# Patient Record
Sex: FEMALE | Race: BLACK OR AFRICAN AMERICAN | NOT HISPANIC OR LATINO | URBAN - METROPOLITAN AREA
[De-identification: names, ages, dates, MRNs, and addresses within clinical notes are randomized per-mention and may not be internally consistent; named-entity substitution may affect disease eponyms.]

---

## 2017-10-09 ENCOUNTER — INPATIENT (INPATIENT)
Facility: HOSPITAL | Age: 82
LOS: 2 days | Discharge: HOME | End: 2017-10-12
Attending: INTERNAL MEDICINE | Admitting: INTERNAL MEDICINE
Payer: MEDICARE

## 2017-10-09 VITALS
DIASTOLIC BLOOD PRESSURE: 94 MMHG | HEART RATE: 99 BPM | RESPIRATION RATE: 18 BRPM | WEIGHT: 171.08 LBS | TEMPERATURE: 98 F | SYSTOLIC BLOOD PRESSURE: 151 MMHG | OXYGEN SATURATION: 100 % | HEIGHT: 66 IN

## 2017-10-09 DIAGNOSIS — I10 ESSENTIAL (PRIMARY) HYPERTENSION: ICD-10-CM

## 2017-10-09 DIAGNOSIS — I82.431 ACUTE EMBOLISM AND THROMBOSIS OF RIGHT POPLITEAL VEIN: ICD-10-CM

## 2017-10-09 LAB
ALBUMIN SERPL ELPH-MCNC: 3.4 G/DL — SIGNIFICANT CHANGE UP (ref 3.3–5)
ALP SERPL-CCNC: 69 U/L — SIGNIFICANT CHANGE UP (ref 40–120)
ALT FLD-CCNC: 16 U/L — SIGNIFICANT CHANGE UP (ref 12–78)
ANION GAP SERPL CALC-SCNC: 8 MMOL/L — SIGNIFICANT CHANGE UP (ref 5–17)
APTT BLD: 28.4 SEC — SIGNIFICANT CHANGE UP (ref 27.5–37.4)
AST SERPL-CCNC: 21 U/L — SIGNIFICANT CHANGE UP (ref 15–37)
BASOPHILS # BLD AUTO: 0.1 K/UL — SIGNIFICANT CHANGE UP (ref 0–0.2)
BASOPHILS NFR BLD AUTO: 2.2 % — HIGH (ref 0–2)
BILIRUB SERPL-MCNC: 0.3 MG/DL — SIGNIFICANT CHANGE UP (ref 0.2–1.2)
BUN SERPL-MCNC: 12 MG/DL — SIGNIFICANT CHANGE UP (ref 7–23)
CALCIUM SERPL-MCNC: 9.3 MG/DL — SIGNIFICANT CHANGE UP (ref 8.5–10.1)
CHLORIDE SERPL-SCNC: 108 MMOL/L — SIGNIFICANT CHANGE UP (ref 96–108)
CO2 SERPL-SCNC: 25 MMOL/L — SIGNIFICANT CHANGE UP (ref 22–31)
CREAT SERPL-MCNC: 0.91 MG/DL — SIGNIFICANT CHANGE UP (ref 0.5–1.3)
D DIMER BLD IA.RAPID-MCNC: 793 NG/ML DDU — HIGH
EOSINOPHIL # BLD AUTO: 0.1 K/UL — SIGNIFICANT CHANGE UP (ref 0–0.5)
EOSINOPHIL NFR BLD AUTO: 1.6 % — SIGNIFICANT CHANGE UP (ref 0–6)
GLUCOSE SERPL-MCNC: 70 MG/DL — SIGNIFICANT CHANGE UP (ref 70–99)
HCT VFR BLD CALC: 39.3 % — SIGNIFICANT CHANGE UP (ref 34.5–45)
HGB BLD-MCNC: 12.6 G/DL — SIGNIFICANT CHANGE UP (ref 11.5–15.5)
INR BLD: 1.07 RATIO — SIGNIFICANT CHANGE UP (ref 0.88–1.16)
LYMPHOCYTES # BLD AUTO: 2.5 K/UL — SIGNIFICANT CHANGE UP (ref 1–3.3)
LYMPHOCYTES # BLD AUTO: 39 % — SIGNIFICANT CHANGE UP (ref 13–44)
MCHC RBC-ENTMCNC: 27.9 PG — SIGNIFICANT CHANGE UP (ref 27–34)
MCHC RBC-ENTMCNC: 32.1 GM/DL — SIGNIFICANT CHANGE UP (ref 32–36)
MCV RBC AUTO: 87 FL — SIGNIFICANT CHANGE UP (ref 80–100)
MONOCYTES # BLD AUTO: 0.7 K/UL — SIGNIFICANT CHANGE UP (ref 0–0.9)
MONOCYTES NFR BLD AUTO: 10.5 % — SIGNIFICANT CHANGE UP (ref 2–14)
NEUTROPHILS # BLD AUTO: 2.9 K/UL — SIGNIFICANT CHANGE UP (ref 1.8–7.4)
NEUTROPHILS NFR BLD AUTO: 46.7 % — SIGNIFICANT CHANGE UP (ref 43–77)
PLATELET # BLD AUTO: 211 K/UL — SIGNIFICANT CHANGE UP (ref 150–400)
POTASSIUM SERPL-MCNC: 4.3 MMOL/L — SIGNIFICANT CHANGE UP (ref 3.5–5.3)
POTASSIUM SERPL-SCNC: 4.3 MMOL/L — SIGNIFICANT CHANGE UP (ref 3.5–5.3)
PROT SERPL-MCNC: 8.3 GM/DL — SIGNIFICANT CHANGE UP (ref 6–8.3)
PROTHROM AB SERPL-ACNC: 11.7 SEC — SIGNIFICANT CHANGE UP (ref 9.8–12.7)
RBC # BLD: 4.52 M/UL — SIGNIFICANT CHANGE UP (ref 3.8–5.2)
RBC # FLD: 14.2 % — SIGNIFICANT CHANGE UP (ref 11–15)
SODIUM SERPL-SCNC: 141 MMOL/L — SIGNIFICANT CHANGE UP (ref 135–145)
WBC # BLD: 6.3 K/UL — SIGNIFICANT CHANGE UP (ref 3.8–10.5)
WBC # FLD AUTO: 6.3 K/UL — SIGNIFICANT CHANGE UP (ref 3.8–10.5)

## 2017-10-09 PROCEDURE — 93971 EXTREMITY STUDY: CPT | Mod: 26,RT

## 2017-10-09 PROCEDURE — 99285 EMERGENCY DEPT VISIT HI MDM: CPT

## 2017-10-09 RX ORDER — HEPARIN SODIUM 5000 [USP'U]/ML
3000 INJECTION INTRAVENOUS; SUBCUTANEOUS EVERY 6 HOURS
Qty: 0 | Refills: 0 | Status: DISCONTINUED | OUTPATIENT
Start: 2017-10-09 | End: 2017-10-10

## 2017-10-09 RX ORDER — HEPARIN SODIUM 5000 [USP'U]/ML
INJECTION INTRAVENOUS; SUBCUTANEOUS
Qty: 25000 | Refills: 0 | Status: DISCONTINUED | OUTPATIENT
Start: 2017-10-09 | End: 2017-10-10

## 2017-10-09 RX ORDER — HEPARIN SODIUM 5000 [USP'U]/ML
6500 INJECTION INTRAVENOUS; SUBCUTANEOUS EVERY 6 HOURS
Qty: 0 | Refills: 0 | Status: DISCONTINUED | OUTPATIENT
Start: 2017-10-09 | End: 2017-10-10

## 2017-10-09 RX ORDER — DULOXETINE HYDROCHLORIDE 30 MG/1
30 CAPSULE, DELAYED RELEASE ORAL DAILY
Qty: 0 | Refills: 0 | Status: DISCONTINUED | OUTPATIENT
Start: 2017-10-09 | End: 2017-10-12

## 2017-10-09 RX ORDER — HEPARIN SODIUM 5000 [USP'U]/ML
6500 INJECTION INTRAVENOUS; SUBCUTANEOUS ONCE
Qty: 0 | Refills: 0 | Status: COMPLETED | OUTPATIENT
Start: 2017-10-09 | End: 2017-10-09

## 2017-10-09 RX ADMIN — HEPARIN SODIUM 1400 UNIT(S)/HR: 5000 INJECTION INTRAVENOUS; SUBCUTANEOUS at 20:03

## 2017-10-09 RX ADMIN — HEPARIN SODIUM 6500 UNIT(S): 5000 INJECTION INTRAVENOUS; SUBCUTANEOUS at 20:02

## 2017-10-09 NOTE — ED ADULT NURSE NOTE - CAS EDN INTEG ASSESS
Left message for patient to return our phone call  
Phone call received from client stating she will be flying for [place] on Friday 1-27-17. States she gets motion sickness and wondering if she can take dramamine or is there something else she could take?    Last MD visit 1-11-17 for OB check    Call back to advise  
Pt notified of results. Verbalized understanding    
She can take dramamine.   
WDL

## 2017-10-09 NOTE — H&P ADULT - HISTORY OF PRESENT ILLNESS
The patient is a 85y Female complaining of pain, lower leg noted to have                                                                                                                 The patient is a 85y Female complaining of pain, lower leg noted to have DVT The patient is a 85y Female complaining of pain, lower leg noted to have DVT.                                                                                                                The patient is a 85y Female complaining of pain, lower leg noted to have DVT

## 2017-10-09 NOTE — ED ADULT TRIAGE NOTE - CHIEF COMPLAINT QUOTE
Swelling, warmth and pain to right calf since Friday, unable to get authorization from insurance company for out patient sonogram and pain is becoming unbearable with difficulty walking. Patient denies chest pains or sob.

## 2017-10-09 NOTE — ED PROVIDER NOTE - OBJECTIVE STATEMENT
85 year old femal 85 year old female with h/o HTN, osteoporosis, neuropathy and fibromyalgia presents today c/o right leg swelling and tenderness since Wednesday, pt is s/p right hip replacement on June 6, 2017 (-) chest pain (-) sob (-) palpitations (-) dizzy or lightheaded +tenderness with weight bearing rated 8/10

## 2017-10-10 LAB
ANION GAP SERPL CALC-SCNC: 9 MMOL/L — SIGNIFICANT CHANGE UP (ref 5–17)
APTT BLD: 106.2 SEC — HIGH (ref 27.5–37.4)
APTT BLD: 120.9 SEC — CRITICAL HIGH (ref 27.5–37.4)
APTT BLD: 43.1 SEC — HIGH (ref 27.5–37.4)
BUN SERPL-MCNC: 11 MG/DL — SIGNIFICANT CHANGE UP (ref 7–23)
CALCIUM SERPL-MCNC: 9.3 MG/DL — SIGNIFICANT CHANGE UP (ref 8.5–10.1)
CHLORIDE SERPL-SCNC: 106 MMOL/L — SIGNIFICANT CHANGE UP (ref 96–108)
CO2 SERPL-SCNC: 27 MMOL/L — SIGNIFICANT CHANGE UP (ref 22–31)
CREAT SERPL-MCNC: 0.87 MG/DL — SIGNIFICANT CHANGE UP (ref 0.5–1.3)
GLUCOSE SERPL-MCNC: 79 MG/DL — SIGNIFICANT CHANGE UP (ref 70–99)
HCT VFR BLD CALC: 38.6 % — SIGNIFICANT CHANGE UP (ref 34.5–45)
HCT VFR BLD CALC: 38.9 % — SIGNIFICANT CHANGE UP (ref 34.5–45)
HCT VFR BLD CALC: 39.7 % — SIGNIFICANT CHANGE UP (ref 34.5–45)
HGB BLD-MCNC: 12.4 G/DL — SIGNIFICANT CHANGE UP (ref 11.5–15.5)
HGB BLD-MCNC: 12.6 G/DL — SIGNIFICANT CHANGE UP (ref 11.5–15.5)
HGB BLD-MCNC: 12.9 G/DL — SIGNIFICANT CHANGE UP (ref 11.5–15.5)
MCHC RBC-ENTMCNC: 27.9 PG — SIGNIFICANT CHANGE UP (ref 27–34)
MCHC RBC-ENTMCNC: 28 PG — SIGNIFICANT CHANGE UP (ref 27–34)
MCHC RBC-ENTMCNC: 28.2 PG — SIGNIFICANT CHANGE UP (ref 27–34)
MCHC RBC-ENTMCNC: 32.2 GM/DL — SIGNIFICANT CHANGE UP (ref 32–36)
MCHC RBC-ENTMCNC: 32.4 GM/DL — SIGNIFICANT CHANGE UP (ref 32–36)
MCHC RBC-ENTMCNC: 32.5 GM/DL — SIGNIFICANT CHANGE UP (ref 32–36)
MCV RBC AUTO: 86.1 FL — SIGNIFICANT CHANGE UP (ref 80–100)
MCV RBC AUTO: 86.8 FL — SIGNIFICANT CHANGE UP (ref 80–100)
MCV RBC AUTO: 87 FL — SIGNIFICANT CHANGE UP (ref 80–100)
PLATELET # BLD AUTO: 256 K/UL — SIGNIFICANT CHANGE UP (ref 150–400)
PLATELET # BLD AUTO: 262 K/UL — SIGNIFICANT CHANGE UP (ref 150–400)
PLATELET # BLD AUTO: 264 K/UL — SIGNIFICANT CHANGE UP (ref 150–400)
POTASSIUM SERPL-MCNC: 3.8 MMOL/L — SIGNIFICANT CHANGE UP (ref 3.5–5.3)
POTASSIUM SERPL-SCNC: 3.8 MMOL/L — SIGNIFICANT CHANGE UP (ref 3.5–5.3)
RBC # BLD: 4.45 M/UL — SIGNIFICANT CHANGE UP (ref 3.8–5.2)
RBC # BLD: 4.51 M/UL — SIGNIFICANT CHANGE UP (ref 3.8–5.2)
RBC # BLD: 4.57 M/UL — SIGNIFICANT CHANGE UP (ref 3.8–5.2)
RBC # FLD: 13.6 % — SIGNIFICANT CHANGE UP (ref 11–15)
RBC # FLD: 13.7 % — SIGNIFICANT CHANGE UP (ref 11–15)
RBC # FLD: 14 % — SIGNIFICANT CHANGE UP (ref 11–15)
SODIUM SERPL-SCNC: 142 MMOL/L — SIGNIFICANT CHANGE UP (ref 135–145)
WBC # BLD: 4.8 K/UL — SIGNIFICANT CHANGE UP (ref 3.8–10.5)
WBC # BLD: 4.9 K/UL — SIGNIFICANT CHANGE UP (ref 3.8–10.5)
WBC # BLD: 5.7 K/UL — SIGNIFICANT CHANGE UP (ref 3.8–10.5)
WBC # FLD AUTO: 4.8 K/UL — SIGNIFICANT CHANGE UP (ref 3.8–10.5)
WBC # FLD AUTO: 4.9 K/UL — SIGNIFICANT CHANGE UP (ref 3.8–10.5)
WBC # FLD AUTO: 5.7 K/UL — SIGNIFICANT CHANGE UP (ref 3.8–10.5)

## 2017-10-10 PROCEDURE — 93010 ELECTROCARDIOGRAM REPORT: CPT

## 2017-10-10 PROCEDURE — 78582 LUNG VENTILAT&PERFUS IMAGING: CPT | Mod: 26

## 2017-10-10 PROCEDURE — 71010: CPT | Mod: 26

## 2017-10-10 RX ORDER — WARFARIN SODIUM 2.5 MG/1
7.5 TABLET ORAL ONCE
Qty: 0 | Refills: 0 | Status: COMPLETED | OUTPATIENT
Start: 2017-10-10 | End: 2017-10-10

## 2017-10-10 RX ORDER — INFLUENZA VIRUS VACCINE 15; 15; 15; 15 UG/.5ML; UG/.5ML; UG/.5ML; UG/.5ML
0.5 SUSPENSION INTRAMUSCULAR ONCE
Qty: 0 | Refills: 0 | Status: COMPLETED | OUTPATIENT
Start: 2017-10-10 | End: 2017-10-12

## 2017-10-10 RX ORDER — ACETAMINOPHEN 500 MG
650 TABLET ORAL EVERY 6 HOURS
Qty: 0 | Refills: 0 | Status: DISCONTINUED | OUTPATIENT
Start: 2017-10-10 | End: 2017-10-12

## 2017-10-10 RX ORDER — HEPARIN SODIUM 5000 [USP'U]/ML
6500 INJECTION INTRAVENOUS; SUBCUTANEOUS EVERY 6 HOURS
Qty: 0 | Refills: 0 | Status: DISCONTINUED | OUTPATIENT
Start: 2017-10-10 | End: 2017-10-11

## 2017-10-10 RX ORDER — HEPARIN SODIUM 5000 [USP'U]/ML
3000 INJECTION INTRAVENOUS; SUBCUTANEOUS EVERY 6 HOURS
Qty: 0 | Refills: 0 | Status: DISCONTINUED | OUTPATIENT
Start: 2017-10-10 | End: 2017-10-11

## 2017-10-10 RX ORDER — HEPARIN SODIUM 5000 [USP'U]/ML
1000 INJECTION INTRAVENOUS; SUBCUTANEOUS
Qty: 25000 | Refills: 0 | Status: DISCONTINUED | OUTPATIENT
Start: 2017-10-10 | End: 2017-10-11

## 2017-10-10 RX ADMIN — Medication 10 MILLIGRAM(S): at 05:54

## 2017-10-10 RX ADMIN — Medication 1 TABLET(S): at 05:56

## 2017-10-10 RX ADMIN — Medication 650 MILLIGRAM(S): at 22:30

## 2017-10-10 RX ADMIN — WARFARIN SODIUM 7.5 MILLIGRAM(S): 2.5 TABLET ORAL at 22:02

## 2017-10-10 RX ADMIN — HEPARIN SODIUM 1200 UNIT(S)/HR: 5000 INJECTION INTRAVENOUS; SUBCUTANEOUS at 03:30

## 2017-10-10 RX ADMIN — DULOXETINE HYDROCHLORIDE 30 MILLIGRAM(S): 30 CAPSULE, DELAYED RELEASE ORAL at 13:02

## 2017-10-10 RX ADMIN — HEPARIN SODIUM 3000 UNIT(S): 5000 INJECTION INTRAVENOUS; SUBCUTANEOUS at 20:31

## 2017-10-10 RX ADMIN — HEPARIN SODIUM 1000 UNIT(S)/HR: 5000 INJECTION INTRAVENOUS; SUBCUTANEOUS at 10:55

## 2017-10-10 RX ADMIN — HEPARIN SODIUM 1200 UNIT(S)/HR: 5000 INJECTION INTRAVENOUS; SUBCUTANEOUS at 20:28

## 2017-10-10 RX ADMIN — Medication 1 TABLET(S): at 00:07

## 2017-10-10 RX ADMIN — Medication 1 TABLET(S): at 13:02

## 2017-10-10 RX ADMIN — Medication 650 MILLIGRAM(S): at 23:30

## 2017-10-10 NOTE — PHYSICAL THERAPY INITIAL EVALUATION ADULT - CRITERIA FOR SKILLED THERAPEUTIC INTERVENTIONS
rehab potential/predicted duration of therapy intervention/therapy frequency/anticipated discharge recommendation/impairments found/functional limitations in following categories/Subacute Rehab/risk reduction/prevention

## 2017-10-10 NOTE — PHYSICAL THERAPY INITIAL EVALUATION ADULT - ADDITIONAL COMMENTS
Patient lives in a private house with 10 steps to get to her bedroom with R handrail. Patient lives alone however has friends who come by the house every other day or if called.

## 2017-10-10 NOTE — PROGRESS NOTE ADULT - SUBJECTIVE AND OBJECTIVE BOX
Patient is a 85y old  Female who presents with a chief complaint of weakness left leg, slurred speech (10 Oct 2017 00:49)      INTERVAL HPI/OVERNIGHT EVENTS:  pt with no complaint  MEDICATIONS  (STANDING):  calcium carbonate 1250 mG + Vitamin D (OsCal 500 + D) 1 Tablet(s) Oral three times a day  DULoxetine 30 milliGRAM(s) Oral daily  enalapril 10 milliGRAM(s) Oral daily  heparin  Infusion. 1000 Unit(s)/Hr (10 mL/Hr) IV Continuous <Continuous>  influenza   Vaccine 0.5 milliLiter(s) IntraMuscular once  warfarin 7.5 milliGRAM(s) Oral once    MEDICATIONS  (PRN):  heparin  Injectable 6500 Unit(s) IV Push every 6 hours PRN For aPTT less than 40  heparin  Injectable 3000 Unit(s) IV Push every 6 hours PRN For aPTT between 40 - 57      Allergies    aspirin (Unknown)    Intolerances        REVIEW OF SYSTEMS:  CONSTITUTIONAL: No fever, weight loss, or fatigue  EYES: No eye pain, visual disturbances, or discharge  ENMT:  No difficulty hearing, tinnitus, vertigo; No sinus or throat pain  NECK: No pain or stiffness  BREASTS: No pain, masses, or nipple discharge  RESPIRATORY: No cough, wheezing, chills or hemoptysis; No shortness of breath  CARDIOVASCULAR: No chest pain, palpitations, dizziness, or leg swelling  GASTROINTESTINAL: No abdominal or epigastric pain. No nausea, vomiting, or hematemesis; No diarrhea or constipation. No melena or hematochezia.  GENITOURINARY: No dysuria, frequency, hematuria, or incontinence  NEUROLOGICAL: No headaches, memory loss, loss of strength, numbness, or tremors  SKIN: No itching, burning, rashes, or lesions   LYMPH NODES: No enlarged glands  ENDOCRINE: No heat or cold intolerance; No hair loss  MUSCULOSKELETAL: No joint pain or swelling; No muscle, back, or extremity pain  PSYCHIATRIC: No depression, anxiety, mood swings, or difficulty sleeping  HEME/LYMPH: No easy bruising, or bleeding gums  ALLERGY AND IMMUNOLOGIC: No hives or eczema    Vital Signs Last 24 Hrs  T(C): 37.1 (10 Oct 2017 18:50), Max: 37.1 (09 Oct 2017 23:43)  T(F): 98.7 (10 Oct 2017 18:50), Max: 98.8 (09 Oct 2017 23:43)  HR: 73 (10 Oct 2017 18:50) (70 - 94)  BP: 156/85 (10 Oct 2017 18:50) (124/91 - 164/90)  BP(mean): --  RR: 16 (10 Oct 2017 18:50) (15 - 18)  SpO2: 96% (10 Oct 2017 18:50) (96% - 98%)    PHYSICAL EXAM:  GENERAL: NAD, well-groomed, well-developed  HEAD:  Atraumatic, Normocephalic  EYES: EOMI, PERRLA, conjunctiva and sclera clear  ENMT: No tonsillar erythema, exudates, or enlargement; Moist mucous membranes, Good dentition, No lesions  NECK: Supple, No JVD, Normal thyroid  NERVOUS SYSTEM:  Alert & Oriented X3, Good concentration; Motor Strength 5/5 B/L upper and lower extremities; DTRs 2+ intact and symmetric  CHEST/LUNG: Clear to percussion bilaterally; No rales, rhonchi, wheezing, or rubs  HEART: Regular rate and rhythm; No murmurs, rubs, or gallops  ABDOMEN: Soft, Nontender, Nondistended; Bowel sounds present  EXTREMITIES:  2+ Peripheral Pulses, No clubbing, cyanosis, or edema  LYMPH: No lymphadenopathy noted  SKIN: No rashes or lesions    LABS:                        12.4   4.8   )-----------( 262      ( 10 Oct 2017 19:55 )             38.6     10-10    142  |  106  |  11  ----------------------------<  79  3.8   |  27  |  0.87    Ca    9.3      10 Oct 2017 07:53    TPro  8.3  /  Alb  3.4  /  TBili  0.3  /  DBili  x   /  AST  21  /  ALT  16  /  AlkPhos  69  10-09    PT/INR - ( 09 Oct 2017 18:55 )   PT: 11.7 sec;   INR: 1.07 ratio         PTT - ( 10 Oct 2017 19:55 )  PTT:43.1 sec    CAPILLARY BLOOD GLUCOSE        CULTURES:    HEMOGLOBIN A1C:    CHOLESTEROL:        RADIOLOGY & ADDITIONAL TESTS:

## 2017-10-10 NOTE — PHYSICAL THERAPY INITIAL EVALUATION ADULT - MODIFIED CLINICAL TEST OF SENSORY INTEGRATION IN BALANCE TEST
Barthel Index: Feeding Score _10__, Bathing Score _0__, Grooming Score _0__, Dressing Score _5__, Bowels Score _10__, Bladder Score _10__, Toilet Score __5_, Transfers Score _10__, Mobility Score __0_, Stairs Score _0__,     Total Score _50__

## 2017-10-11 DIAGNOSIS — R51 HEADACHE: ICD-10-CM

## 2017-10-11 DIAGNOSIS — R11.10 VOMITING, UNSPECIFIED: ICD-10-CM

## 2017-10-11 LAB
APTT BLD: 153.2 SEC — CRITICAL HIGH (ref 27.5–37.4)
APTT BLD: 58 SEC — HIGH (ref 27.5–37.4)
CK MB BLD-MCNC: <0.8 % — SIGNIFICANT CHANGE UP (ref 0–3.5)
CK MB CFR SERPL CALC: <0.5 NG/ML — SIGNIFICANT CHANGE UP (ref 0.5–3.6)
CK SERPL-CCNC: 60 U/L — SIGNIFICANT CHANGE UP (ref 26–192)
HCT VFR BLD CALC: 36.4 % — SIGNIFICANT CHANGE UP (ref 34.5–45)
HGB BLD-MCNC: 12.3 G/DL — SIGNIFICANT CHANGE UP (ref 11.5–15.5)
INR BLD: 1.12 RATIO — SIGNIFICANT CHANGE UP (ref 0.88–1.16)
MCHC RBC-ENTMCNC: 28.8 PG — SIGNIFICANT CHANGE UP (ref 27–34)
MCHC RBC-ENTMCNC: 33.8 GM/DL — SIGNIFICANT CHANGE UP (ref 32–36)
MCV RBC AUTO: 85.2 FL — SIGNIFICANT CHANGE UP (ref 80–100)
PLATELET # BLD AUTO: 271 K/UL — SIGNIFICANT CHANGE UP (ref 150–400)
PROTHROM AB SERPL-ACNC: 12.2 SEC — SIGNIFICANT CHANGE UP (ref 9.8–12.7)
RBC # BLD: 4.27 M/UL — SIGNIFICANT CHANGE UP (ref 3.8–5.2)
RBC # FLD: 14 % — SIGNIFICANT CHANGE UP (ref 11–15)
TROPONIN I SERPL-MCNC: <.015 NG/ML — SIGNIFICANT CHANGE UP (ref 0.01–0.04)
TROPONIN I SERPL-MCNC: <.015 NG/ML — SIGNIFICANT CHANGE UP (ref 0.01–0.04)
WBC # BLD: 4.1 K/UL — SIGNIFICANT CHANGE UP (ref 3.8–10.5)
WBC # FLD AUTO: 4.1 K/UL — SIGNIFICANT CHANGE UP (ref 3.8–10.5)

## 2017-10-11 PROCEDURE — 70450 CT HEAD/BRAIN W/O DYE: CPT | Mod: 26

## 2017-10-11 PROCEDURE — 70551 MRI BRAIN STEM W/O DYE: CPT | Mod: 26

## 2017-10-11 PROCEDURE — 93010 ELECTROCARDIOGRAM REPORT: CPT

## 2017-10-11 RX ORDER — PANTOPRAZOLE SODIUM 20 MG/1
40 TABLET, DELAYED RELEASE ORAL ONCE
Qty: 0 | Refills: 0 | Status: COMPLETED | OUTPATIENT
Start: 2017-10-11 | End: 2017-10-11

## 2017-10-11 RX ORDER — ONDANSETRON 8 MG/1
4 TABLET, FILM COATED ORAL ONCE
Qty: 0 | Refills: 0 | Status: COMPLETED | OUTPATIENT
Start: 2017-10-11 | End: 2017-10-11

## 2017-10-11 RX ORDER — SENNA PLUS 8.6 MG/1
2 TABLET ORAL AT BEDTIME
Qty: 0 | Refills: 0 | Status: DISCONTINUED | OUTPATIENT
Start: 2017-10-11 | End: 2017-10-12

## 2017-10-11 RX ORDER — PANTOPRAZOLE SODIUM 20 MG/1
40 TABLET, DELAYED RELEASE ORAL
Qty: 0 | Refills: 0 | Status: DISCONTINUED | OUTPATIENT
Start: 2017-10-12 | End: 2017-10-12

## 2017-10-11 RX ORDER — RIVAROXABAN 15 MG-20MG
15 KIT ORAL
Qty: 0 | Refills: 0 | Status: DISCONTINUED | OUTPATIENT
Start: 2017-10-11 | End: 2017-10-12

## 2017-10-11 RX ORDER — POLYETHYLENE GLYCOL 3350 17 G/17G
17 POWDER, FOR SOLUTION ORAL DAILY
Qty: 0 | Refills: 0 | Status: DISCONTINUED | OUTPATIENT
Start: 2017-10-11 | End: 2017-10-12

## 2017-10-11 RX ORDER — GABAPENTIN 400 MG/1
300 CAPSULE ORAL
Qty: 0 | Refills: 0 | Status: DISCONTINUED | OUTPATIENT
Start: 2017-10-11 | End: 2017-10-12

## 2017-10-11 RX ORDER — MORPHINE SULFATE 50 MG/1
2 CAPSULE, EXTENDED RELEASE ORAL ONCE
Qty: 0 | Refills: 0 | Status: DISCONTINUED | OUTPATIENT
Start: 2017-10-11 | End: 2017-10-11

## 2017-10-11 RX ORDER — OXYCODONE AND ACETAMINOPHEN 5; 325 MG/1; MG/1
1 TABLET ORAL EVERY 6 HOURS
Qty: 0 | Refills: 0 | Status: DISCONTINUED | OUTPATIENT
Start: 2017-10-11 | End: 2017-10-12

## 2017-10-11 RX ADMIN — DULOXETINE HYDROCHLORIDE 30 MILLIGRAM(S): 30 CAPSULE, DELAYED RELEASE ORAL at 14:08

## 2017-10-11 RX ADMIN — Medication 10 MILLIGRAM(S): at 05:55

## 2017-10-11 RX ADMIN — Medication 650 MILLIGRAM(S): at 07:43

## 2017-10-11 RX ADMIN — GABAPENTIN 300 MILLIGRAM(S): 400 CAPSULE ORAL at 21:08

## 2017-10-11 RX ADMIN — Medication 1 TABLET(S): at 05:55

## 2017-10-11 RX ADMIN — ONDANSETRON 4 MILLIGRAM(S): 8 TABLET, FILM COATED ORAL at 12:07

## 2017-10-11 RX ADMIN — Medication 650 MILLIGRAM(S): at 14:07

## 2017-10-11 RX ADMIN — Medication 1 TABLET(S): at 14:08

## 2017-10-11 RX ADMIN — Medication 650 MILLIGRAM(S): at 08:43

## 2017-10-11 RX ADMIN — HEPARIN SODIUM 900 UNIT(S)/HR: 5000 INJECTION INTRAVENOUS; SUBCUTANEOUS at 05:47

## 2017-10-11 RX ADMIN — Medication 650 MILLIGRAM(S): at 15:07

## 2017-10-11 RX ADMIN — SENNA PLUS 2 TABLET(S): 8.6 TABLET ORAL at 21:08

## 2017-10-11 RX ADMIN — RIVAROXABAN 15 MILLIGRAM(S): KIT at 21:08

## 2017-10-11 RX ADMIN — PANTOPRAZOLE SODIUM 40 MILLIGRAM(S): 20 TABLET, DELAYED RELEASE ORAL at 12:06

## 2017-10-11 RX ADMIN — OXYCODONE AND ACETAMINOPHEN 1 TABLET(S): 5; 325 TABLET ORAL at 19:48

## 2017-10-11 RX ADMIN — MORPHINE SULFATE 2 MILLIGRAM(S): 50 CAPSULE, EXTENDED RELEASE ORAL at 12:07

## 2017-10-11 RX ADMIN — Medication 1 TABLET(S): at 21:08

## 2017-10-11 RX ADMIN — MORPHINE SULFATE 2 MILLIGRAM(S): 50 CAPSULE, EXTENDED RELEASE ORAL at 12:35

## 2017-10-11 RX ADMIN — POLYETHYLENE GLYCOL 3350 17 GRAM(S): 17 POWDER, FOR SOLUTION ORAL at 14:12

## 2017-10-11 RX ADMIN — OXYCODONE AND ACETAMINOPHEN 1 TABLET(S): 5; 325 TABLET ORAL at 21:00

## 2017-10-11 RX ADMIN — Medication 30 MILLILITER(S): at 12:48

## 2017-10-11 RX ADMIN — HEPARIN SODIUM 0 UNIT(S)/HR: 5000 INJECTION INTRAVENOUS; SUBCUTANEOUS at 04:28

## 2017-10-11 NOTE — PROGRESS NOTE ADULT - SUBJECTIVE AND OBJECTIVE BOX
Patient is a 85y old  Female who presents with a chief complaint of weakness left leg, slurred speech (10 Oct 2017 00:49)      INTERVAL HPI/OVERNIGHT EVENTS:  pt is vomiting this morning  MEDICATIONS  (STANDING):  calcium carbonate 1250 mG + Vitamin D (OsCal 500 + D) 1 Tablet(s) Oral three times a day  DULoxetine 30 milliGRAM(s) Oral daily  enalapril 10 milliGRAM(s) Oral daily  influenza   Vaccine 0.5 milliLiter(s) IntraMuscular once  polyethylene glycol 3350 17 Gram(s) Oral daily  senna 2 Tablet(s) Oral at bedtime    MEDICATIONS  (PRN):  acetaminophen   Tablet. 650 milliGRAM(s) Oral every 6 hours PRN Mild Pain (1 - 3)  aluminum hydroxide/magnesium hydroxide/simethicone Suspension 30 milliLiter(s) Oral every 6 hours PRN Dyspepsia      Allergies    aspirin (Unknown)    Intolerances        REVIEW OF SYSTEMS:  CONSTITUTIONAL: No fever, weight loss, or fatigue  EYES: No eye pain, visual disturbances, or discharge  ENMT:  No difficulty hearing, tinnitus, vertigo; No sinus or throat pain  NECK: No pain or stiffness  BREASTS: No pain, masses, or nipple discharge  RESPIRATORY: No cough, wheezing, chills or hemoptysis; No shortness of breath  CARDIOVASCULAR: No chest pain, palpitations, dizziness, or leg swelling  GASTROINTESTINAL: No abdominal or epigastric pain. No nausea, vomiting, or hematemesis; No diarrhea or constipation. No melena or hematochezia.  GENITOURINARY: No dysuria, frequency, hematuria, or incontinence  NEUROLOGICAL: No headaches, memory loss, loss of strength, numbness, or tremors  SKIN: No itching, burning, rashes, or lesions   LYMPH NODES: No enlarged glands  ENDOCRINE: No heat or cold intolerance; No hair loss  MUSCULOSKELETAL: No joint pain or swelling; No muscle, back, or extremity pain  PSYCHIATRIC: No depression, anxiety, mood swings, or difficulty sleeping  HEME/LYMPH: No easy bruising, or bleeding gums  ALLERGY AND IMMUNOLOGIC: No hives or eczema    Vital Signs Last 24 Hrs  T(C): 36.7 (11 Oct 2017 11:25), Max: 37.1 (10 Oct 2017 18:50)  T(F): 98 (11 Oct 2017 11:25), Max: 98.7 (10 Oct 2017 18:50)  HR: 91 (11 Oct 2017 11:40) (63 - 91)  BP: 150/87 (11 Oct 2017 11:40) (139/79 - 156/85)  BP(mean): --  RR: 16 (11 Oct 2017 11:25) (15 - 16)  SpO2: 97% (11 Oct 2017 11:25) (96% - 98%)    PHYSICAL EXAM:  GENERAL: NAD, well-groomed, well-developed  HEAD:  Atraumatic, Normocephalic  EYES: EOMI, PERRLA, conjunctiva and sclera clear  ENMT: No tonsillar erythema, exudates, or enlargement; Moist mucous membranes, Good dentition, No lesions  NECK: Supple, No JVD, Normal thyroid  NERVOUS SYSTEM:  Alert & Oriented X3, Good concentration; Motor Strength 5/5 B/L upper and lower extremities; DTRs 2+ intact and symmetric  CHEST/LUNG: Clear to percussion bilaterally; No rales, rhonchi, wheezing, or rubs  HEART: Regular rate and rhythm; No murmurs, rubs, or gallops  ABDOMEN: Soft, Nontender, Nondistended; Bowel sounds present  EXTREMITIES:  2+ Peripheral Pulses, No clubbing, cyanosis, or edema  LYMPH: No lymphadenopathy noted  SKIN: No rashes or lesions    LABS:                        12.3   4.1   )-----------( 271      ( 11 Oct 2017 08:03 )             36.4     10-10    142  |  106  |  11  ----------------------------<  79  3.8   |  27  |  0.87    Ca    9.3      10 Oct 2017 07:53    TPro  8.3  /  Alb  3.4  /  TBili  0.3  /  DBili  x   /  AST  21  /  ALT  16  /  AlkPhos  69  10-09    PT/INR - ( 11 Oct 2017 08:03 )   PT: 12.2 sec;   INR: 1.12 ratio         PTT - ( 11 Oct 2017 03:36 )  PTT:153.2 sec    CAPILLARY BLOOD GLUCOSE        CULTURES:    HEMOGLOBIN A1C:    CHOLESTEROL:        RADIOLOGY & ADDITIONAL TESTS:

## 2017-10-11 NOTE — CHART NOTE - NSCHARTNOTEFT_GEN_A_CORE
Hospitalist Medicine NP        CC: Requested by RN to evaluate patient.  On heparin drip c/o severe right sided headache 8/10 not relieved by Tylenol.                                                                                      HPI: The patient is a 85y Female complaining of pain, lower leg noted to have DVT (09 Oct 2017 18:39)    Vital Signs Last 24 Hrs  T(C): 36.7 (11 Oct 2017 11:25), Max: 37.1 (10 Oct 2017 18:50)  T(F): 98 (11 Oct 2017 11:25), Max: 98.7 (10 Oct 2017 18:50)  HR: 91 (11 Oct 2017 11:40) (63 - 91)  BP: 150/87 (11 Oct 2017 11:40) (139/79 - 156/85)  BP(mean): --  RR: 16 (11 Oct 2017 11:25) (15 - 16)  SpO2: 97% (11 Oct 2017 11:25) (96% - 98%)    REVIEW OF SYSTEMS:    RESPIRATORY: No cough, wheezing, chills or hemoptysis; No shortness of breath  CARDIOVASCULAR: midsternal pain denies radiation.   GASTROINTESTINAL: c/o nausea and vomited x 1. No abdominal or epigastric pain.   GENITOURINARY: No dysuria, frequency, hematuria, or incontinence  NEUROLOGICAL: No headaches, memory loss, loss of strength, numbness, or tremors      PHYSICAL EXAM:    GENERAL: NAD, well-groomed, well-developed  NERVOUS SYSTEM:  Alert & Oriented X3, Good concentration; Motor Strength 5/5 B/L upper and lower extremities; DTRs 2+ intact and symmetric  CHEST/LUNG: Clear to percussion bilaterally; No rales, rhonchi, wheezing, or rubs  HEART: Regular rate and rhythm; No murmurs, rubs, or gallops  ABDOMEN: Soft, Nontender, Nondistended; Bowel sounds present  EXTREMITIES:  2+ Peripheral Pulses, No clubbing, cyanosis, or edema    Assessment: Patient 85y with PMHx of HTN and fibromyalgia admitted with DEEP VEIN THROMBOSIS RIGHT LEG SWOLLEN    Plan:  - Stop heparin drip.   -STAT ekg and cardiac enzymes.  - follow up labs  - D/w Dr. Lala aware and agree with the plan  - will continue to follow up

## 2017-10-12 ENCOUNTER — TRANSCRIPTION ENCOUNTER (OUTPATIENT)
Age: 82
End: 2017-10-12

## 2017-10-12 VITALS
RESPIRATION RATE: 17 BRPM | OXYGEN SATURATION: 98 % | DIASTOLIC BLOOD PRESSURE: 71 MMHG | HEART RATE: 88 BPM | SYSTOLIC BLOOD PRESSURE: 117 MMHG

## 2017-10-12 LAB
HCT VFR BLD CALC: 38.9 % — SIGNIFICANT CHANGE UP (ref 34.5–45)
HGB BLD-MCNC: 12.4 G/DL — SIGNIFICANT CHANGE UP (ref 11.5–15.5)
INR BLD: 1.88 RATIO — HIGH (ref 0.88–1.16)
MCHC RBC-ENTMCNC: 28 PG — SIGNIFICANT CHANGE UP (ref 27–34)
MCHC RBC-ENTMCNC: 32 GM/DL — SIGNIFICANT CHANGE UP (ref 32–36)
MCV RBC AUTO: 87.6 FL — SIGNIFICANT CHANGE UP (ref 80–100)
PLATELET # BLD AUTO: 253 K/UL — SIGNIFICANT CHANGE UP (ref 150–400)
PROTHROM AB SERPL-ACNC: 20.8 SEC — HIGH (ref 9.8–12.7)
RBC # BLD: 4.44 M/UL — SIGNIFICANT CHANGE UP (ref 3.8–5.2)
RBC # FLD: 14 % — SIGNIFICANT CHANGE UP (ref 11–15)
TROPONIN I SERPL-MCNC: <.015 NG/ML — SIGNIFICANT CHANGE UP (ref 0.01–0.04)
WBC # BLD: 4 K/UL — SIGNIFICANT CHANGE UP (ref 3.8–10.5)
WBC # FLD AUTO: 4 K/UL — SIGNIFICANT CHANGE UP (ref 3.8–10.5)

## 2017-10-12 RX ORDER — RIVAROXABAN 15 MG-20MG
1 KIT ORAL
Qty: 42 | Refills: 0
Start: 2017-10-12 | End: 2017-11-02

## 2017-10-12 RX ORDER — PANTOPRAZOLE SODIUM 20 MG/1
1 TABLET, DELAYED RELEASE ORAL
Qty: 30 | Refills: 0
Start: 2017-10-12 | End: 2017-11-11

## 2017-10-12 RX ADMIN — GABAPENTIN 300 MILLIGRAM(S): 400 CAPSULE ORAL at 17:04

## 2017-10-12 RX ADMIN — Medication 1 TABLET(S): at 05:37

## 2017-10-12 RX ADMIN — RIVAROXABAN 15 MILLIGRAM(S): KIT at 05:37

## 2017-10-12 RX ADMIN — Medication 1 TABLET(S): at 13:55

## 2017-10-12 RX ADMIN — DULOXETINE HYDROCHLORIDE 30 MILLIGRAM(S): 30 CAPSULE, DELAYED RELEASE ORAL at 11:33

## 2017-10-12 RX ADMIN — Medication 10 MILLIGRAM(S): at 05:37

## 2017-10-12 RX ADMIN — PANTOPRAZOLE SODIUM 40 MILLIGRAM(S): 20 TABLET, DELAYED RELEASE ORAL at 08:42

## 2017-10-12 RX ADMIN — INFLUENZA VIRUS VACCINE 0.5 MILLILITER(S): 15; 15; 15; 15 SUSPENSION INTRAMUSCULAR at 15:24

## 2017-10-12 RX ADMIN — RIVAROXABAN 15 MILLIGRAM(S): KIT at 17:04

## 2017-10-12 RX ADMIN — GABAPENTIN 300 MILLIGRAM(S): 400 CAPSULE ORAL at 05:37

## 2017-10-12 NOTE — DISCHARGE NOTE ADULT - HOSPITAL COURSE
The patient is a 85y Female complaining of pain, lower leg noted to have DVT, pt had headache and vomiting, CT and MRI of the brain are normal

## 2017-10-12 NOTE — DISCHARGE NOTE ADULT - MEDICATION SUMMARY - MEDICATIONS TO TAKE
I will START or STAY ON the medications listed below when I get home from the hospital:    Vitamin D 2000MG ORAL  -- orally once a day  -- Indication: For Vitamin D    enalapril 10 mg oral tablet  -- 1 tab(s) by mouth once a day  -- Indication: For Essential hypertension    rivaroxaban 15 mg oral tablet  -- 1 tab(s) by mouth 2 times a day  -- Indication: For DEEP VEIN THROMBOSIS    DULoxetine 30 mg oral delayed release capsule  -- 1 cap(s) by mouth 2 times a day  -- Indication: For DEpresion    Colcrys 0.6 mg oral tablet  -- 1 tab(s) by mouth once a day  -- Indication: For gout    pantoprazole 40 mg oral delayed release tablet  -- 1 tab(s) by mouth once a day (before a meal)  -- Indication: For gastritis    Calcium 600+D 600 mg-200 intl units oral tablet  -- 1 tab(s) by mouth 3 times a day  -- Indication: For osteoporosis

## 2017-10-12 NOTE — DISCHARGE NOTE ADULT - NSTOBACCOHOTLINE_GEN_A_CS
[facility] Smokers Quitline ([phone]) [facility] Smokers Quitline ([phone])

## 2017-10-12 NOTE — DISCHARGE NOTE ADULT - SECONDARY DIAGNOSIS.
Essential hypertension Vomiting, intractability of vomiting not specified, presence of nausea not specified, unspecified vomiting type

## 2017-10-12 NOTE — DISCHARGE NOTE ADULT - CARE PLAN
Principal Discharge DX:	Acute deep vein thrombosis (DVT) of popliteal vein of right lower extremity  Goal:	cont xarelto  Instructions for follow-up, activity and diet:	follow up with pmd in 1 week  Secondary Diagnosis:	Essential hypertension  Secondary Diagnosis:	Vomiting, intractability of vomiting not specified, presence of nausea not specified, unspecified vomiting type  Secondary Diagnosis:	Essential hypertension

## 2017-10-12 NOTE — DISCHARGE NOTE ADULT - PATIENT PORTAL LINK FT
“You can access the Follow[employer] Patient Portal, offered by [facility], by registering with the following website: [email or web address]”

## 2017-10-16 ENCOUNTER — EMERGENCY (EMERGENCY)
Facility: HOSPITAL | Age: 82
LOS: 0 days | Discharge: HOME | End: 2017-10-16
Attending: EMERGENCY MEDICINE
Payer: MEDICARE

## 2017-10-16 VITALS
TEMPERATURE: 98 F | WEIGHT: 169.98 LBS | HEART RATE: 77 BPM | RESPIRATION RATE: 16 BRPM | DIASTOLIC BLOOD PRESSURE: 94 MMHG | OXYGEN SATURATION: 98 % | HEIGHT: 66 IN | SYSTOLIC BLOOD PRESSURE: 153 MMHG

## 2017-10-16 VITALS
SYSTOLIC BLOOD PRESSURE: 136 MMHG | RESPIRATION RATE: 19 BRPM | OXYGEN SATURATION: 98 % | DIASTOLIC BLOOD PRESSURE: 69 MMHG | HEART RATE: 97 BPM

## 2017-10-16 DIAGNOSIS — R42 DIZZINESS AND GIDDINESS: ICD-10-CM

## 2017-10-16 DIAGNOSIS — R51 HEADACHE: ICD-10-CM

## 2017-10-16 DIAGNOSIS — I10 ESSENTIAL (PRIMARY) HYPERTENSION: ICD-10-CM

## 2017-10-16 DIAGNOSIS — M19.90 UNSPECIFIED OSTEOARTHRITIS, UNSPECIFIED SITE: ICD-10-CM

## 2017-10-16 DIAGNOSIS — G62.9 POLYNEUROPATHY, UNSPECIFIED: ICD-10-CM

## 2017-10-16 DIAGNOSIS — I82.431 ACUTE EMBOLISM AND THROMBOSIS OF RIGHT POPLITEAL VEIN: ICD-10-CM

## 2017-10-16 DIAGNOSIS — Z88.6 ALLERGY STATUS TO ANALGESIC AGENT: ICD-10-CM

## 2017-10-16 DIAGNOSIS — M81.0 AGE-RELATED OSTEOPOROSIS WITHOUT CURRENT PATHOLOGICAL FRACTURE: ICD-10-CM

## 2017-10-16 DIAGNOSIS — R11.10 VOMITING, UNSPECIFIED: ICD-10-CM

## 2017-10-16 DIAGNOSIS — M79.7 FIBROMYALGIA: ICD-10-CM

## 2017-10-16 DIAGNOSIS — Z96.641 PRESENCE OF RIGHT ARTIFICIAL HIP JOINT: ICD-10-CM

## 2017-10-16 LAB
ALBUMIN SERPL ELPH-MCNC: 3.4 G/DL — SIGNIFICANT CHANGE UP (ref 3.3–5)
ALP SERPL-CCNC: 67 U/L — SIGNIFICANT CHANGE UP (ref 40–120)
ALT FLD-CCNC: 15 U/L — SIGNIFICANT CHANGE UP (ref 12–78)
ANION GAP SERPL CALC-SCNC: 8 MMOL/L — SIGNIFICANT CHANGE UP (ref 5–17)
APTT BLD: 37.4 SEC — SIGNIFICANT CHANGE UP (ref 27.5–37.4)
AST SERPL-CCNC: 13 U/L — LOW (ref 15–37)
BASOPHILS # BLD AUTO: 0.1 K/UL — SIGNIFICANT CHANGE UP (ref 0–0.2)
BASOPHILS NFR BLD AUTO: 1.9 % — SIGNIFICANT CHANGE UP (ref 0–2)
BILIRUB SERPL-MCNC: 0.3 MG/DL — SIGNIFICANT CHANGE UP (ref 0.2–1.2)
BUN SERPL-MCNC: 18 MG/DL — SIGNIFICANT CHANGE UP (ref 7–23)
CALCIUM SERPL-MCNC: 9.1 MG/DL — SIGNIFICANT CHANGE UP (ref 8.5–10.1)
CHLORIDE SERPL-SCNC: 107 MMOL/L — SIGNIFICANT CHANGE UP (ref 96–108)
CK MB BLD-MCNC: 1.3 % — SIGNIFICANT CHANGE UP (ref 0–3.5)
CK MB CFR SERPL CALC: 1.1 NG/ML — SIGNIFICANT CHANGE UP (ref 0.5–3.6)
CK SERPL-CCNC: 83 U/L — SIGNIFICANT CHANGE UP (ref 26–192)
CO2 SERPL-SCNC: 26 MMOL/L — SIGNIFICANT CHANGE UP (ref 22–31)
CREAT SERPL-MCNC: 0.93 MG/DL — SIGNIFICANT CHANGE UP (ref 0.5–1.3)
EOSINOPHIL # BLD AUTO: 0.1 K/UL — SIGNIFICANT CHANGE UP (ref 0–0.5)
EOSINOPHIL NFR BLD AUTO: 1.6 % — SIGNIFICANT CHANGE UP (ref 0–6)
ERYTHROCYTE [SEDIMENTATION RATE] IN BLOOD: 21 MM/HR — HIGH (ref 0–20)
GLUCOSE SERPL-MCNC: 67 MG/DL — LOW (ref 70–99)
HCT VFR BLD CALC: 36.4 % — SIGNIFICANT CHANGE UP (ref 34.5–45)
HGB BLD-MCNC: 12.1 G/DL — SIGNIFICANT CHANGE UP (ref 11.5–15.5)
INR BLD: 2.03 RATIO — HIGH (ref 0.88–1.16)
LYMPHOCYTES # BLD AUTO: 2.3 K/UL — SIGNIFICANT CHANGE UP (ref 1–3.3)
LYMPHOCYTES # BLD AUTO: 40.2 % — SIGNIFICANT CHANGE UP (ref 13–44)
MCHC RBC-ENTMCNC: 28.1 PG — SIGNIFICANT CHANGE UP (ref 27–34)
MCHC RBC-ENTMCNC: 33.2 GM/DL — SIGNIFICANT CHANGE UP (ref 32–36)
MCV RBC AUTO: 84.6 FL — SIGNIFICANT CHANGE UP (ref 80–100)
MONOCYTES # BLD AUTO: 0.5 K/UL — SIGNIFICANT CHANGE UP (ref 0–0.9)
MONOCYTES NFR BLD AUTO: 8.3 % — SIGNIFICANT CHANGE UP (ref 2–14)
NEUTROPHILS # BLD AUTO: 2.8 K/UL — SIGNIFICANT CHANGE UP (ref 1.8–7.4)
NEUTROPHILS NFR BLD AUTO: 48 % — SIGNIFICANT CHANGE UP (ref 43–77)
PLATELET # BLD AUTO: 285 K/UL — SIGNIFICANT CHANGE UP (ref 150–400)
POTASSIUM SERPL-MCNC: 4 MMOL/L — SIGNIFICANT CHANGE UP (ref 3.5–5.3)
POTASSIUM SERPL-SCNC: 4 MMOL/L — SIGNIFICANT CHANGE UP (ref 3.5–5.3)
PROT SERPL-MCNC: 8.2 GM/DL — SIGNIFICANT CHANGE UP (ref 6–8.3)
PROTHROM AB SERPL-ACNC: 22.4 SEC — HIGH (ref 9.8–12.7)
RBC # BLD: 4.31 M/UL — SIGNIFICANT CHANGE UP (ref 3.8–5.2)
RBC # FLD: 13.9 % — SIGNIFICANT CHANGE UP (ref 11–15)
SODIUM SERPL-SCNC: 141 MMOL/L — SIGNIFICANT CHANGE UP (ref 135–145)
TROPONIN I SERPL-MCNC: <.015 NG/ML — SIGNIFICANT CHANGE UP (ref 0.01–0.04)
WBC # BLD: 5.8 K/UL — SIGNIFICANT CHANGE UP (ref 3.8–10.5)
WBC # FLD AUTO: 5.8 K/UL — SIGNIFICANT CHANGE UP (ref 3.8–10.5)

## 2017-10-16 PROCEDURE — 99285 EMERGENCY DEPT VISIT HI MDM: CPT

## 2017-10-16 PROCEDURE — 70450 CT HEAD/BRAIN W/O DYE: CPT | Mod: 26

## 2017-10-16 PROCEDURE — 71010: CPT | Mod: 26

## 2017-10-16 RX ORDER — MORPHINE SULFATE 50 MG/1
2 CAPSULE, EXTENDED RELEASE ORAL ONCE
Qty: 0 | Refills: 0 | Status: DISCONTINUED | OUTPATIENT
Start: 2017-10-16 | End: 2017-10-16

## 2017-10-16 RX ORDER — METOCLOPRAMIDE HCL 10 MG
5 TABLET ORAL ONCE
Qty: 0 | Refills: 0 | Status: COMPLETED | OUTPATIENT
Start: 2017-10-16 | End: 2017-10-16

## 2017-10-16 RX ADMIN — MORPHINE SULFATE 2 MILLIGRAM(S): 50 CAPSULE, EXTENDED RELEASE ORAL at 18:50

## 2017-10-16 RX ADMIN — Medication 5 MILLIGRAM(S): at 16:49

## 2017-10-16 RX ADMIN — MORPHINE SULFATE 2 MILLIGRAM(S): 50 CAPSULE, EXTENDED RELEASE ORAL at 16:49

## 2017-10-16 NOTE — ED PROVIDER NOTE - OBJECTIVE STATEMENT
84 yo female presents with bilateral temproal headache since this morning. +vertigo, non at present. Patient denies chest pain, shortness of breath, fever, chills, abdominal pain, change in vision, syncope. Patient has not taken anything for pain. headache 7/10 at present. Patient discharged from this [facility] 4 days ago without acute findings on ct brain or MRI brain.

## 2017-10-16 NOTE — ED PROVIDER NOTE - MEDICAL DECISION MAKING DETAILS
Patient diagnosed with dvt few days ago, headache and vomiting presents with headache and vomiting Patient diagnosed with dvt few days ago, headache and vomiting presents with headache and vomiting; patient improved, patient to f/u with dr. Mao

## 2017-10-16 NOTE — ED ADULT NURSE REASSESSMENT NOTE - NS ED NURSE REASSESS COMMENT FT1
pt Received from [facility] with c/o dizziness , was seen in ER last week for same reason, ct scan done , awaiting to be seen by Neurologist,otherwise stable.

## 2017-10-16 NOTE — ED PROVIDER NOTE - CROS ED NEURO POS
Pt. has felt feverish today- they haven't been able to get a good read. They don't think thermometer is working. Her lips were blue for a couple minutes about 10 minutes ago. She seemed congested, but no wheezing or trouble breathing. The congestion seems to be in chest, and seemed better after she coughed. The congestion started today.  Her nose is a little congested too. She is acting normal. She napped a little longer today. She is drinking and wetting well. She didn't seem congested when her lips were blue. She had a fast heartbeat when she was a few weeks old, but no other cardiac or respiratory hx. Discussed with Dr. Rangel. Told Mom she could be seen at 5. Mom states they are in [place], which is where they live. Told Mom that Dr. Rangel had asked that they not have tylenol or ibuprofen as he wanted to see if she has a fever. Mom will either take her to an urgent care or ER in [place]. Encouraged her to have her seen soon.    dizzy/HEADACHE

## 2018-01-01 NOTE — ED ADULT NURSE NOTE - NS ED PATIENT SAFETY CONCERN
-routine  care  -obtain HC, weight, and length to assess for AGA, SGA, and LGA  -Breastfeed ad paul  -daily weights  -CCHD, audiology and  screen to be performed  -Circ per parental request No

## 2018-05-14 NOTE — H&P ADULT - NSHPREVIEWOFSYSTEMS_GEN_ALL_CORE
CONSTITUTIONAL: No fever, weight loss, or fatigue  EYES: No eye pain, visual disturbances, or discharge  ENMT:  No difficulty hearing, tinnitus, vertigo; No sinus or throat pain  NECK: No pain or stiffness  BREASTS: No pain, masses, or nipple discharge  RESPIRATORY: No cough, wheezing, chills or hemoptysis; No shortness of breath  CARDIOVASCULAR: No chest pain, palpitations, dizziness, or leg swelling  GASTROINTESTINAL: No abdominal or epigastric pain. No nausea, vomiting, or hematemesis; No diarrhea or constipation. No melena or hematochezia.  GENITOURINARY: No dysuria, frequency, hematuria, or incontinence  NEUROLOGICAL: No headaches, memory loss, loss of strength, numbness, or tremors  SKIN: No itching, burning, rashes, or lesions   LYMPH NODES: No enlarged glands  ENDOCRINE: No heat or cold intolerance; No hair loss  MUSCULOSKELETAL: No joint pain or swelling; No muscle, back, or extremity pain  PSYCHIATRIC: No depression, anxiety, mood swings, or difficulty sleeping  HEME/LYMPH: No easy bruising, or bleeding gums  ALLERGY AND IMMUNOLOGIC: No hives or eczema
160
Take your medications exactly as prescribed. Having your pain under control will help increase activity, improve deep breathing and coughing and prevent complications like pneumonia and blood clots in your legs. Some of the common side effects of pain medications are nausea, vomiting, itching, rash and upset stomach. Contact your doctor if you develop these or any other unusual systoms. Eat a diet rich in fiber and drink plenty of oral fluids. Use other pain management methods like, cold and warm applications, elevation of affected body part, listening to music, watching TV, yoga, etc.Do not take any other medications unless approved by your doctor. Do not drive, operate machinery, drink alcohol, or make any important decisions while taking narcotic pain medications. Store medication in its original bottle, in a locked cabinet away from the reach of children. Dispose of any unused and  medication safely./No

## 2018-08-23 ENCOUNTER — EMERGENCY (EMERGENCY)
Facility: HOSPITAL | Age: 83
LOS: 0 days | Discharge: ANOTHER HOSPITAL | End: 2018-08-24
Attending: EMERGENCY MEDICINE
Payer: COMMERCIAL

## 2018-08-23 VITALS
HEIGHT: 66 IN | TEMPERATURE: 98 F | HEART RATE: 96 BPM | OXYGEN SATURATION: 97 % | WEIGHT: 171.96 LBS | RESPIRATION RATE: 16 BRPM | DIASTOLIC BLOOD PRESSURE: 92 MMHG | SYSTOLIC BLOOD PRESSURE: 162 MMHG

## 2018-08-23 VITALS
OXYGEN SATURATION: 95 % | HEART RATE: 79 BPM | TEMPERATURE: 98 F | DIASTOLIC BLOOD PRESSURE: 76 MMHG | SYSTOLIC BLOOD PRESSURE: 148 MMHG | RESPIRATION RATE: 16 BRPM

## 2018-08-23 DIAGNOSIS — M79.604 PAIN IN RIGHT LEG: ICD-10-CM

## 2018-08-23 DIAGNOSIS — M79.89 OTHER SPECIFIED SOFT TISSUE DISORDERS: ICD-10-CM

## 2018-08-23 DIAGNOSIS — M79.661 PAIN IN RIGHT LOWER LEG: ICD-10-CM

## 2018-08-23 DIAGNOSIS — N39.0 URINARY TRACT INFECTION, SITE NOT SPECIFIED: ICD-10-CM

## 2018-08-23 DIAGNOSIS — I10 ESSENTIAL (PRIMARY) HYPERTENSION: ICD-10-CM

## 2018-08-23 DIAGNOSIS — Z88.8 ALLERGY STATUS TO OTHER DRUGS, MEDICAMENTS AND BIOLOGICAL SUBSTANCES: ICD-10-CM

## 2018-08-23 DIAGNOSIS — M79.1 MYALGIA: ICD-10-CM

## 2018-08-23 DIAGNOSIS — M10.9 GOUT, UNSPECIFIED: ICD-10-CM

## 2018-08-23 DIAGNOSIS — R10.9 UNSPECIFIED ABDOMINAL PAIN: ICD-10-CM

## 2018-08-23 LAB
ALBUMIN SERPL ELPH-MCNC: 3.4 G/DL — SIGNIFICANT CHANGE UP (ref 3.3–5)
ALP SERPL-CCNC: 60 U/L — SIGNIFICANT CHANGE UP (ref 40–120)
ALT FLD-CCNC: 15 U/L — SIGNIFICANT CHANGE UP (ref 12–78)
ANION GAP SERPL CALC-SCNC: 8 MMOL/L — SIGNIFICANT CHANGE UP (ref 5–17)
APPEARANCE UR: ABNORMAL
APTT BLD: 29 SEC — SIGNIFICANT CHANGE UP (ref 27.5–37.4)
AST SERPL-CCNC: 15 U/L — SIGNIFICANT CHANGE UP (ref 15–37)
BACTERIA # UR AUTO: ABNORMAL
BASOPHILS # BLD AUTO: 0.04 K/UL — SIGNIFICANT CHANGE UP (ref 0–0.2)
BASOPHILS NFR BLD AUTO: 0.6 % — SIGNIFICANT CHANGE UP (ref 0–2)
BILIRUB SERPL-MCNC: 0.3 MG/DL — SIGNIFICANT CHANGE UP (ref 0.2–1.2)
BILIRUB UR-MCNC: NEGATIVE — SIGNIFICANT CHANGE UP
BUN SERPL-MCNC: 12 MG/DL — SIGNIFICANT CHANGE UP (ref 7–23)
CALCIUM SERPL-MCNC: 8.5 MG/DL — SIGNIFICANT CHANGE UP (ref 8.5–10.1)
CHLORIDE SERPL-SCNC: 112 MMOL/L — HIGH (ref 96–108)
CO2 SERPL-SCNC: 27 MMOL/L — SIGNIFICANT CHANGE UP (ref 22–31)
COLOR SPEC: YELLOW — SIGNIFICANT CHANGE UP
CREAT SERPL-MCNC: 0.82 MG/DL — SIGNIFICANT CHANGE UP (ref 0.5–1.3)
DIFF PNL FLD: ABNORMAL
EOSINOPHIL # BLD AUTO: 0.18 K/UL — SIGNIFICANT CHANGE UP (ref 0–0.5)
EOSINOPHIL NFR BLD AUTO: 2.5 % — SIGNIFICANT CHANGE UP (ref 0–6)
EPI CELLS # UR: SIGNIFICANT CHANGE UP
GLUCOSE SERPL-MCNC: 83 MG/DL — SIGNIFICANT CHANGE UP (ref 70–99)
GLUCOSE UR QL: NEGATIVE MG/DL — SIGNIFICANT CHANGE UP
HCT VFR BLD CALC: 41.2 % — SIGNIFICANT CHANGE UP (ref 34.5–45)
HGB BLD-MCNC: 13.2 G/DL — SIGNIFICANT CHANGE UP (ref 11.5–15.5)
IMM GRANULOCYTES NFR BLD AUTO: 0.1 % — SIGNIFICANT CHANGE UP (ref 0–1.5)
INR BLD: 1.1 RATIO — SIGNIFICANT CHANGE UP (ref 0.88–1.16)
KETONES UR-MCNC: NEGATIVE — SIGNIFICANT CHANGE UP
LEUKOCYTE ESTERASE UR-ACNC: ABNORMAL
LYMPHOCYTES # BLD AUTO: 2.62 K/UL — SIGNIFICANT CHANGE UP (ref 1–3.3)
LYMPHOCYTES # BLD AUTO: 36.7 % — SIGNIFICANT CHANGE UP (ref 13–44)
MCHC RBC-ENTMCNC: 27.6 PG — SIGNIFICANT CHANGE UP (ref 27–34)
MCHC RBC-ENTMCNC: 32 GM/DL — SIGNIFICANT CHANGE UP (ref 32–36)
MCV RBC AUTO: 86.2 FL — SIGNIFICANT CHANGE UP (ref 80–100)
MONOCYTES # BLD AUTO: 0.71 K/UL — SIGNIFICANT CHANGE UP (ref 0–0.9)
MONOCYTES NFR BLD AUTO: 9.9 % — SIGNIFICANT CHANGE UP (ref 2–14)
NEUTROPHILS # BLD AUTO: 3.58 K/UL — SIGNIFICANT CHANGE UP (ref 1.8–7.4)
NEUTROPHILS NFR BLD AUTO: 50.2 % — SIGNIFICANT CHANGE UP (ref 43–77)
NITRITE UR-MCNC: NEGATIVE — SIGNIFICANT CHANGE UP
PH UR: 5 — SIGNIFICANT CHANGE UP (ref 5–8)
PLATELET # BLD AUTO: 292 K/UL — SIGNIFICANT CHANGE UP (ref 150–400)
POTASSIUM SERPL-MCNC: 3.9 MMOL/L — SIGNIFICANT CHANGE UP (ref 3.5–5.3)
POTASSIUM SERPL-SCNC: 3.9 MMOL/L — SIGNIFICANT CHANGE UP (ref 3.5–5.3)
PROT SERPL-MCNC: 7.4 GM/DL — SIGNIFICANT CHANGE UP (ref 6–8.3)
PROT UR-MCNC: 15 MG/DL
PROTHROM AB SERPL-ACNC: 12 SEC — SIGNIFICANT CHANGE UP (ref 9.8–12.7)
RBC # BLD: 4.78 M/UL — SIGNIFICANT CHANGE UP (ref 3.8–5.2)
RBC # FLD: 14.6 % — HIGH (ref 10.3–14.5)
SODIUM SERPL-SCNC: 147 MMOL/L — HIGH (ref 135–145)
SP GR SPEC: 1.02 — SIGNIFICANT CHANGE UP (ref 1.01–1.02)
UROBILINOGEN FLD QL: NEGATIVE MG/DL — SIGNIFICANT CHANGE UP
WBC # BLD: 7.14 K/UL — SIGNIFICANT CHANGE UP (ref 3.8–10.5)
WBC # FLD AUTO: 7.14 K/UL — SIGNIFICANT CHANGE UP (ref 3.8–10.5)
WBC UR QL: ABNORMAL

## 2018-08-23 PROCEDURE — 99284 EMERGENCY DEPT VISIT MOD MDM: CPT

## 2018-08-23 PROCEDURE — 93971 EXTREMITY STUDY: CPT | Mod: 26,RT

## 2018-08-23 RX ORDER — SODIUM CHLORIDE 9 MG/ML
1000 INJECTION INTRAMUSCULAR; INTRAVENOUS; SUBCUTANEOUS ONCE
Qty: 0 | Refills: 0 | Status: COMPLETED | OUTPATIENT
Start: 2018-08-23 | End: 2018-08-23

## 2018-08-23 RX ADMIN — SODIUM CHLORIDE 1000 MILLILITER(S): 9 INJECTION INTRAMUSCULAR; INTRAVENOUS; SUBCUTANEOUS at 20:19

## 2018-08-23 NOTE — ED PROVIDER NOTE - PHYSICAL EXAMINATION
Gen: Alert, NAD, not toxic looking  Head: NC, AT, PERRL, EOMI, normal lids/conjunctiva  ENT: B TM WNL, normal hearing, patent oropharynx without erythema/exudate, uvula midline  Neck: +supple, no tenderness/meningismus/JVD, +Trachea midline  Pulm: Bilateral BS, normal resp effort, no wheeze/stridor/retractions  CV: RRR, no M/R/G, +dist pulses  Abd: soft, NT/ND, +BS, no hepatosplenomegaly  Mskel: no erythema/cyanosis, + R calf tenderness, no erythema or pitting edema  Skin: no rash  Neuro: AAOx3, no sensory/motor deficits, CN 2-12 intact

## 2018-08-23 NOTE — ED PROVIDER NOTE - OBJECTIVE STATEMENT
87 y/o female with PMH b/l hip replacement, SBO, DVT (not currently on AC), fibromyalgia, gout, HTN here c/o R calf pain and swelling x 3 days. pt states she went to her pcp Dr James today who sent her to ed to r/o DVT. pt uses walker at baseline. pt also states she noticed blood on tissue every time she wipes and feels pain to her rectal area when she has a BM. no blood in stool. no fevers. no recent travel or surgeries. denies any trauma or injuries.    ROS: No fever/chills. No eye pain/changes in vision, No ear pain/sore throat/dysphagia, No chest pain/palpitations. No SOB/cough/. No abdominal pain, N/V/D, no black/bloody bm. No dysuria/frequency/discharge, No headache. No Dizziness.    No rashes or breaks in skin. No numbness/tingling/weakness. 85 y/o female with PMH b/l hip replacement, SBO, DVT (not currently on AC), fibromyalgia, gout, HTN here c/o R calf pain and swelling x 3 days. pt states she went to her pcp Dr James today who sent her to ed to r/o DVT. pt uses walker at baseline. pt also states she noticed blood on tissue every time she wipes and feels pain near her rectal area when she has a BM. no blood in stool. no fevers. no recent travel or surgeries. denies any trauma or injuries.    ROS: No fever/chills. No eye pain/changes in vision, No ear pain/sore throat/dysphagia, No chest pain/palpitations. No SOB/cough/.  +RLQ abdominal pain, N/V/D, no black/bloody bm. No dysuria/frequency/discharge, No headache. No Dizziness.    No rashes or breaks in skin. No numbness/tingling/weakness.

## 2018-08-23 NOTE — ED PROVIDER NOTE - ATTENDING CONTRIBUTION TO CARE
Patient seen with KELLY Newton.  Well appearing, sent from PMD Dr. Calvo to r/o right leg DVT, has been having right calf pain with mild swelling for the last couple of days, h/o prior DVT.  Also c/o RLQ pain x2 day without n/v/d, pain nonradiating.  LBM today and nonbloody.  Also c/o pain near rectum with small spots of blood when wiping.  Denies use of blood thinners.    Gen: Alert, NAD, well appearing  Head: NC, AT, PERRL, EOMI, normal lids/conjunctiva  ENT: normal hearing, patent oropharynx without erythema/exudate, uvula midline  Neck: +supple, no tenderness/meningismus/JVD, +Trachea midline  Pulm: Bilateral BS, normal resp effort, no wheeze/stridor/retractions  CV: RRR, no M/R/G, +dist pulses  Abd: soft, ND, +RLQ tenderness +BS, no palpable masses  Rectum: no hemorrhoids, no visible bleeding, no rectal pain, focus of tenderness is small pimple above the rectum in the midline gluteal cleft which looks open, no abscess  Mskel: no edema/erythema/cyanosis, no significant right calf swelling  Skin: no rash, warm/dry  Neuro: AAOx3, no apparent sensory/motor deficits, coordination intact    Plan: Right leg DVT study, labs, CT imaging to assess RLQ pain, supportive care for anal pimple, anticipate discharge with PMD f/u if no acute findings. Patient seen with KELLY Newton.  Well appearing, sent from PMD Dr. Calvo to r/o right leg DVT, has been having right calf pain with mild swelling for the last couple of days, h/o prior DVT.  Also c/o RLQ pain x2 day without n/v/d, pain nonradiating.  LBM today and nonbloody.  Also c/o pain near rectum with small spots of blood when wiping.  Denies use of blood thinners.    Gen: Alert, NAD, well appearing  Head: NC, AT, PERRL, EOMI, normal lids/conjunctiva  ENT: normal hearing, patent oropharynx without erythema/exudate, uvula midline  Neck: +supple, no tenderness/meningismus/JVD, +Trachea midline  Pulm: Bilateral BS, normal resp effort, no wheeze/stridor/retractions  CV: RRR, no M/R/G, +dist pulses  Abd: soft, ND, +RLQ tenderness +BS, no palpable masses  Rectum: no hemorrhoids, no visible bleeding, no rectal pain, focus of tenderness is small pimple above the rectum in the midline gluteal cleft which looks open, no abscess  Mskel: no edema/erythema/cyanosis, no significant right calf swelling  Skin: no rash, warm/dry  Neuro: AAOx3, no apparent sensory/motor deficits, coordination intact    Plan: Right leg DVT study, labs, CT imaging to assess RLQ pain, supportive care for anal pimple, anticipate discharge with PMD f/u if no acute findings.    Follow up: Patient's test results reviewed.  No right leg DVT.  Has +UTI.  Remaining lab values do not warrant intervention.  CT results reviewed.  Low suspicion for appendicitis on clinical presentation and exam, patient advised to self-monitor.  Also no anal or rectal findings on exam as CT suggests.  Cause for her right leg pain is not clear, no concerning findings on exam.  Discussed results and outcome of today's visit with the patient.  Patient advised to please follow up with another healthcare provider within the next 24 hours and return to the Emergency Department for worsening symptoms or any other concerns.  Patient advised that their doctor may call  to follow up on the specific results of the tests performed today in the emergency department.   Patient appears well on discharge. Patient seen with KELLY Newton.  Well appearing, sent from PMD Dr. Calvo to r/o right leg DVT, has been having right calf pain with mild swelling for the last couple of days, h/o prior DVT.  Also c/o RLQ pain x2 day without n/v/d, pain nonradiating.  LBM today and nonbloody.  Also c/o pain near rectum (not in rectum) with small spots of blood when wiping.  Denies use of blood thinners.    Gen: Alert, NAD, well appearing  Head: NC, AT, PERRL, EOMI, normal lids/conjunctiva  ENT: normal hearing, patent oropharynx without erythema/exudate, uvula midline  Neck: +supple, no tenderness/meningismus/JVD, +Trachea midline  Pulm: Bilateral BS, normal resp effort, no wheeze/stridor/retractions  CV: RRR, no M/R/G, +dist pulses  Abd: soft, ND, +RLQ tenderness +BS, no palpable masses  Rectum: no hemorrhoids, no visible bleeding, no rectal pain, focus of tenderness is small pimple above the rectum in the midline gluteal cleft which looks open, no abscess  Mskel: no edema/erythema/cyanosis, no significant right calf swelling  Skin: no rash, warm/dry  Neuro: AAOx3, no apparent sensory/motor deficits, coordination intact    Plan: Right leg DVT study, labs, CT imaging to assess RLQ pain, supportive care for anal pimple, anticipate discharge with PMD f/u if no acute findings.    Follow up: Patient's test results reviewed.  No right leg DVT.  Has +UTI.  Remaining lab values do not warrant intervention.  CT results reviewed.  Low suspicion for appendicitis on clinical presentation and exam, patient advised to self-monitor, she would not have come to ER for this if she had not been sent for right leg DVT study.  No leukocytosis or vomiting.  Also no anal or rectal findings on exam as CT suggests.  Cause for her right leg pain is not clear, no concerning findings on exam.  Discussed results and outcome of today's visit with the patient.  Patient advised to please follow up with another healthcare provider within the next 24 hours and return to the Emergency Department for worsening symptoms or any other concerns.  Patient advised that their doctor may call  to follow up on the specific results of the tests performed today in the emergency department.   Patient appears well on discharge.

## 2018-08-23 NOTE — ED ADULT TRIAGE NOTE - CHIEF COMPLAINT QUOTE
Right thigh and calf pains for a few days, saw pmd today who advised ed visit to R/O DVT, history of previous DVT to right leg. Also c/o bright red blood from rectal area, states when she wipes its tender in that area.

## 2018-08-23 NOTE — ED PROVIDER NOTE - CARE PLAN
Principal Discharge DX:	UTI (urinary tract infection)  Secondary Diagnosis:	Abdominal pain  Secondary Diagnosis:	Leg pain, right

## 2018-08-24 PROCEDURE — 74177 CT ABD & PELVIS W/CONTRAST: CPT | Mod: 26

## 2018-08-24 RX ORDER — CEFPODOXIME PROXETIL 100 MG
1 TABLET ORAL
Qty: 14 | Refills: 0
Start: 2018-08-24 | End: 2018-08-30

## 2018-08-24 RX ORDER — CEFTRIAXONE 500 MG/1
1 INJECTION, POWDER, FOR SOLUTION INTRAMUSCULAR; INTRAVENOUS ONCE
Qty: 0 | Refills: 0 | Status: COMPLETED | OUTPATIENT
Start: 2018-08-24 | End: 2018-08-24

## 2018-08-24 RX ADMIN — CEFTRIAXONE 100 GRAM(S): 500 INJECTION, POWDER, FOR SOLUTION INTRAMUSCULAR; INTRAVENOUS at 01:18

## 2018-08-24 NOTE — ED ADULT NURSE NOTE - OBJECTIVE STATEMENT
Patient came into the ER with chief complaint of having abdominal pain, noted no complaints of nausea, vomiting, or any weakness.

## 2018-08-25 LAB
CULTURE RESULTS: SIGNIFICANT CHANGE UP
SPECIMEN SOURCE: SIGNIFICANT CHANGE UP

## 2019-12-10 ENCOUNTER — EMERGENCY (EMERGENCY)
Facility: HOSPITAL | Age: 84
LOS: 0 days | Discharge: HOME | End: 2019-12-10
Attending: EMERGENCY MEDICINE
Payer: MEDICARE

## 2019-12-10 VITALS
WEIGHT: 182.98 LBS | RESPIRATION RATE: 18 BRPM | DIASTOLIC BLOOD PRESSURE: 63 MMHG | TEMPERATURE: 99 F | SYSTOLIC BLOOD PRESSURE: 143 MMHG | HEIGHT: 66 IN | OXYGEN SATURATION: 96 % | HEART RATE: 78 BPM

## 2019-12-10 DIAGNOSIS — R42 DIZZINESS AND GIDDINESS: ICD-10-CM

## 2019-12-10 DIAGNOSIS — G62.9 POLYNEUROPATHY, UNSPECIFIED: ICD-10-CM

## 2019-12-10 DIAGNOSIS — Z88.8 ALLERGY STATUS TO OTHER DRUGS, MEDICAMENTS AND BIOLOGICAL SUBSTANCES: ICD-10-CM

## 2019-12-10 DIAGNOSIS — I10 ESSENTIAL (PRIMARY) HYPERTENSION: ICD-10-CM

## 2019-12-10 DIAGNOSIS — M47.9 SPONDYLOSIS, UNSPECIFIED: ICD-10-CM

## 2019-12-10 DIAGNOSIS — M79.7 FIBROMYALGIA: ICD-10-CM

## 2019-12-10 DIAGNOSIS — R11.0 NAUSEA: ICD-10-CM

## 2019-12-10 LAB — GLUCOSE BLDC GLUCOMTR-MCNC: 90 MG/DL — SIGNIFICANT CHANGE UP (ref 70–99)

## 2019-12-10 PROCEDURE — 93010 ELECTROCARDIOGRAM REPORT: CPT

## 2019-12-10 PROCEDURE — 99284 EMERGENCY DEPT VISIT MOD MDM: CPT

## 2019-12-10 PROCEDURE — 70450 CT HEAD/BRAIN W/O DYE: CPT | Mod: 26

## 2019-12-10 RX ORDER — COLCHICINE 0.6 MG
1 TABLET ORAL
Qty: 0 | Refills: 0 | DISCHARGE

## 2019-12-10 RX ORDER — MECLIZINE HCL 12.5 MG
25 TABLET ORAL ONCE
Refills: 0 | Status: COMPLETED | OUTPATIENT
Start: 2019-12-10 | End: 2019-12-10

## 2019-12-10 RX ORDER — ONDANSETRON 8 MG/1
4 TABLET, FILM COATED ORAL ONCE
Refills: 0 | Status: COMPLETED | OUTPATIENT
Start: 2019-12-10 | End: 2019-12-10

## 2019-12-10 RX ORDER — MECLIZINE HCL 12.5 MG
1 TABLET ORAL
Qty: 15 | Refills: 0
Start: 2019-12-10 | End: 2019-12-14

## 2019-12-10 RX ADMIN — Medication 25 MILLIGRAM(S): at 18:51

## 2019-12-10 NOTE — ED ADULT NURSE NOTE - OBJECTIVE STATEMENT
sudden dizziness at 1 pm while sitting in chair with nausea no other neuro deficits Dr Elena made aware to r/o stroke

## 2019-12-10 NOTE — ED ADULT NURSE NOTE - CHPI ED NUR SYMPTOMS NEG
no change in level of consciousness/no vomiting/no loss of consciousness/no numbness/no fever/no blurred vision/no weakness/no confusion

## 2019-12-10 NOTE — ED PROVIDER NOTE - CRANIAL NERVE AND PUPILLARY EXAM
cranial nerves 2-12 intact/Has L. beating horizontal nystagmus. Positive on Nick hallpike. no dysmetria, normal finger to nose

## 2019-12-10 NOTE — ED PROVIDER NOTE - PROGRESS NOTE DETAILS
Pt CT shows no acute abnormalities. Pt is feeling better. Has a neurologist she can follow up with. Able to sit up and ambulate, is feeling ready for d/c.

## 2019-12-10 NOTE — ED PROVIDER NOTE - PATIENT PORTAL LINK FT
You can access the FollowMyHealth Patient Portal offered by [facility] by registering at the following website: [email or web address]. By joining [employer]’s FollowMyHealth portal, you will also be able to view your health information using other applications (apps) compatible with our system.

## 2019-12-25 ENCOUNTER — INPATIENT (INPATIENT)
Facility: HOSPITAL | Age: 84
LOS: 8 days | Discharge: NURSING, REHAB OR LONG-TERM CARE | End: 2020-01-03
Attending: INTERNAL MEDICINE | Admitting: INTERNAL MEDICINE
Payer: MEDICARE

## 2019-12-25 LAB
ALBUMIN SERPL ELPH-MCNC: 3.5 G/DL — SIGNIFICANT CHANGE UP (ref 3.3–5)
ALP SERPL-CCNC: 62 U/L — SIGNIFICANT CHANGE UP (ref 40–120)
ALT FLD-CCNC: 15 U/L — SIGNIFICANT CHANGE UP (ref 12–78)
ANION GAP SERPL CALC-SCNC: 8 MMOL/L — SIGNIFICANT CHANGE UP (ref 5–17)
APPEARANCE UR: CLEAR — SIGNIFICANT CHANGE UP
APTT BLD: 29.9 SEC — SIGNIFICANT CHANGE UP (ref 27.5–36.3)
AST SERPL-CCNC: 16 U/L — SIGNIFICANT CHANGE UP (ref 15–37)
BASOPHILS # BLD AUTO: 0.03 K/UL — SIGNIFICANT CHANGE UP (ref 0–0.2)
BASOPHILS NFR BLD AUTO: 0.5 % — SIGNIFICANT CHANGE UP (ref 0–2)
BILIRUB SERPL-MCNC: 0.4 MG/DL — SIGNIFICANT CHANGE UP (ref 0.2–1.2)
BILIRUB UR-MCNC: NEGATIVE — SIGNIFICANT CHANGE UP
BUN SERPL-MCNC: 13 MG/DL — SIGNIFICANT CHANGE UP (ref 7–23)
CALCIUM SERPL-MCNC: 9 MG/DL — SIGNIFICANT CHANGE UP (ref 8.5–10.1)
CHLORIDE SERPL-SCNC: 112 MMOL/L — HIGH (ref 96–108)
CK MB BLD-MCNC: 1.7 % — SIGNIFICANT CHANGE UP (ref 0–3.5)
CK MB CFR SERPL CALC: 3.9 NG/ML — HIGH (ref 0.5–3.6)
CK SERPL-CCNC: 227 U/L — HIGH (ref 26–192)
CO2 SERPL-SCNC: 24 MMOL/L — SIGNIFICANT CHANGE UP (ref 22–31)
COLOR SPEC: YELLOW — SIGNIFICANT CHANGE UP
CREAT SERPL-MCNC: 0.95 MG/DL — SIGNIFICANT CHANGE UP (ref 0.5–1.3)
DIFF PNL FLD: NEGATIVE — SIGNIFICANT CHANGE UP
EOSINOPHIL # BLD AUTO: 0.05 K/UL — SIGNIFICANT CHANGE UP (ref 0–0.5)
EOSINOPHIL NFR BLD AUTO: 0.8 % — SIGNIFICANT CHANGE UP (ref 0–6)
GLUCOSE SERPL-MCNC: 75 MG/DL — SIGNIFICANT CHANGE UP (ref 70–99)
GLUCOSE UR QL: NEGATIVE MG/DL — SIGNIFICANT CHANGE UP
HCT VFR BLD CALC: 38.5 % — SIGNIFICANT CHANGE UP (ref 34.5–45)
HGB BLD-MCNC: 12.6 G/DL — SIGNIFICANT CHANGE UP (ref 11.5–15.5)
IMM GRANULOCYTES NFR BLD AUTO: 0.2 % — SIGNIFICANT CHANGE UP (ref 0–1.5)
INR BLD: 1.12 RATIO — SIGNIFICANT CHANGE UP (ref 0.88–1.16)
KETONES UR-MCNC: NEGATIVE — SIGNIFICANT CHANGE UP
LEUKOCYTE ESTERASE UR-ACNC: NEGATIVE — SIGNIFICANT CHANGE UP
LYMPHOCYTES # BLD AUTO: 2.03 K/UL — SIGNIFICANT CHANGE UP (ref 1–3.3)
LYMPHOCYTES # BLD AUTO: 32.6 % — SIGNIFICANT CHANGE UP (ref 13–44)
MCHC RBC-ENTMCNC: 27.6 PG — SIGNIFICANT CHANGE UP (ref 27–34)
MCHC RBC-ENTMCNC: 32.7 GM/DL — SIGNIFICANT CHANGE UP (ref 32–36)
MCV RBC AUTO: 84.4 FL — SIGNIFICANT CHANGE UP (ref 80–100)
MONOCYTES # BLD AUTO: 0.63 K/UL — SIGNIFICANT CHANGE UP (ref 0–0.9)
MONOCYTES NFR BLD AUTO: 10.1 % — SIGNIFICANT CHANGE UP (ref 2–14)
NEUTROPHILS # BLD AUTO: 3.47 K/UL — SIGNIFICANT CHANGE UP (ref 1.8–7.4)
NEUTROPHILS NFR BLD AUTO: 55.8 % — SIGNIFICANT CHANGE UP (ref 43–77)
NITRITE UR-MCNC: NEGATIVE — SIGNIFICANT CHANGE UP
NRBC # BLD: 0 /100 WBCS — SIGNIFICANT CHANGE UP (ref 0–0)
PH UR: 6 — SIGNIFICANT CHANGE UP (ref 5–8)
PLATELET # BLD AUTO: 261 K/UL — SIGNIFICANT CHANGE UP (ref 150–400)
POTASSIUM SERPL-MCNC: 3.6 MMOL/L — SIGNIFICANT CHANGE UP (ref 3.5–5.3)
POTASSIUM SERPL-SCNC: 3.6 MMOL/L — SIGNIFICANT CHANGE UP (ref 3.5–5.3)
PROT SERPL-MCNC: 7.8 GM/DL — SIGNIFICANT CHANGE UP (ref 6–8.3)
PROT UR-MCNC: NEGATIVE MG/DL — SIGNIFICANT CHANGE UP
PROTHROM AB SERPL-ACNC: 12.6 SEC — SIGNIFICANT CHANGE UP (ref 10–12.9)
RBC # BLD: 4.56 M/UL — SIGNIFICANT CHANGE UP (ref 3.8–5.2)
RBC # FLD: 14.6 % — HIGH (ref 10.3–14.5)
SODIUM SERPL-SCNC: 144 MMOL/L — SIGNIFICANT CHANGE UP (ref 135–145)
SP GR SPEC: 1.01 — SIGNIFICANT CHANGE UP (ref 1.01–1.02)
TROPONIN I SERPL-MCNC: <.015 NG/ML — SIGNIFICANT CHANGE UP (ref 0.01–0.04)
UROBILINOGEN FLD QL: NEGATIVE MG/DL — SIGNIFICANT CHANGE UP
WBC # BLD: 6.22 K/UL — SIGNIFICANT CHANGE UP (ref 3.8–10.5)
WBC # FLD AUTO: 6.22 K/UL — SIGNIFICANT CHANGE UP (ref 3.8–10.5)

## 2019-12-25 PROCEDURE — 70496 CT ANGIOGRAPHY HEAD: CPT | Mod: 26

## 2019-12-25 PROCEDURE — 70450 CT HEAD/BRAIN W/O DYE: CPT | Mod: 26,59,77

## 2019-12-25 PROCEDURE — 93010 ELECTROCARDIOGRAM REPORT: CPT

## 2019-12-25 PROCEDURE — 71045 X-RAY EXAM CHEST 1 VIEW: CPT | Mod: 26

## 2019-12-25 PROCEDURE — 99285 EMERGENCY DEPT VISIT HI MDM: CPT

## 2019-12-25 PROCEDURE — 99223 1ST HOSP IP/OBS HIGH 75: CPT | Mod: AI

## 2019-12-25 PROCEDURE — 70450 CT HEAD/BRAIN W/O DYE: CPT | Mod: 26,59

## 2019-12-25 PROCEDURE — 70498 CT ANGIOGRAPHY NECK: CPT | Mod: 26

## 2019-12-25 PROCEDURE — 72170 X-RAY EXAM OF PELVIS: CPT | Mod: 26

## 2019-12-25 RX ORDER — TETANUS TOXOID, REDUCED DIPHTHERIA TOXOID AND ACELLULAR PERTUSSIS VACCINE, ADSORBED 5; 2.5; 8; 8; 2.5 [IU]/.5ML; [IU]/.5ML; UG/.5ML; UG/.5ML; UG/.5ML
0.5 SUSPENSION INTRAMUSCULAR ONCE
Refills: 0 | Status: COMPLETED | OUTPATIENT
Start: 2019-12-25 | End: 2019-12-25

## 2019-12-25 RX ORDER — ACETAMINOPHEN 500 MG
1000 TABLET ORAL ONCE
Refills: 0 | Status: COMPLETED | OUTPATIENT
Start: 2019-12-25 | End: 2019-12-25

## 2019-12-25 RX ORDER — METOCLOPRAMIDE HCL 10 MG
10 TABLET ORAL ONCE
Refills: 0 | Status: COMPLETED | OUTPATIENT
Start: 2019-12-25 | End: 2019-12-25

## 2019-12-25 RX ORDER — ONDANSETRON 8 MG/1
8 TABLET, FILM COATED ORAL ONCE
Refills: 0 | Status: COMPLETED | OUTPATIENT
Start: 2019-12-25 | End: 2019-12-25

## 2019-12-25 RX ADMIN — Medication 104 MILLIGRAM(S): at 21:47

## 2019-12-25 RX ADMIN — ONDANSETRON 8 MILLIGRAM(S): 8 TABLET, FILM COATED ORAL at 20:36

## 2019-12-25 RX ADMIN — Medication 400 MILLIGRAM(S): at 21:47

## 2019-12-25 RX ADMIN — TETANUS TOXOID, REDUCED DIPHTHERIA TOXOID AND ACELLULAR PERTUSSIS VACCINE, ADSORBED 0.5 MILLILITER(S): 5; 2.5; 8; 8; 2.5 SUSPENSION INTRAMUSCULAR at 20:38

## 2019-12-26 DIAGNOSIS — G45.9 TRANSIENT CEREBRAL ISCHEMIC ATTACK, UNSPECIFIED: ICD-10-CM

## 2019-12-26 DIAGNOSIS — I10 ESSENTIAL (PRIMARY) HYPERTENSION: ICD-10-CM
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
## 2019-12-26 DIAGNOSIS — I63.9 CEREBRAL INFARCTION, UNSPECIFIED: ICD-10-CM

## 2019-12-26 LAB
ALBUMIN SERPL ELPH-MCNC: 3.3 G/DL — SIGNIFICANT CHANGE UP (ref 3.3–5)
ALP SERPL-CCNC: 63 U/L — SIGNIFICANT CHANGE UP (ref 40–120)
ALT FLD-CCNC: 16 U/L — SIGNIFICANT CHANGE UP (ref 12–78)
ANION GAP SERPL CALC-SCNC: 5 MMOL/L — SIGNIFICANT CHANGE UP (ref 5–17)
AST SERPL-CCNC: 15 U/L — SIGNIFICANT CHANGE UP (ref 15–37)
BILIRUB SERPL-MCNC: 0.4 MG/DL — SIGNIFICANT CHANGE UP (ref 0.2–1.2)
BUN SERPL-MCNC: 14 MG/DL — SIGNIFICANT CHANGE UP (ref 7–23)
CALCIUM SERPL-MCNC: 8.6 MG/DL — SIGNIFICANT CHANGE UP (ref 8.5–10.1)
CHLORIDE SERPL-SCNC: 112 MMOL/L — HIGH (ref 96–108)
CHOLEST SERPL-MCNC: 211 MG/DL — HIGH (ref 10–199)
CO2 SERPL-SCNC: 28 MMOL/L — SIGNIFICANT CHANGE UP (ref 22–31)
CREAT SERPL-MCNC: 1.06 MG/DL — SIGNIFICANT CHANGE UP (ref 0.5–1.3)
GLUCOSE SERPL-MCNC: 92 MG/DL — SIGNIFICANT CHANGE UP (ref 70–99)
HCT VFR BLD CALC: 37.8 % — SIGNIFICANT CHANGE UP (ref 34.5–45)
HDLC SERPL-MCNC: 60 MG/DL — SIGNIFICANT CHANGE UP
HGB BLD-MCNC: 12.1 G/DL — SIGNIFICANT CHANGE UP (ref 11.5–15.5)
LIPID PNL WITH DIRECT LDL SERPL: 135 MG/DL — HIGH
MAGNESIUM SERPL-MCNC: 2.2 MG/DL — SIGNIFICANT CHANGE UP (ref 1.6–2.6)
MCHC RBC-ENTMCNC: 27.3 PG — SIGNIFICANT CHANGE UP (ref 27–34)
MCHC RBC-ENTMCNC: 32 GM/DL — SIGNIFICANT CHANGE UP (ref 32–36)
MCV RBC AUTO: 85.3 FL — SIGNIFICANT CHANGE UP (ref 80–100)
NRBC # BLD: 0 /100 WBCS — SIGNIFICANT CHANGE UP (ref 0–0)
PHOSPHATE SERPL-MCNC: 2.7 MG/DL — SIGNIFICANT CHANGE UP (ref 2.5–4.5)
PLATELET # BLD AUTO: 277 K/UL — SIGNIFICANT CHANGE UP (ref 150–400)
POTASSIUM SERPL-MCNC: 3.5 MMOL/L — SIGNIFICANT CHANGE UP (ref 3.5–5.3)
POTASSIUM SERPL-SCNC: 3.5 MMOL/L — SIGNIFICANT CHANGE UP (ref 3.5–5.3)
PROT SERPL-MCNC: 7.3 GM/DL — SIGNIFICANT CHANGE UP (ref 6–8.3)
RBC # BLD: 4.43 M/UL — SIGNIFICANT CHANGE UP (ref 3.8–5.2)
RBC # FLD: 14.8 % — HIGH (ref 10.3–14.5)
SODIUM SERPL-SCNC: 145 MMOL/L — SIGNIFICANT CHANGE UP (ref 135–145)
TOTAL CHOLESTEROL/HDL RATIO MEASUREMENT: 3.5 RATIO — SIGNIFICANT CHANGE UP (ref 3.3–7.1)
TRIGL SERPL-MCNC: 81 MG/DL — SIGNIFICANT CHANGE UP (ref 10–149)
WBC # BLD: 5.48 K/UL — SIGNIFICANT CHANGE UP (ref 3.8–10.5)
WBC # FLD AUTO: 5.48 K/UL — SIGNIFICANT CHANGE UP (ref 3.8–10.5)

## 2019-12-26 PROCEDURE — 70551 MRI BRAIN STEM W/O DYE: CPT | Mod: 26

## 2019-12-26 PROCEDURE — 93880 EXTRACRANIAL BILAT STUDY: CPT | Mod: 26

## 2019-12-26 PROCEDURE — 93306 TTE W/DOPPLER COMPLETE: CPT | Mod: 26

## 2019-12-26 RX ORDER — ACETAMINOPHEN 500 MG
650 TABLET ORAL EVERY 6 HOURS
Refills: 0 | Status: DISCONTINUED | OUTPATIENT
Start: 2019-12-26 | End: 2020-01-03

## 2019-12-26 RX ORDER — ENOXAPARIN SODIUM 100 MG/ML
40 INJECTION SUBCUTANEOUS DAILY
Refills: 0 | Status: DISCONTINUED | OUTPATIENT
Start: 2019-12-26 | End: 2020-01-03

## 2019-12-26 RX ORDER — CLOPIDOGREL BISULFATE 75 MG/1
75 TABLET, FILM COATED ORAL DAILY
Refills: 0 | Status: DISCONTINUED | OUTPATIENT
Start: 2019-12-26 | End: 2020-01-03

## 2019-12-26 RX ORDER — SIMVASTATIN 20 MG/1
20 TABLET, FILM COATED ORAL AT BEDTIME
Refills: 0 | Status: DISCONTINUED | OUTPATIENT
Start: 2019-12-26 | End: 2020-01-03

## 2019-12-26 RX ORDER — ASPIRIN/CALCIUM CARB/MAGNESIUM 324 MG
81 TABLET ORAL DAILY
Refills: 0 | Status: DISCONTINUED | OUTPATIENT
Start: 2019-12-26 | End: 2020-01-03

## 2019-12-26 RX ADMIN — ENOXAPARIN SODIUM 40 MILLIGRAM(S): 100 INJECTION SUBCUTANEOUS at 14:44

## 2019-12-26 RX ADMIN — Medication 650 MILLIGRAM(S): at 21:16

## 2019-12-26 RX ADMIN — SIMVASTATIN 20 MILLIGRAM(S): 20 TABLET, FILM COATED ORAL at 22:07

## 2019-12-26 RX ADMIN — Medication 81 MILLIGRAM(S): at 12:39

## 2019-12-26 RX ADMIN — Medication 650 MILLIGRAM(S): at 15:15

## 2019-12-26 RX ADMIN — Medication 650 MILLIGRAM(S): at 20:04

## 2019-12-26 RX ADMIN — CLOPIDOGREL BISULFATE 75 MILLIGRAM(S): 75 TABLET, FILM COATED ORAL at 12:39

## 2019-12-26 RX ADMIN — Medication 650 MILLIGRAM(S): at 14:44

## 2019-12-27 ENCOUNTER — TRANSCRIPTION ENCOUNTER (OUTPATIENT)
Age: 84
End: 2019-12-27

## 2019-12-27 LAB
CULTURE RESULTS: NO GROWTH — SIGNIFICANT CHANGE UP
ERYTHROCYTE [SEDIMENTATION RATE] IN BLOOD: 14 MM/HR — SIGNIFICANT CHANGE UP (ref 0–20)
SPECIMEN SOURCE: SIGNIFICANT CHANGE UP

## 2019-12-27 RX ORDER — CLOPIDOGREL BISULFATE 75 MG/1
1 TABLET, FILM COATED ORAL
Qty: 0 | Refills: 0 | DISCHARGE
Start: 2019-12-27

## 2019-12-27 RX ORDER — ASPIRIN/CALCIUM CARB/MAGNESIUM 324 MG
1 TABLET ORAL
Qty: 0 | Refills: 0 | DISCHARGE
Start: 2019-12-27

## 2019-12-27 RX ORDER — ACETAMINOPHEN 500 MG
2 TABLET ORAL
Qty: 0 | Refills: 0 | DISCHARGE
Start: 2019-12-27

## 2019-12-27 RX ORDER — SIMVASTATIN 20 MG/1
1 TABLET, FILM COATED ORAL
Qty: 0 | Refills: 0 | DISCHARGE
Start: 2019-12-27

## 2019-12-27 RX ADMIN — SIMVASTATIN 20 MILLIGRAM(S): 20 TABLET, FILM COATED ORAL at 21:11

## 2019-12-27 RX ADMIN — Medication 650 MILLIGRAM(S): at 11:35

## 2019-12-27 RX ADMIN — Medication 81 MILLIGRAM(S): at 11:07

## 2019-12-27 RX ADMIN — CLOPIDOGREL BISULFATE 75 MILLIGRAM(S): 75 TABLET, FILM COATED ORAL at 11:07

## 2019-12-27 RX ADMIN — Medication 650 MILLIGRAM(S): at 11:06

## 2019-12-27 RX ADMIN — ENOXAPARIN SODIUM 40 MILLIGRAM(S): 100 INJECTION SUBCUTANEOUS at 11:33

## 2019-12-28 RX ORDER — ACETAMINOPHEN 500 MG
650 TABLET ORAL EVERY 6 HOURS
Refills: 0 | Status: DISCONTINUED | OUTPATIENT
Start: 2019-12-28 | End: 2019-12-28

## 2019-12-28 RX ORDER — ONDANSETRON 8 MG/1
4 TABLET, FILM COATED ORAL ONCE
Refills: 0 | Status: COMPLETED | OUTPATIENT
Start: 2019-12-28 | End: 2019-12-28

## 2019-12-28 RX ADMIN — Medication 650 MILLIGRAM(S): at 05:49

## 2019-12-28 RX ADMIN — SIMVASTATIN 20 MILLIGRAM(S): 20 TABLET, FILM COATED ORAL at 21:23

## 2019-12-28 RX ADMIN — Medication 81 MILLIGRAM(S): at 12:22

## 2019-12-28 RX ADMIN — ONDANSETRON 4 MILLIGRAM(S): 8 TABLET, FILM COATED ORAL at 06:09

## 2019-12-28 RX ADMIN — ENOXAPARIN SODIUM 40 MILLIGRAM(S): 100 INJECTION SUBCUTANEOUS at 12:22

## 2019-12-28 RX ADMIN — CLOPIDOGREL BISULFATE 75 MILLIGRAM(S): 75 TABLET, FILM COATED ORAL at 12:22

## 2019-12-28 RX ADMIN — Medication 650 MILLIGRAM(S): at 07:10

## 2019-12-29 RX ADMIN — Medication 81 MILLIGRAM(S): at 11:40

## 2019-12-29 RX ADMIN — SIMVASTATIN 20 MILLIGRAM(S): 20 TABLET, FILM COATED ORAL at 21:16

## 2019-12-29 RX ADMIN — CLOPIDOGREL BISULFATE 75 MILLIGRAM(S): 75 TABLET, FILM COATED ORAL at 11:41

## 2019-12-29 RX ADMIN — ENOXAPARIN SODIUM 40 MILLIGRAM(S): 100 INJECTION SUBCUTANEOUS at 11:41

## 2019-12-30 LAB
ANION GAP SERPL CALC-SCNC: 10 MMOL/L — SIGNIFICANT CHANGE UP (ref 5–17)
BUN SERPL-MCNC: 18 MG/DL — SIGNIFICANT CHANGE UP (ref 7–23)
CALCIUM SERPL-MCNC: 8.8 MG/DL — SIGNIFICANT CHANGE UP (ref 8.5–10.1)
CHLORIDE SERPL-SCNC: 111 MMOL/L — HIGH (ref 96–108)
CO2 SERPL-SCNC: 22 MMOL/L — SIGNIFICANT CHANGE UP (ref 22–31)
CREAT SERPL-MCNC: 0.95 MG/DL — SIGNIFICANT CHANGE UP (ref 0.5–1.3)
GLUCOSE SERPL-MCNC: 81 MG/DL — SIGNIFICANT CHANGE UP (ref 70–99)
HCT VFR BLD CALC: 39.6 % — SIGNIFICANT CHANGE UP (ref 34.5–45)
HGB BLD-MCNC: 12.5 G/DL — SIGNIFICANT CHANGE UP (ref 11.5–15.5)
MCHC RBC-ENTMCNC: 27.4 PG — SIGNIFICANT CHANGE UP (ref 27–34)
MCHC RBC-ENTMCNC: 31.6 GM/DL — LOW (ref 32–36)
MCV RBC AUTO: 86.8 FL — SIGNIFICANT CHANGE UP (ref 80–100)
NRBC # BLD: 0 /100 WBCS — SIGNIFICANT CHANGE UP (ref 0–0)
PLATELET # BLD AUTO: 277 K/UL — SIGNIFICANT CHANGE UP (ref 150–400)
POTASSIUM SERPL-MCNC: 4 MMOL/L — SIGNIFICANT CHANGE UP (ref 3.5–5.3)
POTASSIUM SERPL-SCNC: 4 MMOL/L — SIGNIFICANT CHANGE UP (ref 3.5–5.3)
RBC # BLD: 4.56 M/UL — SIGNIFICANT CHANGE UP (ref 3.8–5.2)
RBC # FLD: 14.7 % — HIGH (ref 10.3–14.5)
SODIUM SERPL-SCNC: 143 MMOL/L — SIGNIFICANT CHANGE UP (ref 135–145)
WBC # BLD: 4.76 K/UL — SIGNIFICANT CHANGE UP (ref 3.8–10.5)
WBC # FLD AUTO: 4.76 K/UL — SIGNIFICANT CHANGE UP (ref 3.8–10.5)

## 2019-12-30 RX ORDER — OXYCODONE HYDROCHLORIDE 5 MG/1
10 TABLET ORAL ONCE
Refills: 0 | Status: DISCONTINUED | OUTPATIENT
Start: 2019-12-30 | End: 2019-12-30

## 2019-12-30 RX ORDER — OXYCODONE HYDROCHLORIDE 5 MG/1
5 TABLET ORAL ONCE
Refills: 0 | Status: DISCONTINUED | OUTPATIENT
Start: 2019-12-30 | End: 2019-12-31

## 2019-12-30 RX ADMIN — ENOXAPARIN SODIUM 40 MILLIGRAM(S): 100 INJECTION SUBCUTANEOUS at 11:26

## 2019-12-30 RX ADMIN — SIMVASTATIN 20 MILLIGRAM(S): 20 TABLET, FILM COATED ORAL at 21:19

## 2019-12-30 RX ADMIN — OXYCODONE HYDROCHLORIDE 10 MILLIGRAM(S): 5 TABLET ORAL at 08:24

## 2019-12-30 RX ADMIN — OXYCODONE HYDROCHLORIDE 10 MILLIGRAM(S): 5 TABLET ORAL at 09:25

## 2019-12-30 RX ADMIN — Medication 650 MILLIGRAM(S): at 22:19

## 2019-12-30 RX ADMIN — Medication 81 MILLIGRAM(S): at 11:26

## 2019-12-30 RX ADMIN — Medication 650 MILLIGRAM(S): at 21:19

## 2019-12-30 RX ADMIN — CLOPIDOGREL BISULFATE 75 MILLIGRAM(S): 75 TABLET, FILM COATED ORAL at 11:26

## 2019-12-31 RX ADMIN — SIMVASTATIN 20 MILLIGRAM(S): 20 TABLET, FILM COATED ORAL at 21:08

## 2019-12-31 RX ADMIN — OXYCODONE HYDROCHLORIDE 5 MILLIGRAM(S): 5 TABLET ORAL at 07:32

## 2019-12-31 RX ADMIN — Medication 81 MILLIGRAM(S): at 11:42

## 2019-12-31 RX ADMIN — OXYCODONE HYDROCHLORIDE 5 MILLIGRAM(S): 5 TABLET ORAL at 06:32

## 2019-12-31 RX ADMIN — CLOPIDOGREL BISULFATE 75 MILLIGRAM(S): 75 TABLET, FILM COATED ORAL at 11:42

## 2019-12-31 RX ADMIN — ENOXAPARIN SODIUM 40 MILLIGRAM(S): 100 INJECTION SUBCUTANEOUS at 11:42

## 2020-01-01 DIAGNOSIS — I63.9 CEREBRAL INFARCTION, UNSPECIFIED: ICD-10-CM

## 2020-01-01 RX ORDER — TRAMADOL HYDROCHLORIDE 50 MG/1
25 TABLET ORAL THREE TIMES A DAY
Refills: 0 | Status: DISCONTINUED | OUTPATIENT
Start: 2020-01-01 | End: 2020-01-03

## 2020-01-01 RX ORDER — GABAPENTIN 400 MG/1
100 CAPSULE ORAL DAILY
Refills: 0 | Status: DISCONTINUED | OUTPATIENT
Start: 2020-01-01 | End: 2020-01-03

## 2020-01-01 RX ADMIN — TRAMADOL HYDROCHLORIDE 25 MILLIGRAM(S): 50 TABLET ORAL at 20:49

## 2020-01-01 RX ADMIN — TRAMADOL HYDROCHLORIDE 25 MILLIGRAM(S): 50 TABLET ORAL at 21:49

## 2020-01-01 RX ADMIN — SIMVASTATIN 20 MILLIGRAM(S): 20 TABLET, FILM COATED ORAL at 21:19

## 2020-01-01 RX ADMIN — TRAMADOL HYDROCHLORIDE 25 MILLIGRAM(S): 50 TABLET ORAL at 09:10

## 2020-01-01 RX ADMIN — TRAMADOL HYDROCHLORIDE 25 MILLIGRAM(S): 50 TABLET ORAL at 10:10

## 2020-01-01 RX ADMIN — Medication 81 MILLIGRAM(S): at 11:07

## 2020-01-01 RX ADMIN — Medication 650 MILLIGRAM(S): at 07:11

## 2020-01-01 RX ADMIN — CLOPIDOGREL BISULFATE 75 MILLIGRAM(S): 75 TABLET, FILM COATED ORAL at 11:07

## 2020-01-01 RX ADMIN — Medication 650 MILLIGRAM(S): at 08:11

## 2020-01-01 RX ADMIN — ENOXAPARIN SODIUM 40 MILLIGRAM(S): 100 INJECTION SUBCUTANEOUS at 11:07

## 2020-01-02 RX ORDER — POLYETHYLENE GLYCOL 3350 17 G/17G
17 POWDER, FOR SOLUTION ORAL EVERY 24 HOURS
Refills: 0 | Status: DISCONTINUED | OUTPATIENT
Start: 2020-01-02 | End: 2020-01-03

## 2020-01-02 RX ADMIN — TRAMADOL HYDROCHLORIDE 25 MILLIGRAM(S): 50 TABLET ORAL at 19:48

## 2020-01-02 RX ADMIN — GABAPENTIN 100 MILLIGRAM(S): 400 CAPSULE ORAL at 11:29

## 2020-01-02 RX ADMIN — ENOXAPARIN SODIUM 40 MILLIGRAM(S): 100 INJECTION SUBCUTANEOUS at 11:29

## 2020-01-02 RX ADMIN — SIMVASTATIN 20 MILLIGRAM(S): 20 TABLET, FILM COATED ORAL at 21:40

## 2020-01-02 RX ADMIN — POLYETHYLENE GLYCOL 3350 17 GRAM(S): 17 POWDER, FOR SOLUTION ORAL at 21:40

## 2020-01-02 RX ADMIN — Medication 81 MILLIGRAM(S): at 11:29

## 2020-01-02 RX ADMIN — TRAMADOL HYDROCHLORIDE 25 MILLIGRAM(S): 50 TABLET ORAL at 20:48

## 2020-01-02 RX ADMIN — CLOPIDOGREL BISULFATE 75 MILLIGRAM(S): 75 TABLET, FILM COATED ORAL at 11:29

## 2020-01-03 ENCOUNTER — TRANSCRIPTION ENCOUNTER (OUTPATIENT)
Age: 85
End: 2020-01-03

## 2020-01-03 RX ADMIN — GABAPENTIN 100 MILLIGRAM(S): 400 CAPSULE ORAL at 12:36

## 2020-01-03 RX ADMIN — Medication 81 MILLIGRAM(S): at 12:36

## 2020-01-03 RX ADMIN — CLOPIDOGREL BISULFATE 75 MILLIGRAM(S): 75 TABLET, FILM COATED ORAL at 12:36

## 2020-01-03 RX ADMIN — ENOXAPARIN SODIUM 40 MILLIGRAM(S): 100 INJECTION SUBCUTANEOUS at 12:36

## 2020-01-07 ENCOUNTER — OUTPATIENT (OUTPATIENT)
Dept: OUTPATIENT SERVICES | Facility: HOSPITAL | Age: 85
LOS: 1 days | Discharge: HOME | End: 2020-01-07
Payer: MEDICARE

## 2020-01-07 DIAGNOSIS — M79.643 PAIN IN UNSPECIFIED HAND: ICD-10-CM

## 2020-01-07 DIAGNOSIS — N41.9 INFLAMMATORY DISEASE OF PROSTATE, UNSPECIFIED: ICD-10-CM

## 2020-01-07 PROCEDURE — 73130 X-RAY EXAM OF HAND: CPT | Mod: 26,50

## 2020-01-07 PROCEDURE — 99223 1ST HOSP IP/OBS HIGH 75: CPT

## 2020-01-08 DIAGNOSIS — M79.643 PAIN IN UNSPECIFIED HAND: ICD-10-CM

## 2020-01-09 DIAGNOSIS — Z79.82 LONG TERM (CURRENT) USE OF ASPIRIN: ICD-10-CM

## 2020-01-09 DIAGNOSIS — I08.2 RHEUMATIC DISORDERS OF BOTH AORTIC AND TRICUSPID VALVES: ICD-10-CM

## 2020-01-09 DIAGNOSIS — Z91.81 HISTORY OF FALLING: ICD-10-CM

## 2020-01-09 DIAGNOSIS — Z23 ENCOUNTER FOR IMMUNIZATION: ICD-10-CM

## 2020-01-09 DIAGNOSIS — G81.94 HEMIPLEGIA, UNSPECIFIED AFFECTING LEFT NONDOMINANT SIDE: ICD-10-CM

## 2020-01-09 DIAGNOSIS — G45.9 TRANSIENT CEREBRAL ISCHEMIC ATTACK, UNSPECIFIED: ICD-10-CM

## 2020-01-09 DIAGNOSIS — R47.01 APHASIA: ICD-10-CM

## 2020-01-09 DIAGNOSIS — M79.7 FIBROMYALGIA: ICD-10-CM

## 2020-01-09 DIAGNOSIS — R51 HEADACHE: ICD-10-CM

## 2020-01-09 DIAGNOSIS — I10 ESSENTIAL (PRIMARY) HYPERTENSION: ICD-10-CM

## 2020-06-02 NOTE — PHYSICAL THERAPY INITIAL EVALUATION ADULT - ACTIVE RANGE OF MOTION EXAMINATION, REHAB EVAL
See US Report in     
deficits as listed below/R Hip Flexion: 0 to 90, R Hip Abduction: 0 to 35, R Hip Extension: 0

## 2021-04-20 ENCOUNTER — EMERGENCY (EMERGENCY)
Facility: HOSPITAL | Age: 86
LOS: 0 days | Discharge: HOME | End: 2021-04-20
Attending: STUDENT IN AN ORGANIZED HEALTH CARE EDUCATION/TRAINING PROGRAM
Payer: MEDICARE

## 2021-04-20 VITALS
RESPIRATION RATE: 18 BRPM | WEIGHT: 182.1 LBS | HEART RATE: 56 BPM | HEIGHT: 66.5 IN | TEMPERATURE: 98 F | OXYGEN SATURATION: 96 % | DIASTOLIC BLOOD PRESSURE: 82 MMHG | SYSTOLIC BLOOD PRESSURE: 156 MMHG

## 2021-04-20 VITALS
RESPIRATION RATE: 18 BRPM | SYSTOLIC BLOOD PRESSURE: 155 MMHG | HEART RATE: 70 BPM | TEMPERATURE: 99 F | DIASTOLIC BLOOD PRESSURE: 81 MMHG | OXYGEN SATURATION: 97 %

## 2021-04-20 DIAGNOSIS — G62.9 POLYNEUROPATHY, UNSPECIFIED: ICD-10-CM

## 2021-04-20 DIAGNOSIS — R21 RASH AND OTHER NONSPECIFIC SKIN ERUPTION: ICD-10-CM

## 2021-04-20 DIAGNOSIS — Z86.73 PERSONAL HISTORY OF TRANSIENT ISCHEMIC ATTACK (TIA), AND CEREBRAL INFARCTION WITHOUT RESIDUAL DEFICITS: ICD-10-CM

## 2021-04-20 DIAGNOSIS — Z88.8 ALLERGY STATUS TO OTHER DRUGS, MEDICAMENTS AND BIOLOGICAL SUBSTANCES STATUS: ICD-10-CM

## 2021-04-20 DIAGNOSIS — M19.90 UNSPECIFIED OSTEOARTHRITIS, UNSPECIFIED SITE: ICD-10-CM

## 2021-04-20 DIAGNOSIS — L29.9 PRURITUS, UNSPECIFIED: ICD-10-CM

## 2021-04-20 DIAGNOSIS — I10 ESSENTIAL (PRIMARY) HYPERTENSION: ICD-10-CM

## 2021-04-20 DIAGNOSIS — M79.7 FIBROMYALGIA: ICD-10-CM

## 2021-04-20 LAB
ALBUMIN SERPL ELPH-MCNC: 3.5 G/DL — SIGNIFICANT CHANGE UP (ref 3.3–5)
ALP SERPL-CCNC: 61 U/L — SIGNIFICANT CHANGE UP (ref 40–120)
ALT FLD-CCNC: 19 U/L — SIGNIFICANT CHANGE UP (ref 12–78)
ANION GAP SERPL CALC-SCNC: 6 MMOL/L — SIGNIFICANT CHANGE UP (ref 5–17)
APTT BLD: 25.1 SEC — LOW (ref 27.5–35.5)
AST SERPL-CCNC: 31 U/L — SIGNIFICANT CHANGE UP (ref 15–37)
BASOPHILS # BLD AUTO: 0.04 K/UL — SIGNIFICANT CHANGE UP (ref 0–0.2)
BASOPHILS NFR BLD AUTO: 0.7 % — SIGNIFICANT CHANGE UP (ref 0–2)
BILIRUB SERPL-MCNC: 0.4 MG/DL — SIGNIFICANT CHANGE UP (ref 0.2–1.2)
BUN SERPL-MCNC: 16 MG/DL — SIGNIFICANT CHANGE UP (ref 7–23)
CALCIUM SERPL-MCNC: 9.1 MG/DL — SIGNIFICANT CHANGE UP (ref 8.5–10.1)
CHLORIDE SERPL-SCNC: 110 MMOL/L — HIGH (ref 96–108)
CO2 SERPL-SCNC: 24 MMOL/L — SIGNIFICANT CHANGE UP (ref 22–31)
CREAT SERPL-MCNC: 1.1 MG/DL — SIGNIFICANT CHANGE UP (ref 0.5–1.3)
EOSINOPHIL # BLD AUTO: 0.21 K/UL — SIGNIFICANT CHANGE UP (ref 0–0.5)
EOSINOPHIL NFR BLD AUTO: 3.8 % — SIGNIFICANT CHANGE UP (ref 0–6)
GLUCOSE SERPL-MCNC: 68 MG/DL — LOW (ref 70–99)
HCT VFR BLD CALC: 39.8 % — SIGNIFICANT CHANGE UP (ref 34.5–45)
HGB BLD-MCNC: 12.9 G/DL — SIGNIFICANT CHANGE UP (ref 11.5–15.5)
IMM GRANULOCYTES NFR BLD AUTO: 0.2 % — SIGNIFICANT CHANGE UP (ref 0–1.5)
INR BLD: 1.07 RATIO — SIGNIFICANT CHANGE UP (ref 0.88–1.16)
LACTATE SERPL-SCNC: 0.9 MMOL/L — SIGNIFICANT CHANGE UP (ref 0.7–2)
LYMPHOCYTES # BLD AUTO: 2.54 K/UL — SIGNIFICANT CHANGE UP (ref 1–3.3)
LYMPHOCYTES # BLD AUTO: 45.4 % — HIGH (ref 13–44)
MAGNESIUM SERPL-MCNC: 2.4 MG/DL — SIGNIFICANT CHANGE UP (ref 1.6–2.6)
MCHC RBC-ENTMCNC: 27.6 PG — SIGNIFICANT CHANGE UP (ref 27–34)
MCHC RBC-ENTMCNC: 32.4 GM/DL — SIGNIFICANT CHANGE UP (ref 32–36)
MCV RBC AUTO: 85 FL — SIGNIFICANT CHANGE UP (ref 80–100)
MONOCYTES # BLD AUTO: 0.63 K/UL — SIGNIFICANT CHANGE UP (ref 0–0.9)
MONOCYTES NFR BLD AUTO: 11.3 % — SIGNIFICANT CHANGE UP (ref 2–14)
NEUTROPHILS # BLD AUTO: 2.16 K/UL — SIGNIFICANT CHANGE UP (ref 1.8–7.4)
NEUTROPHILS NFR BLD AUTO: 38.6 % — LOW (ref 43–77)
NRBC # BLD: 0 /100 WBCS — SIGNIFICANT CHANGE UP (ref 0–0)
PLATELET # BLD AUTO: 195 K/UL — SIGNIFICANT CHANGE UP (ref 150–400)
POTASSIUM SERPL-MCNC: 4.6 MMOL/L — SIGNIFICANT CHANGE UP (ref 3.5–5.3)
POTASSIUM SERPL-SCNC: 4.6 MMOL/L — SIGNIFICANT CHANGE UP (ref 3.5–5.3)
PROT SERPL-MCNC: 8 GM/DL — SIGNIFICANT CHANGE UP (ref 6–8.3)
PROTHROM AB SERPL-ACNC: 12.4 SEC — SIGNIFICANT CHANGE UP (ref 10.6–13.6)
RBC # BLD: 4.68 M/UL — SIGNIFICANT CHANGE UP (ref 3.8–5.2)
RBC # FLD: 15.4 % — HIGH (ref 10.3–14.5)
SODIUM SERPL-SCNC: 140 MMOL/L — SIGNIFICANT CHANGE UP (ref 135–145)
TROPONIN I SERPL-MCNC: <.015 NG/ML — SIGNIFICANT CHANGE UP (ref 0.01–0.04)
TSH SERPL-MCNC: 2.61 UIU/ML — SIGNIFICANT CHANGE UP (ref 0.36–3.74)
WBC # BLD: 5.59 K/UL — SIGNIFICANT CHANGE UP (ref 3.8–10.5)
WBC # FLD AUTO: 5.59 K/UL — SIGNIFICANT CHANGE UP (ref 3.8–10.5)

## 2021-04-20 PROCEDURE — 70496 CT ANGIOGRAPHY HEAD: CPT | Mod: 26,MA

## 2021-04-20 PROCEDURE — 99285 EMERGENCY DEPT VISIT HI MDM: CPT

## 2021-04-20 PROCEDURE — 70450 CT HEAD/BRAIN W/O DYE: CPT | Mod: 26,59,MA

## 2021-04-20 PROCEDURE — 93010 ELECTROCARDIOGRAM REPORT: CPT

## 2021-04-20 PROCEDURE — 70498 CT ANGIOGRAPHY NECK: CPT | Mod: 26,MA

## 2021-04-20 RX ORDER — DIPHENHYDRAMINE HCL 50 MG
25 CAPSULE ORAL ONCE
Refills: 0 | Status: COMPLETED | OUTPATIENT
Start: 2021-04-20 | End: 2021-04-20

## 2021-04-20 RX ORDER — OMEPRAZOLE 10 MG/1
0 CAPSULE, DELAYED RELEASE ORAL
Qty: 0 | Refills: 0 | DISCHARGE

## 2021-04-20 RX ORDER — DULOXETINE HYDROCHLORIDE 30 MG/1
1 CAPSULE, DELAYED RELEASE ORAL
Qty: 0 | Refills: 0 | DISCHARGE

## 2021-04-20 RX ADMIN — Medication 1 TABLET(S): at 21:36

## 2021-04-20 RX ADMIN — Medication 25 MILLIGRAM(S): at 17:57

## 2021-04-20 NOTE — ED PROVIDER NOTE - NS ED MD DISPO DISCHARGE
Take medications as prescribed. Take Aspirin 325mg Orally twice daily for SIX WEEKS to prevent clots. It is highly recommended you follow up with your Primary Care Physician within 1 month of discharge to discuss recent surgery and medications. Home Take medications as prescribed. Take Aspirin 325mg Orally twice daily for SIX WEEKS to prevent clots.  Follow up Dr Funk 10-12 days FROM Surgery;  wound check and staple removal   It is highly recommended you follow up with your Primary Care Physician within 1 month of discharge to discuss recent surgery and medications.  Please call for an appointment Take medications as prescribed. Take Aspirin 325mg Orally twice daily for SIX WEEKS to prevent clots.  Follow up Dr Funk 10-12 days FROM Surgery;  wound check and staple/suture removal   It is highly recommended you follow up with your Primary Care Physician within 1 month of discharge to discuss recent surgery and medications.  Please call for an appointment

## 2021-04-20 NOTE — ED ADULT NURSE NOTE - PMH
CVA (cerebral vascular accident)  left side weakness  Fibromyalgia    HTN (hypertension)    Hypertension    Neuropathy    Osteoarthritis

## 2021-04-20 NOTE — ED ADULT NURSE NOTE - ED STAT RN HANDOFF DETAILS
Handoff given to ESCOBAR Smith. Patient is awake and alert. IV access right AC 20g. Safety and environmental checks maintained. Patient has no further complaints at this time. Plan of care endorsed to incoming RN.

## 2021-04-20 NOTE — ED PROVIDER NOTE - SKIN, MLM
Skin normal color for race, warm, dry and intact. Raised black lesion diffusely on face neck, torso and extremities.

## 2021-04-20 NOTE — ED PROVIDER NOTE - PROGRESS NOTE DETAILS
spoke to dr anderson keyes (radiologist who read pt's ct ), asked to review pt's orbits on ct, no infection noted, pt started on augmentin for her rash and told to follow up with her dermatologist

## 2021-04-20 NOTE — ED PROVIDER NOTE - OBJECTIVE STATEMENT
87 y/o F with a PMHx of HTN, Osteoarthritis, Neuropathy, Fibromyalgia and CVA x2 (w/ residual b/l weakness) presents to the ED c/o spots noted to face, groin and b/l arms x1 week. Today, the spots started to itch as well as itching in her eyes. Patient states she is starting to have vision changes to both eyes. Patient was told that if the spots began itching around her eyes to come to the ER for evaluation. Patient admits to seeing double vision in her LT eye. Denies dizziness, chest pain, SOB, RLE weakness, residual weakness from previous stroke. Patient has an appointment with a dermatologist on Friday. Patient went to her PMD Dr. Quigley and was been by the NP who told the Patient that spots were hereditary and was given an ointment with no improvement.

## 2021-04-20 NOTE — ED ADULT TRIAGE NOTE - CHIEF COMPLAINT QUOTE
as per emt, pt c/o itching x 1 week, pt told by dematologist and to be seen in ED if eye start itching. hx: cva x 2 with right and left side deficits.

## 2021-04-20 NOTE — ED PROVIDER NOTE - PATIENT PORTAL LINK FT
You can access the FollowMyHealth Patient Portal offered by [facility] by registering at the following website: [email or web address]. By joining [employer]’s FollowMyHealth portal, you will also be able to view your health information using other applications (apps) compatible with our system.

## 2021-04-20 NOTE — ED ADULT NURSE NOTE - OBJECTIVE STATEMENT
patient with itching over b/l eye lids but left more than right, slight swelling and discoloration noted. Patient has multiple generalized areas of dark wart like eruptions which she has been seeing a dermatologist for. She was advised to go to the ed if itching in and  around eye started.

## 2021-04-20 NOTE — ED PROVIDER NOTE - MUSCULOSKELETAL, MLM
Spine appears normal, range of motion is not limited, no muscle or joint tenderness. Increased tone in the LT upper and RT lower extremities.

## 2021-04-20 NOTE — ED PROVIDER NOTE - CLINICAL SUMMARY MEDICAL DECISION MAKING FREE TEXT BOX
87 y/o F with a PMHx of HTN, Osteoarthritis, Neuropathy, Fibromyalgia and CVA x2 (w/ residual b/l weakness) presents to the ED c/o spots noted to face, groin and b/l arms x1 week. Report itching of her b/l eye lids and double vision. Double vision may be due to new stroke will get labs cbc, cmp, trop, pt ptt, CTH CTA head and neck, ekg. Spot on face looks chronic pt has appt with dermatologist in 3 days will give benadryl and reassess

## 2021-04-20 NOTE — ED PROVIDER NOTE - NEUROLOGICAL, MLM
Alert and oriented, Extra-ocular movements intact. PERRL. Strength: 1/5 strength in LT upper and lower extremities. 3/5 strength in RLE. 5/5 strength in RUE. Cranial nerves: decreased sensation int RT V1, V2 and V3 compared to the LT. All other sensation grossly intact.

## 2021-04-20 NOTE — ED PROVIDER NOTE - EYES, MLM
Clear bilaterally, pupils equal, round and reactive to light. Double vision in b/l eyes. Clear bilaterally, pupils equal, round and reactive to light. +Double vision in b/l eyes.

## 2021-04-21 PROBLEM — I10 ESSENTIAL (PRIMARY) HYPERTENSION: Chronic | Status: ACTIVE | Noted: 2019-12-25

## 2021-04-21 PROBLEM — I63.9 CEREBRAL INFARCTION, UNSPECIFIED: Chronic | Status: ACTIVE | Noted: 2019-12-25

## 2021-06-24 NOTE — ED ADULT TRIAGE NOTE - BSA (M2)
1.93 Complex Repair And Graft Additional Text (Will Appearing After The Standard Complex Repair Text): The complex repair was not sufficient to completely close the primary defect. The remaining additional defect was repaired with the graft mentioned below.

## 2021-11-23 ENCOUNTER — INPATIENT (INPATIENT)
Facility: HOSPITAL | Age: 86
LOS: 6 days | Discharge: NURSING, REHAB OR LONG-TERM CARE | End: 2021-11-30
Attending: INTERNAL MEDICINE | Admitting: INTERNAL MEDICINE
Payer: MEDICARE

## 2021-11-23 VITALS
HEIGHT: 66.5 IN | WEIGHT: 179.9 LBS | SYSTOLIC BLOOD PRESSURE: 180 MMHG | DIASTOLIC BLOOD PRESSURE: 108 MMHG | OXYGEN SATURATION: 98 % | RESPIRATION RATE: 17 BRPM | TEMPERATURE: 98 F | HEART RATE: 67 BPM

## 2021-11-23 DIAGNOSIS — I10 ESSENTIAL (PRIMARY) HYPERTENSION: ICD-10-CM

## 2021-11-23 DIAGNOSIS — E78.5 HYPERLIPIDEMIA, UNSPECIFIED: ICD-10-CM

## 2021-11-23 DIAGNOSIS — M79.7 FIBROMYALGIA: ICD-10-CM

## 2021-11-23 DIAGNOSIS — I63.9 CEREBRAL INFARCTION, UNSPECIFIED: ICD-10-CM

## 2021-11-23 LAB
ALBUMIN SERPL ELPH-MCNC: 3.3 G/DL — SIGNIFICANT CHANGE UP (ref 3.3–5)
ALP SERPL-CCNC: 66 U/L — SIGNIFICANT CHANGE UP (ref 40–120)
ALT FLD-CCNC: 19 U/L — SIGNIFICANT CHANGE UP (ref 12–78)
ANION GAP SERPL CALC-SCNC: 9 MMOL/L — SIGNIFICANT CHANGE UP (ref 5–17)
APTT BLD: 28.7 SEC — SIGNIFICANT CHANGE UP (ref 27.5–35.5)
AST SERPL-CCNC: 18 U/L — SIGNIFICANT CHANGE UP (ref 15–37)
BASOPHILS # BLD AUTO: 0.02 K/UL — SIGNIFICANT CHANGE UP (ref 0–0.2)
BASOPHILS NFR BLD AUTO: 0.5 % — SIGNIFICANT CHANGE UP (ref 0–2)
BILIRUB SERPL-MCNC: 0.3 MG/DL — SIGNIFICANT CHANGE UP (ref 0.2–1.2)
BUN SERPL-MCNC: 10 MG/DL — SIGNIFICANT CHANGE UP (ref 7–23)
CALCIUM SERPL-MCNC: 8.9 MG/DL — SIGNIFICANT CHANGE UP (ref 8.5–10.1)
CHLORIDE SERPL-SCNC: 112 MMOL/L — HIGH (ref 96–108)
CO2 SERPL-SCNC: 23 MMOL/L — SIGNIFICANT CHANGE UP (ref 22–31)
CREAT SERPL-MCNC: 1.01 MG/DL — SIGNIFICANT CHANGE UP (ref 0.5–1.3)
EOSINOPHIL # BLD AUTO: 0.12 K/UL — SIGNIFICANT CHANGE UP (ref 0–0.5)
EOSINOPHIL NFR BLD AUTO: 2.9 % — SIGNIFICANT CHANGE UP (ref 0–6)
FLUAV AG NPH QL: SIGNIFICANT CHANGE UP
FLUBV AG NPH QL: SIGNIFICANT CHANGE UP
GLUCOSE BLDC GLUCOMTR-MCNC: 68 MG/DL — LOW (ref 70–99)
GLUCOSE SERPL-MCNC: 72 MG/DL — SIGNIFICANT CHANGE UP (ref 70–99)
HCT VFR BLD CALC: 38.3 % — SIGNIFICANT CHANGE UP (ref 34.5–45)
HGB BLD-MCNC: 12.4 G/DL — SIGNIFICANT CHANGE UP (ref 11.5–15.5)
IMM GRANULOCYTES NFR BLD AUTO: 0.7 % — SIGNIFICANT CHANGE UP (ref 0–1.5)
INR BLD: 1.07 RATIO — SIGNIFICANT CHANGE UP (ref 0.88–1.16)
LYMPHOCYTES # BLD AUTO: 1.65 K/UL — SIGNIFICANT CHANGE UP (ref 1–3.3)
LYMPHOCYTES # BLD AUTO: 39.2 % — SIGNIFICANT CHANGE UP (ref 13–44)
MCHC RBC-ENTMCNC: 27.7 PG — SIGNIFICANT CHANGE UP (ref 27–34)
MCHC RBC-ENTMCNC: 32.4 GM/DL — SIGNIFICANT CHANGE UP (ref 32–36)
MCV RBC AUTO: 85.5 FL — SIGNIFICANT CHANGE UP (ref 80–100)
MONOCYTES # BLD AUTO: 0.52 K/UL — SIGNIFICANT CHANGE UP (ref 0–0.9)
MONOCYTES NFR BLD AUTO: 12.4 % — SIGNIFICANT CHANGE UP (ref 2–14)
NEUTROPHILS # BLD AUTO: 1.87 K/UL — SIGNIFICANT CHANGE UP (ref 1.8–7.4)
NEUTROPHILS NFR BLD AUTO: 44.3 % — SIGNIFICANT CHANGE UP (ref 43–77)
NRBC # BLD: 0 /100 WBCS — SIGNIFICANT CHANGE UP (ref 0–0)
PLATELET # BLD AUTO: 266 K/UL — SIGNIFICANT CHANGE UP (ref 150–400)
POTASSIUM SERPL-MCNC: 3.7 MMOL/L — SIGNIFICANT CHANGE UP (ref 3.5–5.3)
POTASSIUM SERPL-SCNC: 3.7 MMOL/L — SIGNIFICANT CHANGE UP (ref 3.5–5.3)
PROT SERPL-MCNC: 7.6 GM/DL — SIGNIFICANT CHANGE UP (ref 6–8.3)
PROTHROM AB SERPL-ACNC: 12.4 SEC — SIGNIFICANT CHANGE UP (ref 10.6–13.6)
RBC # BLD: 4.48 M/UL — SIGNIFICANT CHANGE UP (ref 3.8–5.2)
RBC # FLD: 14.9 % — HIGH (ref 10.3–14.5)
SARS-COV-2 RNA SPEC QL NAA+PROBE: SIGNIFICANT CHANGE UP
SODIUM SERPL-SCNC: 144 MMOL/L — SIGNIFICANT CHANGE UP (ref 135–145)
TROPONIN I, HIGH SENSITIVITY RESULT: 25.3 NG/L — SIGNIFICANT CHANGE UP
WBC # BLD: 4.21 K/UL — SIGNIFICANT CHANGE UP (ref 3.8–10.5)
WBC # FLD AUTO: 4.21 K/UL — SIGNIFICANT CHANGE UP (ref 3.8–10.5)

## 2021-11-23 PROCEDURE — 99223 1ST HOSP IP/OBS HIGH 75: CPT

## 2021-11-23 PROCEDURE — 0042T: CPT

## 2021-11-23 PROCEDURE — 70498 CT ANGIOGRAPHY NECK: CPT | Mod: 26,MA

## 2021-11-23 PROCEDURE — 99291 CRITICAL CARE FIRST HOUR: CPT

## 2021-11-23 PROCEDURE — 70496 CT ANGIOGRAPHY HEAD: CPT | Mod: 26,MA

## 2021-11-23 RX ORDER — GABAPENTIN 400 MG/1
300 CAPSULE ORAL THREE TIMES A DAY
Refills: 0 | Status: DISCONTINUED | OUTPATIENT
Start: 2021-11-23 | End: 2021-11-30

## 2021-11-23 RX ORDER — CILOSTAZOL 100 MG/1
50 TABLET ORAL
Refills: 0 | Status: DISCONTINUED | OUTPATIENT
Start: 2021-11-23 | End: 2021-11-30

## 2021-11-23 RX ORDER — ACETAMINOPHEN 500 MG
650 TABLET ORAL EVERY 6 HOURS
Refills: 0 | Status: DISCONTINUED | OUTPATIENT
Start: 2021-11-23 | End: 2021-11-30

## 2021-11-23 RX ORDER — ENOXAPARIN SODIUM 100 MG/ML
40 INJECTION SUBCUTANEOUS DAILY
Refills: 0 | Status: DISCONTINUED | OUTPATIENT
Start: 2021-11-23 | End: 2021-11-30

## 2021-11-23 RX ORDER — LANOLIN ALCOHOL/MO/W.PET/CERES
3 CREAM (GRAM) TOPICAL AT BEDTIME
Refills: 0 | Status: DISCONTINUED | OUTPATIENT
Start: 2021-11-23 | End: 2021-11-30

## 2021-11-23 RX ORDER — GABAPENTIN 400 MG/1
1 CAPSULE ORAL
Qty: 0 | Refills: 0 | DISCHARGE

## 2021-11-23 RX ORDER — FUROSEMIDE 40 MG
1 TABLET ORAL
Qty: 0 | Refills: 0 | DISCHARGE

## 2021-11-23 RX ORDER — ONDANSETRON 8 MG/1
4 TABLET, FILM COATED ORAL EVERY 8 HOURS
Refills: 0 | Status: DISCONTINUED | OUTPATIENT
Start: 2021-11-23 | End: 2021-11-30

## 2021-11-23 RX ORDER — ATORVASTATIN CALCIUM 80 MG/1
80 TABLET, FILM COATED ORAL AT BEDTIME
Refills: 0 | Status: DISCONTINUED | OUTPATIENT
Start: 2021-11-23 | End: 2021-11-30

## 2021-11-23 RX ORDER — LIDOCAINE 4 G/100G
1 CREAM TOPICAL ONCE
Refills: 0 | Status: COMPLETED | OUTPATIENT
Start: 2021-11-23 | End: 2021-11-23

## 2021-11-23 RX ORDER — MECLIZINE HCL 12.5 MG
0 TABLET ORAL
Qty: 0 | Refills: 0 | DISCHARGE

## 2021-11-23 RX ORDER — ACETAMINOPHEN 500 MG
975 TABLET ORAL ONCE
Refills: 0 | Status: COMPLETED | OUTPATIENT
Start: 2021-11-23 | End: 2021-11-23

## 2021-11-23 RX ORDER — ASPIRIN/CALCIUM CARB/MAGNESIUM 324 MG
324 TABLET ORAL ONCE
Refills: 0 | Status: COMPLETED | OUTPATIENT
Start: 2021-11-23 | End: 2021-11-23

## 2021-11-23 RX ORDER — MORPHINE SULFATE 50 MG/1
2 CAPSULE, EXTENDED RELEASE ORAL ONCE
Refills: 0 | Status: DISCONTINUED | OUTPATIENT
Start: 2021-11-23 | End: 2021-11-23

## 2021-11-23 RX ORDER — OLMESARTAN MEDOXOMIL 5 MG/1
1 TABLET, FILM COATED ORAL
Qty: 0 | Refills: 0 | DISCHARGE

## 2021-11-23 RX ORDER — ATORVASTATIN CALCIUM 80 MG/1
1 TABLET, FILM COATED ORAL
Qty: 0 | Refills: 0 | DISCHARGE

## 2021-11-23 RX ORDER — POLYETHYLENE GLYCOL 3350 17 G/17G
0 POWDER, FOR SOLUTION ORAL
Qty: 0 | Refills: 0 | DISCHARGE

## 2021-11-23 RX ORDER — ASPIRIN/CALCIUM CARB/MAGNESIUM 324 MG
81 TABLET ORAL DAILY
Refills: 0 | Status: DISCONTINUED | OUTPATIENT
Start: 2021-11-23 | End: 2021-11-30

## 2021-11-23 RX ADMIN — Medication 975 MILLIGRAM(S): at 13:56

## 2021-11-23 RX ADMIN — MORPHINE SULFATE 2 MILLIGRAM(S): 50 CAPSULE, EXTENDED RELEASE ORAL at 22:47

## 2021-11-23 RX ADMIN — LIDOCAINE 1 PATCH: 4 CREAM TOPICAL at 13:56

## 2021-11-23 RX ADMIN — Medication 975 MILLIGRAM(S): at 14:55

## 2021-11-23 RX ADMIN — ATORVASTATIN CALCIUM 80 MILLIGRAM(S): 80 TABLET, FILM COATED ORAL at 21:32

## 2021-11-23 RX ADMIN — CILOSTAZOL 50 MILLIGRAM(S): 100 TABLET ORAL at 17:18

## 2021-11-23 RX ADMIN — Medication 324 MILLIGRAM(S): at 14:52

## 2021-11-23 RX ADMIN — ENOXAPARIN SODIUM 40 MILLIGRAM(S): 100 INJECTION SUBCUTANEOUS at 17:02

## 2021-11-23 RX ADMIN — GABAPENTIN 300 MILLIGRAM(S): 400 CAPSULE ORAL at 21:32

## 2021-11-23 RX ADMIN — MORPHINE SULFATE 2 MILLIGRAM(S): 50 CAPSULE, EXTENDED RELEASE ORAL at 23:57

## 2021-11-23 RX ADMIN — LIDOCAINE 1 PATCH: 4 CREAM TOPICAL at 19:27

## 2021-11-23 NOTE — H&P ADULT - NSHPPHYSICALEXAM_GEN_ALL_CORE
CONSTITUTIONAL: Well developed, well nourished, alert and cooperative, no acute distress. BP-161/78 , HR- 62, RR- 18  EYES: PERRL, EOMI, no scleral icterus  ENT: Mucosa moist, tongue normal.   NECK: Neck supple, trachea midline, non-tender, no masses or thyromegaly.  CARDIAC: Normal S1 and S2. Regular rate and rhythms. No murmurs. No Pedal edema. Peripheral pulses intact  LUNGS: Clear to auscultation, equal air entry both lungs. No rales, rhonchi, wheezing. Normal respiratory effort.   ABDOMEN: Soft, nondistended, nontender. No guarding or rebound tenderness. No hepatomegaly or splenomegaly.   MUSCULOSKELETAL: Normocephalic, atraumatic. Spine normal without deformity or tenderness.   NEUROLOGICAL: No sensory deficits. CN II-XII grossly intact. Motor RLE 4+/5, LLE 5/5, RUE 5/5  SKIN: no lesions or eruptions. Normal turgor  PSYCHIATRIC: A&O x 3, appropriate mood and affect

## 2021-11-23 NOTE — H&P ADULT - HISTORY OF PRESENT ILLNESS
89 years old female with h/o HTN, HLD, OA, fibromyalgia, h/o CVA ( 2016 and 2020), s/p bilateral hip replacement present to ED with complain of slurry speech since waking today AM. Last known normalwas 12:30AM going to sleep. Patient has mild right left weakness, which she said it's at baseline. No vision/hearing change. Normally ambulate with rolling walker.   Hypertensive in ED, sat well at RA. EKG with NSR, 1st degree AV block, QTc 429. No leukocytosis, Hb 12.4, Plt 266, K 3.7, Cr 1.01, hstnt 25.3. CT head with remote lacunar infarct, new ischemia cannot be excluded. CT perfusion abnormal. ED consulted neurology. Patient was given aspirin 324mg.     SH: no toxic habits

## 2021-11-23 NOTE — ED PROVIDER NOTE - PHYSICAL EXAMINATION
Physical Exam:  Gen: NAD, AOx3, non-toxic appearing  Head: NCAT  HEENT: EOMI, PEERLA, normal conjunctiva, tongue midline, oral mucosa moist  Lung: CTAB, no respiratory distress, no wheezes/rhonchi/rales B/L, speaking in full sentences  CV: RRR, no murmurs, rubs or gallops, distal pulses 2+ b/l  Abd: soft, NT, ND, no guarding, no rigidity, no rebound tenderness, no CVA tenderness   MSK: no visible deformities, ROM normal in UE/LE except hip flexion in RLE slightly limited 2/2 pain  Neuro: 5/5 strength b/l except limited exam in RLE due to hip pain, unwilling to participate in  strength due to "carpal tunnel pain", mild dysarthria, CN II-XII intact, No focal sensory or motor deficits  Skin: Warm, well perfused, no rash  Psych: normal affect, calm

## 2021-11-23 NOTE — ED PROVIDER NOTE - INTERPRETATION
normal sinus rhythm S Plasty Text: Given the location and shape of the defect, and the orientation of relaxed skin tension lines, an S-plasty was deemed most appropriate for repair.  Using a sterile surgical marker, the appropriate outline of the S-plasty was drawn, incorporating the defect and placing the expected incisions within the relaxed skin tension lines where possible.  The area thus outlined was incised deep to adipose tissue with a #15 scalpel blade.  The skin margins were undermined to an appropriate distance in all directions utilizing iris scissors. The skin flaps were advanced over the defect.  The opposing margins were then approximated with interrupted buried subcutaneous sutures.

## 2021-11-23 NOTE — ED ADULT NURSE NOTE - NSICDXPASTMEDICALHX_GEN_ALL_CORE_FT
PAST MEDICAL HISTORY:  CVA (cerebral vascular accident) left side weakness    Fibromyalgia     HTN (hypertension)     Hypertension     Neuropathy     Osteoarthritis

## 2021-11-23 NOTE — ED ADULT TRIAGE NOTE - CHIEF COMPLAINT QUOTE
pt a&O x4 from home biba onset slurred speech last night. hx stroke last year with residual left leg weakness. mild dysarthria present . no facial droop, no arm drift no other neuro deficets.  code stroke called lio spears in triage. pt a&O x4 from home biba onset slurred speech last night. hx stroke last year with residual left leg weakness. mild dysarthria present . no facial droop, no arm drift no other neuro deficits.  code stroke called by md in triage.

## 2021-11-23 NOTE — H&P ADULT - NSHPLABSRESULTS_GEN_ALL_CORE
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    CT PERFUSION demonstrated: INFARCT CORE: CBF < 30%: 0 ml  TISSUE AT RISK: Tmax > 6s: 12 ml left frontotemporal lobes  MISMATCH VOLUME: 12 ml  MISMATCH RATIO: infinite. If symptoms persist consider follow up head CT or MRI, MRA  if no contraindication.    CTA COW: Atherosclerotic vascular calcification multifocal stenosis cavernous, supraclinoid ICAs, patent bifurcation segments, patent proximal M1, with filling defects, stenosis and irregularity along the left M2 MCA frontotemporal branches, with stenosis along the distal bilateral MCA CARINA MPC branches.    CTA NECK: Tortuous, patent, CCAs, ECAs, ICAs, no  hemodynamically significant stenosis at  ICA origins by NASCET criteria.  Bilateral vertebral arteries are patent without flow limiting stenosis.

## 2021-11-23 NOTE — ED ADULT NURSE NOTE - OBJECTIVE STATEMENT
Pt present w/ slurred speech, Last seen normal 00:30, pt unable to call for help. Stroke 1 yr prior. Pt present w/ slurred speech, Last seen normal 00:30, pt unable to call for help. Bi lat hand grasp weakness, L leg residual weakness from prior stroke. Stroke 1 yr prior.

## 2021-11-23 NOTE — ED PROVIDER NOTE - PROGRESS NOTE DETAILS
[facility] PGY3: Dr. Goldberg neuro consulted, admitted to medicine. Patient states she is not allergic to aspirin and takes aspirin daily for "heart problems".

## 2021-11-23 NOTE — H&P ADULT - NSHPREVIEWOFSYSTEMS_GEN_ALL_CORE
CONSTITUTIONAL: No fever, chills or weight loss.   EYES: No eye pain. No vision changes  ENT:  No hearing changes or pain, No nasal congestion.  Cardiovascular:  No Chest Pain, palpitation, SOB   Respiratory:  No cough, SOB or wheezing.  Gastrointestinal:  No nausea, vomiting or abdominal pain. No blood in stool.  Genitourinary: No dysuria, frequency or hematuria  Musculoskeletal:  fibromyalgia, OA pain   Skin:  No skin lesion, rash or pruritis  Neuro:  Slurry speech, h/o CVA x 2  Psych:  No hallucination. No recent changes in mood  Heme/Lymph:  No easy bruising or bleeding.   Endocrine:  No heat or cold intolerance

## 2021-11-23 NOTE — ED ADULT NURSE NOTE - CHIEF COMPLAINT QUOTE
pt a&O x4 from home biba onset slurred speech last night. hx stroke last year with residual left leg weakness. mild dysarthria present . no facial droop, no arm drift no other neuro deficets.  code stroke called lio spears in triage.

## 2021-11-23 NOTE — H&P ADULT - PROBLEM SELECTOR PLAN 1
present with slurry speech, out of window for TPA  h/o CVA x 2, 2016 and 2020 with mild residual weakness, ambulate with rolling walker normally  CT head with remote lacunar infarct. New ischemia cannot be excluded. CT perfusion abnormal.  Continue aspirin and high intensity statin  ECHO, MRI brain, telemetry monitoring  Permissive HTN  Neuro check  Lipid panel, A1c  PT/OT/Speech evaluation  ED consulted neurology

## 2021-11-23 NOTE — ED PROVIDER NOTE - ATTENDING CONTRIBUTION TO CARE
I saw and evaluated Ms Isatu Barbour on 11/23/21. The case was signed out to Dr. Matthews at 1300. I personally reviewed th HPI, PH, FH, SH, ROS and medications. I repeated pertinent portions of the examination and reviewed the relevant imaging and laboratory data. I agree with the findings, assessment and plan as documented.    Due to a high probability of clinically significant, life threatening deterioration, the patient required my highest level of preparedness to intervene emergently and I personally spent this critical care time directly and personally managing the patient. This critical care time included obtaining a history; examining the patient; pulse oximetry; ordering and review of studies; arranging urgent treatment with development of a management plan; evaluation of patient's response to treatment; frequent reassessment; and discussions with other providers, including the tele-stroke service.     This critical care time was performed to assess and manage the high probability of imminent, life-threatening deterioration that could result in multi-organ failure. It was exclusive separately billable procedures and treating other patients and teaching time.     Please see MDM section and rest of the note for further information on patient assessment and treatment.

## 2021-11-23 NOTE — H&P ADULT - ASSESSMENT
89 years old female with h/o HTN, HLD, OA, fibromyalgia, h/o CVA ( 2016 and 2020), s/p bilateral hip replacement present to ED with complain of slurry speech since waking today AM. Last known normalwas 12:30AM going to sleep.  Hypertensive in ED, sat well at RA. EKG with NSR, 1st degree AV block, QTc 429. No leukocytosis, Hb 12.4, Plt 266, K 3.7, Cr 1.01, hstnt 25.3. CT head with remote lacunar infarct, new ischemia cannot be excluded. CT perfusion abnormal. ED consulted neurology. Patient was given aspirin 324mg.    Admitted with CVA

## 2021-11-23 NOTE — ED ADULT NURSE NOTE - NSSEPSISNEWALTERMENTAL_ED_A_ED
Reason For Visit  Reason For Visit:   Patient presents for follow-up .     Chaperone: PRATIMA UREÑA is unaccompanied.   :  services not used.        Progress Note  Progress Note:   Session Type: Individual   Date: 5/17/17   Start Time: 3:30, pm   End Time: 4:20, pm   Mood: Anxious and Depressed.   Recent Behavior: Oppositional.   Symptom Change: Increased.   Diagnoses: Adjustment Disorder - mixed features, currently anxious   Mental Status: No Problem Indicated   Risk of Harm: None   Suicide/Homicide: None   Self-Injury: None   Abuse (Phys,Sex,Emot): None   Type of Treatment: Insight-oriented, Interpersonal and Psycho Education Therapy.   Session Summary: Patient reports a difficult week and a return of feelings of anger and hurt. Her dad and step mom cancelled their promised trip to come see her this weekend. This is the last chance they will have to see each other. Gaye is now at least 2 months out on following her to [place] and this is a big disappointment. There was an issue with her ex who made some inappropriate comments to her that angered her. All around her she feels let down by people. She is too concerned about doing things for others. She is anxious about moving and now doing it alone. Sleep has been poor this past week. Supported and encouraged patient.   Plan: Continue with weekly counseling at this time.      Signatures   Electronically signed by : NAYA SOUZACAVIOLA,[facility],; May 17 2017  6:18PM CST (Author)     No

## 2021-11-23 NOTE — ED PROVIDER NOTE - OBJECTIVE STATEMENT
88yo female HTN, OA, b/l hip replacements, fibromyalgia, CVA with mild LUE weakness, ambulates with rolling walker p/w dysarthria since waking up this morning. LKN 1230am. Notes b/l "carpal tunnel pain", R shoulder pain, RUE pain, R hip pain that was worse this AM, feels likely arthritis and fibromyalgia pain that intermittently flares. Takes tylenol for pain management. Lives alone, independent. Denies change in vision, falls, head trauma, new weakness/numbness. Patient feels dysarthria is improving. Code stroke called upon arrival.

## 2021-11-23 NOTE — ED PROVIDER NOTE - CLINICAL SUMMARY MEDICAL DECISION MAKING FREE TEXT BOX
88yo female p/w improving dysarthria, out of window for tpa, no other neuro deficits. Also complaining of chronic OA/fibromyaglia pain, will treat. No history of trauma, doubt acute fx. Eval for CVA. F/u CT's, labs and likely admit for neuro eval/MRI. 88yo female p/w improving dysarthria, out of window for tpa, no other neuro deficits. NIH score of 1. Also complaining of chronic OA/fibromyaglia pain, will treat. No history of trauma, doubt acute fx. Eval for CVA. F/u CT's, labs and likely admit for neuro eval/MRI.

## 2021-11-24 LAB
A1C WITH ESTIMATED AVERAGE GLUCOSE RESULT: 6.2 % — HIGH (ref 4–5.6)
ALBUMIN SERPL ELPH-MCNC: 2.8 G/DL — LOW (ref 3.3–5)
ALP SERPL-CCNC: 57 U/L — SIGNIFICANT CHANGE UP (ref 40–120)
ALT FLD-CCNC: 14 U/L — SIGNIFICANT CHANGE UP (ref 12–78)
ANION GAP SERPL CALC-SCNC: 5 MMOL/L — SIGNIFICANT CHANGE UP (ref 5–17)
AST SERPL-CCNC: 13 U/L — LOW (ref 15–37)
BILIRUB SERPL-MCNC: 0.3 MG/DL — SIGNIFICANT CHANGE UP (ref 0.2–1.2)
BUN SERPL-MCNC: 14 MG/DL — SIGNIFICANT CHANGE UP (ref 7–23)
CALCIUM SERPL-MCNC: 8.5 MG/DL — SIGNIFICANT CHANGE UP (ref 8.5–10.1)
CHLORIDE SERPL-SCNC: 113 MMOL/L — HIGH (ref 96–108)
CHOLEST SERPL-MCNC: 173 MG/DL — SIGNIFICANT CHANGE UP
CO2 SERPL-SCNC: 27 MMOL/L — SIGNIFICANT CHANGE UP (ref 22–31)
COVID-19 NUCLEOCAPSID GAM AB INTERP: NEGATIVE — SIGNIFICANT CHANGE UP
COVID-19 NUCLEOCAPSID TOTAL GAM ANTIBODY RESULT: 0.08 INDEX — SIGNIFICANT CHANGE UP
COVID-19 SPIKE DOMAIN AB INTERP: POSITIVE
COVID-19 SPIKE DOMAIN ANTIBODY RESULT: >250 U/ML — HIGH
CREAT SERPL-MCNC: 1.03 MG/DL — SIGNIFICANT CHANGE UP (ref 0.5–1.3)
ESTIMATED AVERAGE GLUCOSE: 131 MG/DL — HIGH (ref 68–114)
GLUCOSE SERPL-MCNC: 83 MG/DL — SIGNIFICANT CHANGE UP (ref 70–99)
HCT VFR BLD CALC: 36.9 % — SIGNIFICANT CHANGE UP (ref 34.5–45)
HDLC SERPL-MCNC: 60 MG/DL — SIGNIFICANT CHANGE UP
HGB BLD-MCNC: 11.7 G/DL — SIGNIFICANT CHANGE UP (ref 11.5–15.5)
LIPID PNL WITH DIRECT LDL SERPL: 98 MG/DL — SIGNIFICANT CHANGE UP
MAGNESIUM SERPL-MCNC: 2.6 MG/DL — SIGNIFICANT CHANGE UP (ref 1.6–2.6)
MCHC RBC-ENTMCNC: 27.3 PG — SIGNIFICANT CHANGE UP (ref 27–34)
MCHC RBC-ENTMCNC: 31.7 GM/DL — LOW (ref 32–36)
MCV RBC AUTO: 86 FL — SIGNIFICANT CHANGE UP (ref 80–100)
NON HDL CHOLESTEROL: 113 MG/DL — SIGNIFICANT CHANGE UP
NRBC # BLD: 0 /100 WBCS — SIGNIFICANT CHANGE UP (ref 0–0)
PHOSPHATE SERPL-MCNC: 3.6 MG/DL — SIGNIFICANT CHANGE UP (ref 2.5–4.5)
PLATELET # BLD AUTO: 193 K/UL — SIGNIFICANT CHANGE UP (ref 150–400)
POTASSIUM SERPL-MCNC: 3.9 MMOL/L — SIGNIFICANT CHANGE UP (ref 3.5–5.3)
POTASSIUM SERPL-SCNC: 3.9 MMOL/L — SIGNIFICANT CHANGE UP (ref 3.5–5.3)
PROT SERPL-MCNC: 6.9 GM/DL — SIGNIFICANT CHANGE UP (ref 6–8.3)
RBC # BLD: 4.29 M/UL — SIGNIFICANT CHANGE UP (ref 3.8–5.2)
RBC # FLD: 15.1 % — HIGH (ref 10.3–14.5)
SARS-COV-2 IGG+IGM SERPL QL IA: 0.08 INDEX — SIGNIFICANT CHANGE UP
SARS-COV-2 IGG+IGM SERPL QL IA: >250 U/ML — HIGH
SARS-COV-2 IGG+IGM SERPL QL IA: NEGATIVE — SIGNIFICANT CHANGE UP
SARS-COV-2 IGG+IGM SERPL QL IA: POSITIVE
SODIUM SERPL-SCNC: 145 MMOL/L — SIGNIFICANT CHANGE UP (ref 135–145)
TRIGL SERPL-MCNC: 74 MG/DL — SIGNIFICANT CHANGE UP
TROPONIN I, HIGH SENSITIVITY RESULT: 24.2 NG/L — SIGNIFICANT CHANGE UP
WBC # BLD: 4.17 K/UL — SIGNIFICANT CHANGE UP (ref 3.8–10.5)
WBC # FLD AUTO: 4.17 K/UL — SIGNIFICANT CHANGE UP (ref 3.8–10.5)

## 2021-11-24 PROCEDURE — 99222 1ST HOSP IP/OBS MODERATE 55: CPT

## 2021-11-24 PROCEDURE — 99233 SBSQ HOSP IP/OBS HIGH 50: CPT

## 2021-11-24 PROCEDURE — 93010 ELECTROCARDIOGRAM REPORT: CPT

## 2021-11-24 PROCEDURE — 73030 X-RAY EXAM OF SHOULDER: CPT | Mod: 26,RT

## 2021-11-24 PROCEDURE — 93306 TTE W/DOPPLER COMPLETE: CPT | Mod: 26

## 2021-11-24 PROCEDURE — 99223 1ST HOSP IP/OBS HIGH 75: CPT

## 2021-11-24 PROCEDURE — 70551 MRI BRAIN STEM W/O DYE: CPT | Mod: 26

## 2021-11-24 RX ORDER — OXYCODONE AND ACETAMINOPHEN 5; 325 MG/1; MG/1
1 TABLET ORAL ONCE
Refills: 0 | Status: DISCONTINUED | OUTPATIENT
Start: 2021-11-24 | End: 2021-11-24

## 2021-11-24 RX ORDER — LIDOCAINE 4 G/100G
1 CREAM TOPICAL DAILY
Refills: 0 | Status: DISCONTINUED | OUTPATIENT
Start: 2021-11-24 | End: 2021-11-30

## 2021-11-24 RX ORDER — CLOPIDOGREL BISULFATE 75 MG/1
75 TABLET, FILM COATED ORAL DAILY
Refills: 0 | Status: DISCONTINUED | OUTPATIENT
Start: 2021-11-24 | End: 2021-11-25

## 2021-11-24 RX ADMIN — LIDOCAINE 1 PATCH: 4 CREAM TOPICAL at 01:26

## 2021-11-24 RX ADMIN — Medication 81 MILLIGRAM(S): at 13:03

## 2021-11-24 RX ADMIN — GABAPENTIN 300 MILLIGRAM(S): 400 CAPSULE ORAL at 21:37

## 2021-11-24 RX ADMIN — Medication 650 MILLIGRAM(S): at 10:15

## 2021-11-24 RX ADMIN — LIDOCAINE 1 PATCH: 4 CREAM TOPICAL at 21:39

## 2021-11-24 RX ADMIN — ENOXAPARIN SODIUM 40 MILLIGRAM(S): 100 INJECTION SUBCUTANEOUS at 13:04

## 2021-11-24 RX ADMIN — LIDOCAINE 1 PATCH: 4 CREAM TOPICAL at 13:04

## 2021-11-24 RX ADMIN — OXYCODONE AND ACETAMINOPHEN 1 TABLET(S): 5; 325 TABLET ORAL at 22:48

## 2021-11-24 RX ADMIN — ATORVASTATIN CALCIUM 80 MILLIGRAM(S): 80 TABLET, FILM COATED ORAL at 21:37

## 2021-11-24 RX ADMIN — OXYCODONE AND ACETAMINOPHEN 1 TABLET(S): 5; 325 TABLET ORAL at 22:16

## 2021-11-24 RX ADMIN — CILOSTAZOL 50 MILLIGRAM(S): 100 TABLET ORAL at 17:12

## 2021-11-24 RX ADMIN — CLOPIDOGREL BISULFATE 75 MILLIGRAM(S): 75 TABLET, FILM COATED ORAL at 13:03

## 2021-11-24 RX ADMIN — CILOSTAZOL 50 MILLIGRAM(S): 100 TABLET ORAL at 05:41

## 2021-11-24 RX ADMIN — Medication 650 MILLIGRAM(S): at 09:26

## 2021-11-24 RX ADMIN — GABAPENTIN 300 MILLIGRAM(S): 400 CAPSULE ORAL at 05:41

## 2021-11-24 RX ADMIN — GABAPENTIN 300 MILLIGRAM(S): 400 CAPSULE ORAL at 13:03

## 2021-11-24 NOTE — CONSULT NOTE ADULT - ASSESSMENT
89F with HTN, hx of multiple prev CVAs p/w slurred speech, found to have an acute cva.     1. CVA  -cont care per neurology  -tte with bubble study pending  -would try to obtain outpt records from her cardiologist  -d/w RE: cardiac eval, pt not interested in LYDIA. pt not interested in any invasive procedures to her heart including percutaneous procedures  -pt not interested in a ILR.  -can cont to monitor on tele, currently in SR  -cont asa, plavix, statin  -cont supportive care per primary team

## 2021-11-24 NOTE — OCCUPATIONAL THERAPY INITIAL EVALUATION ADULT - RANGE OF MOTION EXAMINATION, UPPER EXTREMITY
ROM is limited in both shoulders, elbow and hand by greater than 40 &/bilateral UE Passive ROM was WFL  (within functional limits)/bilateral UE Active Assistive ROM was WFL  (within functional limits)

## 2021-11-24 NOTE — OCCUPATIONAL THERAPY INITIAL EVALUATION ADULT - LIVES WITH, PROFILE
Pt lives alone in a private house with 4 entry steps equipped with bilateral hand rails that cannot be reached simultaneously . Once inside, pt has to negotiate a flight of stairs , with a single handrail to access the bedroom and bathroom. The bathroom has a tub/shower combination, fixed / retractable shower head, shower bench  and standard toilet.

## 2021-11-24 NOTE — OCCUPATIONAL THERAPY INITIAL EVALUATION ADULT - PERTINENT HX OF CURRENT PROBLEM, REHAB EVAL
Pt is an 89 y/o female who presented to ER due to acute onset of neurological deficits. Pt is diagnosed with Dysarthria . MR was slated for today.

## 2021-11-24 NOTE — CONSULT NOTE ADULT - SUBJECTIVE AND OBJECTIVE BOX
LAURA CASTANEDA  [id number]    Date of Consult:  11/24/21  CHIEF COMPLAINT:  cva  HISTORY OF PRESENT ILLNESS:  89F with HTN, hx of multiple prev CVAs p/w slurred speech, found to have an acute cva. per pt she has been worked up previously, she follows with a cardiologist regularly. pt denies hx of cardiac dz, denies pfo, denies hx of afib, but does endorse "having an extra heart beat" currently, pt feeling well, states her speech has improved since admission.  since waking today AM.of note, patient has mild right left weakness, which she said it's at baseline 2/2 fibromyalgia.   Allergies    No Known Allergies    Intolerances    	    MEDICATIONS:  aspirin enteric coated 81 milliGRAM(s) Oral daily  cilostazol 50 milliGRAM(s) Oral two times a day  clopidogrel Tablet 75 milliGRAM(s) Oral daily  enoxaparin Injectable 40 milliGRAM(s) SubCutaneous daily        acetaminophen     Tablet .. 650 milliGRAM(s) Oral every 6 hours PRN  gabapentin 300 milliGRAM(s) Oral three times a day  melatonin 3 milliGRAM(s) Oral at bedtime PRN  ondansetron Injectable 4 milliGRAM(s) IV Push every 8 hours PRN      atorvastatin 80 milliGRAM(s) Oral at bedtime    lidocaine   4% Patch 1 Patch Transdermal daily      PAST MEDICAL & SURGICAL HISTORY:  Hypertension    Fibromyalgia    Osteoarthritis    Neuropathy    HTN (hypertension)    CVA (cerebral vascular accident)  left side weakness        FAMILY HISTORY:  No pertinent family history in first degree relatives        SOCIAL HISTORY:    denies tob etoh or drugs.       REVIEW OF SYSTEMS:  See HPI. Otherwise, 10 point ROS done and otherwise negative.    PHYSICAL EXAM:  T(C): 36.3 (11-24-21 @ 04:55), Max: 36.6 (11-23-21 @ 18:35)  HR: 57 (11-24-21 @ 04:55) (57 - 86)  BP: 130/70 (11-24-21 @ 04:55) (130/70 - 164/70)  RR: 20 (11-24-21 @ 04:55) (17 - 21)  SpO2: 96% (11-24-21 @ 04:55) (95% - 97%)  Wt(kg): --  I&O's Summary    24 Nov 2021 07:01  -  24 Nov 2021 14:56  --------------------------------------------------------  IN: 177 mL / OUT: 0 mL / NET: 177 mL        Appearance: Normal	  HEENT:   Normal oral mucosa, PERRL, EOMI	  Lymphatic: No lymphadenopathy  Cardiovascular: Normal S1 S2, No JVD, No murmurs, No edema  Respiratory: Lungs clear to auscultation	  Psychiatry: A & O x 3, Mood & affect appropriate  Gastrointestinal:  Soft, Non-tender, + BS	  Skin: No rashes, No ecchymoses, No cyanosis	  Neurologic: RUE weakness  Extremities: Normal range of motion, No clubbing, cyanosis       LABS:	 	    CBC Full  -  ( 24 Nov 2021 07:34 )  WBC Count : 4.17 K/uL  Hemoglobin : 11.7 g/dL  Hematocrit : 36.9 %  Platelet Count - Automated : 193 K/uL  Mean Cell Volume : 86.0 fl  Mean Cell Hemoglobin : 27.3 pg  Mean Cell Hemoglobin Concentration : 31.7 gm/dL  Auto Neutrophil # : x  Auto Lymphocyte # : x  Auto Monocyte # : x  Auto Eosinophil # : x  Auto Basophil # : x  Auto Neutrophil % : x  Auto Lymphocyte % : x  Auto Monocyte % : x  Auto Eosinophil % : x  Auto Basophil % : x    11-24    145  |  113<H>  |  14  ----------------------------<  83  3.9   |  27  |  1.03  11-23    144  |  112<H>  |  10  ----------------------------<  72  3.7   |  23  |  1.01    Ca    8.5      24 Nov 2021 07:34  Ca    8.9      23 Nov 2021 13:09  Phos  3.6     11-24  Mg     2.6     11-24    TPro  6.9  /  Alb  2.8<L>  /  TBili  0.3  /  DBili  x   /  AST  13<L>  /  ALT  14  /  AlkPhos  57  11-24  TPro  7.6  /  Alb  3.3  /  TBili  0.3  /  DBili  x   /  AST  18  /  ALT  19  /  AlkPhos  66  11-23      proBNP:   Lipid Profile:   HgA1c:   TSH:       CARDIAC MARKERS:            TELEMETRY: 	    ECG:  	  RADIOLOGY:  OTHER: 	    PREVIOUS DIAGNOSTIC TESTING:    [ ] Echocardiogram:  [ ]  Catheterization:  [ ] Stress Test:  	  	  ASSESSMENT/PLAN: 	    Rupert Mcduffie MD    
NEUROLOGY CONSULT    HPI:  88 yo woman admitted after acute onset of dysarthria.  Stroke risk factors include age, HTN, previous history of stroke with mild residual left hemiparesis).  MRI brain shows small acute ischemic infarct in posterior limb of right IC.  CTA head/neck with no LVO.  EKG in NSR, 1st degree HB    Patient feels that her speech is improving since admission.    NEUROLOGICAL EXAM:  T 97.3 F  /70 HR 57  MS: Awake, alert, oriented x 4, language fluent, comprehension intact, naming intact, repetition intact, normal affect  CN: PERRLA, EOMI, visual fields intact, facial sensation, intact, face symmetric, hearing intact, +dysarthria, tongue midline, symmetric shoulder shrug and SCM strength  Motor: normal bulk, generalized weakness, weakness in lower extremities at baseline due to b/l hip replacement/OA using walker at baseline, no motor asymmetry   Sensation: intact to light touch, vibration  Reflexes: 3+ at biceps, brachioradialis, patella, ankle bilaterally.  Flexor plantar response bilaterally.  No clonus  Coordination: no dysmetria    NIHSS: 5    Assessment:  88 yo woman with an acute ischemic lacunar infarct, with improving dysarthria.      Recommendations:  1. Recommend Aspirin 81mg daily monotherapy (NIHSS > 3)  2. Optimize control of HTN  3. Intensive statin therapy, Atorvastatin 40-80mg daily, target LDL < 70  4. Speech therapy  5. TTE  6. Stroke education    Richard Goldberg MD  [facility] Department of Neurology  [facility]

## 2021-11-24 NOTE — OCCUPATIONAL THERAPY INITIAL EVALUATION ADULT - BALANCE TRAINING, PT EVAL
Pt will increased sitting balance from fair to good in 12 weeks to prevent falls, optimize pt's ability for ADL management & safely navigate in all terrains

## 2021-11-24 NOTE — OCCUPATIONAL THERAPY INITIAL EVALUATION ADULT - PAIN SENSATION, LUE, REHAB EVAL
Received report from ED RN. No insulin or fluids running, patient on room air with stable vitals. Complains of 8/10 pain in sides and back. Transported on monitor from [place] 20 to [phone]   within normal limits

## 2021-11-24 NOTE — OCCUPATIONAL THERAPY INITIAL EVALUATION ADULT - ADDITIONAL COMMENTS
Prior to admission, pt was functioning in her roles, self sufficient & ambulating independently with rollator. Pt stated she has difficulty with ADL management due to old CVA and fibromyalgia/ severe pain in right shoulder. Pt is unable to make composite fists bilateral due to contractures and arthritic changes in both hands. Presently, pt needs total assistance with  self care tasks due to pain, weakness, stiffness and decreased ROM . Pt is right hand dominant and wears glasses for reading.

## 2021-11-24 NOTE — OCCUPATIONAL THERAPY INITIAL EVALUATION ADULT - IMPAIRMENTS CONTRIBUTING IMPAIRED BED MOBILITY, REHAB EVAL
impaired balance/impaired coordination/decreased flexibility/abnormal muscle tone/pain/decreased ROM/decreased strength

## 2021-11-24 NOTE — OCCUPATIONAL THERAPY INITIAL EVALUATION ADULT - PLANNED THERAPY INTERVENTIONS, OT EVAL
energy conservation techniques/ADL retraining/IADL retraining/balance training/bed mobility training/fine motor coordination training/motor coordination training/neuromuscular re-education/parent/caregiver training.../ROM/transfer training

## 2021-11-24 NOTE — OCCUPATIONAL THERAPY INITIAL EVALUATION ADULT - RANGE OF MOTION, PT EVAL
Pt will increase ROM in both shoulder, wrists and hands by 0 degrees to safely and independently perform self care tasks within  12 weeks

## 2021-11-24 NOTE — PROGRESS NOTE ADULT - SUBJECTIVE AND OBJECTIVE BOX
INTERVAL HPI/OVERNIGHT EVENTS: Pt seen and examined. No evets overnight.    89y  Vital Signs Last 24 Hrs  T(C): 36.3 (24 Nov 2021 04:55), Max: 36.6 (23 Nov 2021 12:17)  T(F): 97.3 (24 Nov 2021 04:55), Max: 97.8 (23 Nov 2021 12:17)  HR: 57 (24 Nov 2021 04:55) (57 - 86)  BP: 130/70 (24 Nov 2021 04:55) (130/70 - 180/108)  BP(mean): --  RR: 20 (24 Nov 2021 04:55) (17 - 21)  SpO2: 96% (24 Nov 2021 04:55) (95% - 98%)  I&O's Summary    24 Nov 2021 07:01  -  24 Nov 2021 11:51  --------------------------------------------------------  IN: 177 mL / OUT: 0 mL / NET: 177 mL      MEDICATIONS  (STANDING):  aspirin enteric coated 81 milliGRAM(s) Oral daily  atorvastatin 80 milliGRAM(s) Oral at bedtime  cilostazol 50 milliGRAM(s) Oral two times a day  enoxaparin Injectable 40 milliGRAM(s) SubCutaneous daily  gabapentin 300 milliGRAM(s) Oral three times a day  lidocaine   4% Patch 1 Patch Transdermal daily    MEDICATIONS  (PRN):  acetaminophen     Tablet .. 650 milliGRAM(s) Oral every 6 hours PRN Temp greater or equal to 38C (100.4F), Mild Pain (1 - 3), Moderate Pain (4 - 6)  melatonin 3 milliGRAM(s) Oral at bedtime PRN Insomnia  ondansetron Injectable 4 milliGRAM(s) IV Push every 8 hours PRN Nausea and/or Vomiting    LABS:    trop                        11.7   4.17  )-----------( 193      ( 24 Nov 2021 07:34 )             36.9     11-24    145  |  113<H>  |  14  ----------------------------<  83  3.9   |  27  |  1.03    Ca    8.5      24 Nov 2021 07:34  Phos  3.6     11-24  Mg     2.6     11-24    TPro  6.9  /  Alb  2.8<L>  /  TBili  0.3  /  DBili  x   /  AST  13<L>  /  ALT  14  /  AlkPhos  57  11-24    PT/INR - ( 23 Nov 2021 13:09 )   PT: 12.4 sec;   INR: 1.07 ratio         PTT - ( 23 Nov 2021 13:09 )  PTT:28.7 sec    CAPILLARY BLOOD GLUCOSE      POCT Blood Glucose.: 68 mg/dL (23 Nov 2021 12:57)          REVIEW OF SYSTEMS:  CONSTITUTIONAL: No fever, weight loss, or fatigue  RESPIRATORY: No cough, wheezing, chills or hemoptysis; No shortness of breath  CARDIOVASCULAR: No chest pain, palpitations, dizziness, or leg swelling  GASTROINTESTINAL: No abdominal or epigastric pain. No nausea, vomiting, or hematemesis; No diarrhea or constipation. No melena or hematochezia.  GENITOURINARY: No dysuria, frequency, hematuria, or incontinence  NEUROLOGICAL: No headaches, memory loss, loss of strength, numbness, or tremors  MUSCULOSKELETAL: No joint pain or swelling; No muscle, back, or extremity pain      RADIOLOGY & ADDITIONAL TESTS:    Imaging Personally Reviewed:  [ ] YES  [ ] NO    Consultant(s) Notes Reviewed:  [x ] YES  [ ] NO    PHYSICAL EXAM:  GENERAL: NAD  NERVOUS SYSTEM:  Alert & Oriented X3,   CHEST/LUNG: Clear to percussion bilaterally; No rales, rhonchi, wheezing, or rubs  HEART: Regular rate and rhythm; No murmurs, rubs, or gallops  ABDOMEN: Soft, Nontender, Nondistended; Bowel sounds present  EXTREMITIES:  2+ Peripheral Pulses, No clubbing, cyanosis, or edema      A & P:        Care Discussed with Consultants/Other Providers [ x] YES  [ ] NO     INTERVAL HPI/OVERNIGHT EVENTS: Pt seen and examined. No evets overnight.    89y  Vital Signs Last 24 Hrs  T(C): 36.3 (24 Nov 2021 04:55), Max: 36.6 (23 Nov 2021 12:17)  T(F): 97.3 (24 Nov 2021 04:55), Max: 97.8 (23 Nov 2021 12:17)  HR: 57 (24 Nov 2021 04:55) (57 - 86)  BP: 130/70 (24 Nov 2021 04:55) (130/70 - 180/108)  BP(mean): --  RR: 20 (24 Nov 2021 04:55) (17 - 21)  SpO2: 96% (24 Nov 2021 04:55) (95% - 98%)  I&O's Summary    24 Nov 2021 07:01  -  24 Nov 2021 11:51  --------------------------------------------------------  IN: 177 mL / OUT: 0 mL / NET: 177 mL      MEDICATIONS  (STANDING):  aspirin enteric coated 81 milliGRAM(s) Oral daily  atorvastatin 80 milliGRAM(s) Oral at bedtime  cilostazol 50 milliGRAM(s) Oral two times a day  enoxaparin Injectable 40 milliGRAM(s) SubCutaneous daily  gabapentin 300 milliGRAM(s) Oral three times a day  lidocaine   4% Patch 1 Patch Transdermal daily    MEDICATIONS  (PRN):  acetaminophen     Tablet .. 650 milliGRAM(s) Oral every 6 hours PRN Temp greater or equal to 38C (100.4F), Mild Pain (1 - 3), Moderate Pain (4 - 6)  melatonin 3 milliGRAM(s) Oral at bedtime PRN Insomnia  ondansetron Injectable 4 milliGRAM(s) IV Push every 8 hours PRN Nausea and/or Vomiting    LABS:    trop                        11.7   4.17  )-----------( 193      ( 24 Nov 2021 07:34 )             36.9     11-24    145  |  113<H>  |  14  ----------------------------<  83  3.9   |  27  |  1.03    Ca    8.5      24 Nov 2021 07:34  Phos  3.6     11-24  Mg     2.6     11-24    TPro  6.9  /  Alb  2.8<L>  /  TBili  0.3  /  DBili  x   /  AST  13<L>  /  ALT  14  /  AlkPhos  57  11-24    PT/INR - ( 23 Nov 2021 13:09 )   PT: 12.4 sec;   INR: 1.07 ratio         PTT - ( 23 Nov 2021 13:09 )  PTT:28.7 sec    CAPILLARY BLOOD GLUCOSE      POCT Blood Glucose.: 68 mg/dL (23 Nov 2021 12:57)          REVIEW OF SYSTEMS:  CONSTITUTIONAL: No fever, weight loss, or fatigue  RESPIRATORY: No cough, wheezing, chills or hemoptysis; No shortness of breath  CARDIOVASCULAR: No chest pain, palpitations, dizziness, or leg swelling  GASTROINTESTINAL: No abdominal or epigastric pain. No nausea, vomiting, or hematemesis; No diarrhea or constipation. No melena or hematochezia.  GENITOURINARY: No dysuria, frequency, hematuria, or incontinence  NEUROLOGICAL: No headaches, memory loss, loss of strength, numbness, or tremors  MUSCULOSKELETAL: No joint pain or swelling; No muscle, back, or extremity pain      RADIOLOGY & ADDITIONAL TESTS:    Imaging Personally Reviewed:  [ ] YES  [ ] NO    Consultant(s) Notes Reviewed:  [x ] YES  [ ] NO    PHYSICAL EXAM:  GENERAL: NAD  NERVOUS SYSTEM:  Alert & Oriented X3, 2/5 RLE, 4/5 b/l UE, LLE  CHEST/LUNG: Clear to percussion bilaterally; No rales, rhonchi, wheezing, or rubs  HEART: Regular rate and rhythm; No murmurs, rubs, or gallops  ABDOMEN: Soft, Nontender, Nondistended; Bowel sounds present  EXTREMITIES:  2+ Peripheral Pulses, No clubbing, cyanosis, or edema      A & P:        Care Discussed with Consultants/Other Providers [ x] YES  [ ] NO

## 2021-11-24 NOTE — OCCUPATIONAL THERAPY INITIAL EVALUATION ADULT - PRECAUTIONS/LIMITATIONS, REHAB EVAL
Pt should be turned & positioned every 2 hours to prevent skin breakdown due to reduce mobility/cardiac precautions/fall precautions/obesity precautions

## 2021-11-24 NOTE — OCCUPATIONAL THERAPY INITIAL EVALUATION ADULT - GENERAL OBSERVATIONS, REHAB EVAL
Pt was seen for initial OT consult, encountered in bed chair on cardiac monitoring in NAD; pt was AA&Ox4, cooperative. Pt presents with generalized weakness, decreased ROM with stiffness in BUE/ BLE. These limits pt's activity tolerance ,balance, ADL management and functional mobility. Pt is unable to grasp objects with either RUE or LUE due to decreased ROM with arthritic changes.

## 2021-11-24 NOTE — OCCUPATIONAL THERAPY INITIAL EVALUATION ADULT - RANGE OF MOTION EXAMINATION, LOWER EXTREMITY
ROM is limited in BLE by grated than 30 %/bilateral LE Passive ROM was WFL  (within functional limits)/bilateral LE Active Assistive ROM was WFL  (within functional limits)

## 2021-11-25 DIAGNOSIS — M19.90 UNSPECIFIED OSTEOARTHRITIS, UNSPECIFIED SITE: ICD-10-CM

## 2021-11-25 DIAGNOSIS — I10 ESSENTIAL (PRIMARY) HYPERTENSION: ICD-10-CM

## 2021-11-25 DIAGNOSIS — E78.5 HYPERLIPIDEMIA, UNSPECIFIED: ICD-10-CM

## 2021-11-25 DIAGNOSIS — I63.9 CEREBRAL INFARCTION, UNSPECIFIED: ICD-10-CM

## 2021-11-25 PROCEDURE — 99233 SBSQ HOSP IP/OBS HIGH 50: CPT

## 2021-11-25 PROCEDURE — 99232 SBSQ HOSP IP/OBS MODERATE 35: CPT

## 2021-11-25 RX ORDER — AMLODIPINE BESYLATE 2.5 MG/1
5 TABLET ORAL DAILY
Refills: 0 | Status: DISCONTINUED | OUTPATIENT
Start: 2021-11-25 | End: 2021-11-27

## 2021-11-25 RX ADMIN — ENOXAPARIN SODIUM 40 MILLIGRAM(S): 100 INJECTION SUBCUTANEOUS at 11:05

## 2021-11-25 RX ADMIN — Medication 650 MILLIGRAM(S): at 10:00

## 2021-11-25 RX ADMIN — Medication 650 MILLIGRAM(S): at 10:30

## 2021-11-25 RX ADMIN — ATORVASTATIN CALCIUM 80 MILLIGRAM(S): 80 TABLET, FILM COATED ORAL at 21:27

## 2021-11-25 RX ADMIN — CILOSTAZOL 50 MILLIGRAM(S): 100 TABLET ORAL at 05:19

## 2021-11-25 RX ADMIN — GABAPENTIN 300 MILLIGRAM(S): 400 CAPSULE ORAL at 13:09

## 2021-11-25 RX ADMIN — GABAPENTIN 300 MILLIGRAM(S): 400 CAPSULE ORAL at 21:27

## 2021-11-25 RX ADMIN — Medication 3 MILLIGRAM(S): at 21:27

## 2021-11-25 RX ADMIN — LIDOCAINE 1 PATCH: 4 CREAM TOPICAL at 01:00

## 2021-11-25 RX ADMIN — Medication 81 MILLIGRAM(S): at 11:01

## 2021-11-25 RX ADMIN — GABAPENTIN 300 MILLIGRAM(S): 400 CAPSULE ORAL at 05:19

## 2021-11-25 RX ADMIN — Medication 650 MILLIGRAM(S): at 17:02

## 2021-11-25 RX ADMIN — LIDOCAINE 1 PATCH: 4 CREAM TOPICAL at 11:01

## 2021-11-25 RX ADMIN — Medication 650 MILLIGRAM(S): at 16:23

## 2021-11-25 RX ADMIN — CILOSTAZOL 50 MILLIGRAM(S): 100 TABLET ORAL at 17:03

## 2021-11-25 NOTE — PROGRESS NOTE ADULT - ASSESSMENT
89 years old female with h/o HTN, HLD, OA, fibromyalgia, h/o CVA ( 2016 and 2020), s/p bilateral hip replacement present to ED with complain of slurry speech since waking today AM. 89 years old female with h/o HTN, HLD, OA, fibromyalgia, h/o CVA ( 2016 and 2020), s/p bilateral hip replacement, admitted for acute CVA outside of tPA window, no candidate fro mechanical thrombectomy    Acute recurrent lacunar CVA  HTN  HLD  OA  fibromyalgia  -Imaging consistent with possible embolic etiology. TTE negative for PFO, consistent with preserved EF, G1DD, Mild AS, AI, TR. Patient declines LYDIA.   -no arrhythmic events of tele so far  -cw asa 81 D and atorvastatin 40-80 as per neuro  -start amlodipine 5 for sBP<130. There is some evidence that patients with recurrent lacunar infarcts benefit from sBP< 120 mmHg  -PT/OT/Speech evaluation  -cardio consult appreciated   -patent can be dcd to short term rehab tomorrow        89 years old female with h/o HTN, HLD, OA, fibromyalgia, h/o CVA ( 2016 and 2020), s/p bilateral hip replacement, admitted for acute CVA outside of tPA window, no candidate fro mechanical thrombectomy    Acute recurrent lacunar CVA  HTN  HLD  OA  fibromyalgia  -Imaging consistent with possible embolic etiology. TTE negative for PFO, consistent with preserved EF, G1DD, Mild AS, AI, TR. Patient declines LYDIA.   -no arrhythmic events of tele so far  -cw asa 81 D and atorvastatin 40-80 as per neuro  -start amlodipine 5 for sBP<130. There is some evidence that patients with recurrent lacunar infarcts benefit from sBP< 120 mmHg  -PT/OT/Speech evaluation  -cardio consult appreciated   -patent can be dcd to short term rehab tomorrow  -refuses invasive procedures incliding loop recorder. Could benefit from 14 d Holter on DC       89 years old female with h/o HTN, HLD, OA, fibromyalgia, h/o CVA ( 2016 and 2020), s/p bilateral hip replacement, admitted for acute CVA outside of tPA window, no candidate fro mechanical thrombectomy    Acute recurrent lacunar CVA  HTN  HLD  OA  fibromyalgia  -Imaging consistent with possible embolic etiology. TTE negative for PFO, consistent with preserved EF, G1DD, Mild AS, AI, TR. Patient declines LYDIA.   -no arrhythmic events of tele so far  -cw asa 81 D and atorvastatin 40-80 as per neuro  -start amlodipine 5 for sBP<130. There is some evidence that patients with recurrent lacunar infarcts benefit from sBP< 120 mmHg  -PT/OT/Speech evaluation  -cardio consult appreciated   -patent can be dcd to short term rehab tomorrow  -agreeable to loop recorder. Implant before DC

## 2021-11-25 NOTE — PROGRESS NOTE ADULT - ASSESSMENT
89F with HTN, hx of multiple prev CVAs p/w slurred speech, found to have an acute cva.     1. CVA  -cont care per neurology  -tte reviewed  -would try to obtain outpt records from her cardiologist  -d/w RE: cardiac eval, pt not interested in LYDIA. pt not interested in any invasive procedures to her heart including percutaneous procedures  -pt not interested in a ILR.  -can cont to monitor on tele, currently in SR  -cont asa, plavix, statin  -cont supportive care per primary team  -no plans for further inpt cardiac testing

## 2021-11-25 NOTE — PROGRESS NOTE ADULT - SUBJECTIVE AND OBJECTIVE BOX
Resting in bed NAD. no acute events overnight. afebrile and hemodynamically stable. sBP 150 mmHg. No complaints of any new neuro deficits no change in mental status.     REVIEW OF SYSTEMS:  CONSTITUTIONAL: No fever, weight loss, or fatigue  RESPIRATORY: No cough, wheezing, chills or hemoptysis; No shortness of breath  CARDIOVASCULAR: No chest pain, palpitations, dizziness, or leg swelling  GASTROINTESTINAL: No abdominal pain, nausea, vomiting, diarrhea or constipation. No melena or hematochezia.  GENITOURINARY: No dysuria  NEUROLOGICAL: No headaches, memory loss, loss of strength, numbness, or tremors    PHYSICAL EXAM:  GENERAL: NAD  NERVOUS SYSTEM:  Alert & Oriented X3, 2/5 RLE, 4/5 b/l UE, LLE. sensation intact b/l   CHEST/LUNG: cta b/l  HEART: rrr s1 s2   ABDOMEN: Soft, Nontender, Nondistended; Bowel sounds present  EXTREMITIES:  2+ Peripheral Pulses, No clubbing, cyanosis, or edema

## 2021-11-25 NOTE — PROGRESS NOTE ADULT - SUBJECTIVE AND OBJECTIVE BOX
24H hour events:   pt resting  no complaints  no acute events overnight  MEDICATIONS:  amLODIPine   Tablet 5 milliGRAM(s) Oral daily  aspirin enteric coated 81 milliGRAM(s) Oral daily  cilostazol 50 milliGRAM(s) Oral two times a day  enoxaparin Injectable 40 milliGRAM(s) SubCutaneous daily        acetaminophen     Tablet .. 650 milliGRAM(s) Oral every 6 hours PRN  gabapentin 300 milliGRAM(s) Oral three times a day  melatonin 3 milliGRAM(s) Oral at bedtime PRN  ondansetron Injectable 4 milliGRAM(s) IV Push every 8 hours PRN      atorvastatin 80 milliGRAM(s) Oral at bedtime    lidocaine   4% Patch 1 Patch Transdermal daily          PHYSICAL EXAM:  T(C): 36.5 (11-25-21 @ 04:57), Max: 36.9 (11-24-21 @ 16:10)  HR: 66 (11-25-21 @ 04:57) (66 - 80)  BP: 150/83 (11-25-21 @ 04:57) (149/86 - 176/85)  RR: 18 (11-25-21 @ 04:57) (18 - 18)  SpO2: 94% (11-25-21 @ 04:57) (94% - 95%)  Wt(kg): --  I&O's Summary    24 Nov 2021 07:01  -  25 Nov 2021 07:00  --------------------------------------------------------  IN: 537 mL / OUT: 1100 mL / NET: -563 mL        Appearance: Normal	  HEENT:   Normal oral mucosa, PERRL, EOMI	  Lymphatic: No lymphadenopathy  Cardiovascular: Normal S1 S2, No JVD, No murmurs, No edema  Respiratory: Lungs clear to auscultation	  Psychiatry: A & O x 3, Mood & affect appropriate  Gastrointestinal:  Soft, Non-tender, + BS	  Skin: No rashes, No ecchymoses, No cyanosis	  Neurologic: RUE weakness, unchanged from yesterday, pt states this is chronic.   Extremities: Normal range of motion, No clubbing, cyanosis       LABS:	 	    CBC Full  -  ( 24 Nov 2021 07:34 )  WBC Count : 4.17 K/uL  Hemoglobin : 11.7 g/dL  Hematocrit : 36.9 %  Platelet Count - Automated : 193 K/uL  Mean Cell Volume : 86.0 fl  Mean Cell Hemoglobin : 27.3 pg  Mean Cell Hemoglobin Concentration : 31.7 gm/dL  Auto Neutrophil # : x  Auto Lymphocyte # : x  Auto Monocyte # : x  Auto Eosinophil # : x  Auto Basophil # : x  Auto Neutrophil % : x  Auto Lymphocyte % : x  Auto Monocyte % : x  Auto Eosinophil % : x  Auto Basophil % : x    11-24    145  |  113<H>  |  14  ----------------------------<  83  3.9   |  27  |  1.03  11-23    144  |  112<H>  |  10  ----------------------------<  72  3.7   |  23  |  1.01    Ca    8.5      24 Nov 2021 07:34  Ca    8.9      23 Nov 2021 13:09  Phos  3.6     11-24  Mg     2.6     11-24    TPro  6.9  /  Alb  2.8<L>  /  TBili  0.3  /  DBili  x   /  AST  13<L>  /  ALT  14  /  AlkPhos  57  11-24  TPro  7.6  /  Alb  3.3  /  TBili  0.3  /  DBili  x   /  AST  18  /  ALT  19  /  AlkPhos  66  11-23      proBNP:   Lipid Profile:   HgA1c:   TSH:       CARDIAC MARKERS:            TELEMETRY: 	    ECG:  	  RADIOLOGY:  OTHER: 	    PREVIOUS DIAGNOSTIC TESTING:    [ ] Echocardiogram:  [ ]  Catheterization:  [ ] Stress Test:  	  	  ASSESSMENT/PLAN:

## 2021-11-25 NOTE — PROGRESS NOTE ADULT - SUBJECTIVE AND OBJECTIVE BOX
NEUROLOGY CONSULT PROGRESS NOTE    HPI:  Improved speech clarity since yesterday    NEUROLOGICAL EXAM:  T 98 F  /70 HR 85  MS: Awake, alert, oriented x 4, language fluent, comprehension intact, naming intact, repetition intact, normal affect  CN: PERRLA, EOMI, visual fields intact, facial sensation, intact, face symmetric, hearing intact, +dysarthria, tongue midline, symmetric shoulder shrug and SCM strength  Motor: normal bulk, generalized weakness, weakness in lower extremities at baseline due to b/l hip replacement/OA using walker at baseline, no motor asymmetry   Sensation: intact to light touch, vibration  Reflexes: 3+ at biceps, brachioradialis, patella, ankle bilaterally.  Flexor plantar response bilaterally.  No clonus  Coordination: no dysmetria    NIHSS: 5    Assessment:  89 yo woman with an acute ischemic lacunar infarct, with improving dysarthria.      Recommendations:  1. Recommend Aspirin 81mg daily monotherapy (NIHSS > 3) - unless there is a cardiac indication for dual antiplatelet therapy  2. Optimize control of HTN  3. Intensive statin therapy, Atorvastatin 40-80mg daily, target LDL < 70  4. Speech therapy  5. TTE  6. Stroke education    Richard Goldberg MD  [facility] Department of Neurology  Ep[facility]

## 2021-11-26 LAB
ANION GAP SERPL CALC-SCNC: 5 MMOL/L — SIGNIFICANT CHANGE UP (ref 5–17)
BUN SERPL-MCNC: 16 MG/DL — SIGNIFICANT CHANGE UP (ref 7–23)
CALCIUM SERPL-MCNC: 8.7 MG/DL — SIGNIFICANT CHANGE UP (ref 8.5–10.1)
CHLORIDE SERPL-SCNC: 114 MMOL/L — HIGH (ref 96–108)
CO2 SERPL-SCNC: 25 MMOL/L — SIGNIFICANT CHANGE UP (ref 22–31)
CREAT SERPL-MCNC: 0.91 MG/DL — SIGNIFICANT CHANGE UP (ref 0.5–1.3)
FLUAV AG NPH QL: SIGNIFICANT CHANGE UP
FLUBV AG NPH QL: SIGNIFICANT CHANGE UP
GLUCOSE SERPL-MCNC: 93 MG/DL — SIGNIFICANT CHANGE UP (ref 70–99)
HCT VFR BLD CALC: 35.1 % — SIGNIFICANT CHANGE UP (ref 34.5–45)
HGB BLD-MCNC: 11.4 G/DL — LOW (ref 11.5–15.5)
MCHC RBC-ENTMCNC: 27.7 PG — SIGNIFICANT CHANGE UP (ref 27–34)
MCHC RBC-ENTMCNC: 32.5 GM/DL — SIGNIFICANT CHANGE UP (ref 32–36)
MCV RBC AUTO: 85.4 FL — SIGNIFICANT CHANGE UP (ref 80–100)
NRBC # BLD: 0 /100 WBCS — SIGNIFICANT CHANGE UP (ref 0–0)
PLATELET # BLD AUTO: 223 K/UL — SIGNIFICANT CHANGE UP (ref 150–400)
POTASSIUM SERPL-MCNC: 3.7 MMOL/L — SIGNIFICANT CHANGE UP (ref 3.5–5.3)
POTASSIUM SERPL-SCNC: 3.7 MMOL/L — SIGNIFICANT CHANGE UP (ref 3.5–5.3)
RBC # BLD: 4.11 M/UL — SIGNIFICANT CHANGE UP (ref 3.8–5.2)
RBC # FLD: 15.1 % — HIGH (ref 10.3–14.5)
SARS-COV-2 RNA SPEC QL NAA+PROBE: SIGNIFICANT CHANGE UP
SODIUM SERPL-SCNC: 144 MMOL/L — SIGNIFICANT CHANGE UP (ref 135–145)
WBC # BLD: 3.75 K/UL — LOW (ref 3.8–10.5)
WBC # FLD AUTO: 3.75 K/UL — LOW (ref 3.8–10.5)

## 2021-11-26 PROCEDURE — 99233 SBSQ HOSP IP/OBS HIGH 50: CPT

## 2021-11-26 PROCEDURE — 99232 SBSQ HOSP IP/OBS MODERATE 35: CPT

## 2021-11-26 RX ADMIN — GABAPENTIN 300 MILLIGRAM(S): 400 CAPSULE ORAL at 05:15

## 2021-11-26 RX ADMIN — Medication 81 MILLIGRAM(S): at 11:11

## 2021-11-26 RX ADMIN — Medication 650 MILLIGRAM(S): at 21:36

## 2021-11-26 RX ADMIN — Medication 650 MILLIGRAM(S): at 22:30

## 2021-11-26 RX ADMIN — LIDOCAINE 1 PATCH: 4 CREAM TOPICAL at 23:37

## 2021-11-26 RX ADMIN — Medication 650 MILLIGRAM(S): at 13:50

## 2021-11-26 RX ADMIN — ENOXAPARIN SODIUM 40 MILLIGRAM(S): 100 INJECTION SUBCUTANEOUS at 11:11

## 2021-11-26 RX ADMIN — CILOSTAZOL 50 MILLIGRAM(S): 100 TABLET ORAL at 17:12

## 2021-11-26 RX ADMIN — Medication 650 MILLIGRAM(S): at 05:15

## 2021-11-26 RX ADMIN — GABAPENTIN 300 MILLIGRAM(S): 400 CAPSULE ORAL at 21:36

## 2021-11-26 RX ADMIN — LIDOCAINE 1 PATCH: 4 CREAM TOPICAL at 11:12

## 2021-11-26 RX ADMIN — ATORVASTATIN CALCIUM 80 MILLIGRAM(S): 80 TABLET, FILM COATED ORAL at 21:36

## 2021-11-26 RX ADMIN — Medication 650 MILLIGRAM(S): at 13:16

## 2021-11-26 RX ADMIN — GABAPENTIN 300 MILLIGRAM(S): 400 CAPSULE ORAL at 13:16

## 2021-11-26 RX ADMIN — LIDOCAINE 1 PATCH: 4 CREAM TOPICAL at 19:04

## 2021-11-26 RX ADMIN — AMLODIPINE BESYLATE 5 MILLIGRAM(S): 2.5 TABLET ORAL at 05:15

## 2021-11-26 RX ADMIN — CILOSTAZOL 50 MILLIGRAM(S): 100 TABLET ORAL at 05:15

## 2021-11-26 NOTE — PROGRESS NOTE ADULT - ASSESSMENT
89 years old female with history of HTN, HLD, OA, fibromyalgia, h/o CVA ( 2016 and 2020), s/p bilateral hip replacement was admitted for acute CVA.    Acute CVA  - pt outside of tPA window & not candidate fro mechanical thrombectomy  - MRI showed a tiny 2 mm focus of restricted diffusion posterior limb of the RIGHT internal capsule consistent with an acute lacunar infarction. There was mild periventricular and moderate deep and subcortical white matter ischemia & tiny old lacunar infarctions in the right corona radiata, bilateral thalami and posterior right periventricular white matter as well as the bilateral cerebellum  -Imaging consistent with possible embolic etiology.   - TTE negative for PFO, consistent with preserved EF, G1DD, Mild AS, AI, TR. Patient declined LYDIA.  - no arrhythmic events of tele so far  - c/w ASA and atorvastatin   - PT/OT/Speech evaluation  - cardio consult appreciated   - patient refused loop recorder as per cardio & when speaking w/ me     HTN  - c/w amlodipine for sBP<130      HLD  - c/w Atorvastatin     Claudication    - c/w Pletal     fibromyalgia  - c/w gabapentin and PRN Tylenol     PreDM  - Hgb A1C is 6.2  - patient counselled on lifestyle modifications      Prophylaxis:  DVT: Lovenox  GI: PO diet

## 2021-11-26 NOTE — SWALLOW BEDSIDE ASSESSMENT ADULT - H & P REVIEW
89yo female HTN, OA, b/l hip replacements, fibromyalgia, CVA with mild LUE weakness, ambulates with rolling walker p/w dysarthria since waking up this morning. LKN 1230am. Notes b/l "carpal tunnel pain", R shoulder pain, RUE pain, R hip pain that was worse this AM, feels likely arthritis and fibromyalgia pain that intermittently flares. Takes tylenol for pain management. Lives alone, independent. Denies change in vision, falls, head trauma, new weakness/numbness. Patient feels dysarthria is improving. Code stroke called upon arrival./yes

## 2021-11-26 NOTE — PROGRESS NOTE ADULT - ASSESSMENT
89F with HTN, hx of multiple prev CVAs p/w slurred speech, found to have an acute cva.     1. CVA  -cont care per neurology  -tte reviewed  -would try to obtain outpt records from her cardiologist  -d/w RE: cardiac eval, pt not interested in LYDIA. pt not interested in any invasive procedures to her heart including percutaneous procedures  -pt not interested in a ILR.  -cont asa, plavix, statin  -cont supportive care per primary team  -no plans for further inpt cardiac testing

## 2021-11-26 NOTE — PROGRESS NOTE ADULT - SUBJECTIVE AND OBJECTIVE BOX
Patient is a 89y old  Female who presents with a chief complaint of slurry speech-CVA (26 Nov 2021 08:35)      INTERVAL HPI/OVERNIGHT EVENTS:    MEDICATIONS  (STANDING):  amLODIPine   Tablet 5 milliGRAM(s) Oral daily  aspirin enteric coated 81 milliGRAM(s) Oral daily  atorvastatin 80 milliGRAM(s) Oral at bedtime  cilostazol 50 milliGRAM(s) Oral two times a day  enoxaparin Injectable 40 milliGRAM(s) SubCutaneous daily  gabapentin 300 milliGRAM(s) Oral three times a day  lidocaine   4% Patch 1 Patch Transdermal daily    MEDICATIONS  (PRN):  acetaminophen     Tablet .. 650 milliGRAM(s) Oral every 6 hours PRN Temp greater or equal to 38C (100.4F), Mild Pain (1 - 3), Moderate Pain (4 - 6)  melatonin 3 milliGRAM(s) Oral at bedtime PRN Insomnia  ondansetron Injectable 4 milliGRAM(s) IV Push every 8 hours PRN Nausea and/or Vomiting      Allergies    No Known Allergies    Intolerances        Vital Signs Last 24 Hrs  T(C): 36.5 (26 Nov 2021 17:10), Max: 36.5 (26 Nov 2021 17:10)  T(F): 97.7 (26 Nov 2021 17:10), Max: 97.7 (26 Nov 2021 17:10)  HR: 77 (26 Nov 2021 17:10) (66 - 85)  BP: 141/74 (26 Nov 2021 17:10) (130/77 - 170/90)  BP(mean): --  RR: 16 (26 Nov 2021 17:10) (16 - 18)  SpO2: 95% (26 Nov 2021 17:10) (95% - 98%)    PHYSICAL EXAM:  GENERAL: NAD, well-groomed, well-developed  HEAD:  Atraumatic, Normocephalic  EYES: EOMI, PERRLA, conjunctiva and sclera clear  ENMT: No tonsillar erythema, exudates, or enlargement; Moist mucous membranes, Good dentition, No lesions  NECK: Supple, No JVD, Normal thyroid  NERVOUS SYSTEM:  Alert & Oriented X3, Good concentration; Motor Strength 5/5 B/L upper and lower extremities; DTRs 2+ intact and symmetric  CHEST/LUNG: Clear to auscultation bilaterally; No rales, rhonchi, wheezing, or rubs  HEART: Regular rate and rhythm; No murmurs, rubs, or gallops  ABDOMEN: Soft, Nontender, Nondistended; Bowel sounds present  EXTREMITIES:  2+ Peripheral Pulses, No clubbing, cyanosis, or edema  LYMPH: No lymphadenopathy noted  SKIN: No rashes or lesions    LABS:                        11.4   3.75  )-----------( 223      ( 26 Nov 2021 06:22 )             35.1     11-26    144  |  114<H>  |  16  ----------------------------<  93  3.7   |  25  |  0.91    Ca    8.7      26 Nov 2021 06:22          CAPILLARY BLOOD GLUCOSE          RADIOLOGY & ADDITIONAL TESTS:    11-25-21 @ 07:01  -  11-26-21 @ 07:00  --------------------------------------------------------  IN:    Oral Fluid: 820 mL  Total IN: 820 mL    OUT:    Voided (mL): 500 mL  Total OUT: 500 mL    Total NET: 320 mL      11-26-21 @ 07:01  -  11-26-21 @ 19:51  --------------------------------------------------------  IN:    Oral Fluid: 580 mL  Total IN: 580 mL    OUT:  Total OUT: 0 mL    Total NET: 580 mL

## 2021-11-26 NOTE — SWALLOW BEDSIDE ASSESSMENT ADULT - SLP PERTINENT HISTORY OF CURRENT PROBLEM
pt denied difficulty with eating and swallowing. as eval progressed she reported "irritation" "scratchy" right side throat, may be premorbid.

## 2021-11-26 NOTE — SWALLOW BEDSIDE ASSESSMENT ADULT - SWALLOW EVAL: DIAGNOSIS
pt with baseline cough therefore difficult to assess, however oropharyngeal phases of swallow grossly WNL except pt with by facial grimace during swallowing pt with baseline cough therefore difficult to assess, however oropharyngeal phases of swallow grossly WNL except pt with by facial grimace during swallowing. unclear if intermittent cough related to baseline cough

## 2021-11-26 NOTE — PROGRESS NOTE ADULT - SUBJECTIVE AND OBJECTIVE BOX
24H hour events:   pt resting  no acute events overnight    MEDICATIONS:  amLODIPine   Tablet 5 milliGRAM(s) Oral daily  aspirin enteric coated 81 milliGRAM(s) Oral daily  cilostazol 50 milliGRAM(s) Oral two times a day  enoxaparin Injectable 40 milliGRAM(s) SubCutaneous daily        acetaminophen     Tablet .. 650 milliGRAM(s) Oral every 6 hours PRN  gabapentin 300 milliGRAM(s) Oral three times a day  melatonin 3 milliGRAM(s) Oral at bedtime PRN  ondansetron Injectable 4 milliGRAM(s) IV Push every 8 hours PRN      atorvastatin 80 milliGRAM(s) Oral at bedtime    lidocaine   4% Patch 1 Patch Transdermal daily          PHYSICAL EXAM:  T(C): 36 (11-26-21 @ 04:35), Max: 36.7 (11-25-21 @ 10:42)  HR: 66 (11-26-21 @ 04:35) (66 - 85)  BP: 134/76 (11-26-21 @ 04:35) (130/77 - 158/90)  RR: 18 (11-26-21 @ 04:35) (18 - 18)  SpO2: 98% (11-26-21 @ 04:35) (94% - 98%)  Wt(kg): --  I&O's Summary    25 Nov 2021 07:01  -  26 Nov 2021 07:00  --------------------------------------------------------  IN: 820 mL / OUT: 500 mL / NET: 320 mL        Appearance: Normal	  HEENT:   Normal oral mucosa, PERRL, EOMI	  Lymphatic: No lymphadenopathy  Cardiovascular: Normal S1 S2, No JVD, No murmurs, No edema  Respiratory: Lungs clear to auscultation	  Psychiatry: A & O x 3, Mood & affect appropriate  Gastrointestinal:  Soft, Non-tender, + BS	  Skin: No rashes, No ecchymoses, No cyanosis	  Neurologic: RUE weakness, chronic, unchanged from yesterday  Extremities: Normal range of motion, No clubbing, cyanosis      LABS:	 	    CBC Full  -  ( 26 Nov 2021 06:22 )  WBC Count : 3.75 K/uL  Hemoglobin : 11.4 g/dL  Hematocrit : 35.1 %  Platelet Count - Automated : 223 K/uL  Mean Cell Volume : 85.4 fl  Mean Cell Hemoglobin : 27.7 pg  Mean Cell Hemoglobin Concentration : 32.5 gm/dL  Auto Neutrophil # : x  Auto Lymphocyte # : x  Auto Monocyte # : x  Auto Eosinophil # : x  Auto Basophil # : x  Auto Neutrophil % : x  Auto Lymphocyte % : x  Auto Monocyte % : x  Auto Eosinophil % : x  Auto Basophil % : x    11-26    144  |  114<H>  |  16  ----------------------------<  93  3.7   |  25  |  0.91    Ca    8.7      26 Nov 2021 06:22        proBNP:   Lipid Profile:   HgA1c:   TSH:       CARDIAC MARKERS:            TELEMETRY: 	    ECG:  	  RADIOLOGY:  OTHER: 	    PREVIOUS DIAGNOSTIC TESTING:    [ ] Echocardiogram:  [ ]  Catheterization:  [ ] Stress Test:  	  	  ASSESSMENT/PLAN:

## 2021-11-26 NOTE — SWALLOW BEDSIDE ASSESSMENT ADULT - SLP GENERAL OBSERVATIONS
pt seen bedside, alert and oriented x3-4. pt responded to questions with good accuracy, she verbalized wants and was able to follow one step directions. speech intelligibility fair 2/2 decreased breath support/ coordination with respiration and strained strangled vocal pt seen bedside, alert and oriented x3-4. pt responded to questions with good accuracy, she verbalized wants and was able to follow one step directions. speech intelligibility fair 2/2 decreased breath support/ coordination with respiration, strained strangled vocal quality and imprecise articulation

## 2021-11-26 NOTE — SWALLOW BEDSIDE ASSESSMENT ADULT - COMMENTS
MRI head no contrast 11/24/2021 IMPRESSION:   Tiny 2 mm focus of restricted diffusion posterior limb of the RIGHT internal capsule consistent with an acute lacunar infarction. Mild periventricular and moderate deep and subcortical white matter ischemia is noted. Tiny old lacunar infarctions are seen in the RIGHT corona radiata, BILATERAL thalami and posterior RIGHT periventricular white matter as well as the BILATERAL cerebellum. intermittent cough

## 2021-11-27 LAB — GLUCOSE BLDC GLUCOMTR-MCNC: 92 MG/DL — SIGNIFICANT CHANGE UP (ref 70–99)

## 2021-11-27 PROCEDURE — 74021 RADEX ABDOMEN 3+ VIEWS: CPT | Mod: 26

## 2021-11-27 PROCEDURE — 99232 SBSQ HOSP IP/OBS MODERATE 35: CPT

## 2021-11-27 PROCEDURE — 71045 X-RAY EXAM CHEST 1 VIEW: CPT | Mod: 26

## 2021-11-27 RX ORDER — METOCLOPRAMIDE HCL 10 MG
10 TABLET ORAL ONCE
Refills: 0 | Status: COMPLETED | OUTPATIENT
Start: 2021-11-27 | End: 2021-11-27

## 2021-11-27 RX ORDER — METOCLOPRAMIDE HCL 10 MG
5 TABLET ORAL ONCE
Refills: 0 | Status: COMPLETED | OUTPATIENT
Start: 2021-11-27 | End: 2021-11-27

## 2021-11-27 RX ORDER — POLYETHYLENE GLYCOL 3350 17 G/17G
17 POWDER, FOR SOLUTION ORAL DAILY
Refills: 0 | Status: DISCONTINUED | OUTPATIENT
Start: 2021-11-27 | End: 2021-11-28

## 2021-11-27 RX ORDER — AMLODIPINE BESYLATE 2.5 MG/1
5 TABLET ORAL ONCE
Refills: 0 | Status: COMPLETED | OUTPATIENT
Start: 2021-11-27 | End: 2021-11-27

## 2021-11-27 RX ORDER — PANTOPRAZOLE SODIUM 20 MG/1
40 TABLET, DELAYED RELEASE ORAL DAILY
Refills: 0 | Status: DISCONTINUED | OUTPATIENT
Start: 2021-11-27 | End: 2021-11-29

## 2021-11-27 RX ORDER — AMLODIPINE BESYLATE 2.5 MG/1
10 TABLET ORAL DAILY
Refills: 0 | Status: DISCONTINUED | OUTPATIENT
Start: 2021-11-28 | End: 2021-11-30

## 2021-11-27 RX ORDER — SENNA PLUS 8.6 MG/1
2 TABLET ORAL AT BEDTIME
Refills: 0 | Status: DISCONTINUED | OUTPATIENT
Start: 2021-11-27 | End: 2021-11-29

## 2021-11-27 RX ORDER — LACTULOSE 10 G/15ML
20 SOLUTION ORAL ONCE
Refills: 0 | Status: COMPLETED | OUTPATIENT
Start: 2021-11-27 | End: 2021-11-27

## 2021-11-27 RX ADMIN — Medication 81 MILLIGRAM(S): at 11:24

## 2021-11-27 RX ADMIN — Medication 5 MILLIGRAM(S): at 23:08

## 2021-11-27 RX ADMIN — CILOSTAZOL 50 MILLIGRAM(S): 100 TABLET ORAL at 05:34

## 2021-11-27 RX ADMIN — ENOXAPARIN SODIUM 40 MILLIGRAM(S): 100 INJECTION SUBCUTANEOUS at 11:24

## 2021-11-27 RX ADMIN — LIDOCAINE 1 PATCH: 4 CREAM TOPICAL at 19:30

## 2021-11-27 RX ADMIN — LIDOCAINE 1 PATCH: 4 CREAM TOPICAL at 23:40

## 2021-11-27 RX ADMIN — Medication 650 MILLIGRAM(S): at 14:08

## 2021-11-27 RX ADMIN — ONDANSETRON 4 MILLIGRAM(S): 8 TABLET, FILM COATED ORAL at 20:09

## 2021-11-27 RX ADMIN — Medication 650 MILLIGRAM(S): at 15:10

## 2021-11-27 RX ADMIN — LIDOCAINE 1 PATCH: 4 CREAM TOPICAL at 11:24

## 2021-11-27 RX ADMIN — LACTULOSE 20 GRAM(S): 10 SOLUTION ORAL at 15:08

## 2021-11-27 RX ADMIN — GABAPENTIN 300 MILLIGRAM(S): 400 CAPSULE ORAL at 05:34

## 2021-11-27 RX ADMIN — CILOSTAZOL 50 MILLIGRAM(S): 100 TABLET ORAL at 18:01

## 2021-11-27 RX ADMIN — GABAPENTIN 300 MILLIGRAM(S): 400 CAPSULE ORAL at 14:06

## 2021-11-27 RX ADMIN — Medication 10 MILLIGRAM(S): at 18:46

## 2021-11-27 RX ADMIN — AMLODIPINE BESYLATE 5 MILLIGRAM(S): 2.5 TABLET ORAL at 05:34

## 2021-11-27 RX ADMIN — AMLODIPINE BESYLATE 5 MILLIGRAM(S): 2.5 TABLET ORAL at 18:46

## 2021-11-27 NOTE — CHART NOTE - NSCHARTNOTEFT_GEN_A_CORE
Covering Hospitalist    Code stroke called at approximately 620pm   Per RN, pt dizzy, ? weakness.   However, pt reports being at baseline.  No weakness / numbness.    Strength symmetric.  No dysarthria.  Fluent speech  I confirmed with Dr. Galindo regarding pt's baseline status.  Code stroke cancelled.  Advised RN to recall if pt develops additional / new symptoms or change in status.

## 2021-11-27 NOTE — PROGRESS NOTE ADULT - SUBJECTIVE AND OBJECTIVE BOX
Patient is a 89y old  Female who presents with a chief complaint of slurry speech-CVA (26 Nov 2021 19:51)      INTERVAL HPI/OVERNIGHT EVENTS:    MEDICATIONS  (STANDING):  aspirin enteric coated 81 milliGRAM(s) Oral daily  atorvastatin 80 milliGRAM(s) Oral at bedtime  cilostazol 50 milliGRAM(s) Oral two times a day  enoxaparin Injectable 40 milliGRAM(s) SubCutaneous daily  gabapentin 300 milliGRAM(s) Oral three times a day  lidocaine   4% Patch 1 Patch Transdermal daily  pantoprazole  Injectable 40 milliGRAM(s) IV Push daily  polyethylene glycol 3350 17 Gram(s) Oral daily  senna 2 Tablet(s) Oral at bedtime    MEDICATIONS  (PRN):  acetaminophen     Tablet .. 650 milliGRAM(s) Oral every 6 hours PRN Temp greater or equal to 38C (100.4F), Mild Pain (1 - 3), Moderate Pain (4 - 6)  melatonin 3 milliGRAM(s) Oral at bedtime PRN Insomnia  ondansetron Injectable 4 milliGRAM(s) IV Push every 8 hours PRN Nausea and/or Vomiting      Allergies    No Known Allergies    Intolerances        Vital Signs Last 24 Hrs  T(C): 36.8 (27 Nov 2021 16:18), Max: 36.8 (27 Nov 2021 16:18)  T(F): 98.2 (27 Nov 2021 16:18), Max: 98.2 (27 Nov 2021 16:18)  HR: 78 (27 Nov 2021 18:30) (68 - 79)  BP: 157/89 (27 Nov 2021 18:30) (144/87 - 157/89)  BP(mean): --  RR: 18 (27 Nov 2021 16:18) (17 - 18)  SpO2: 96% (27 Nov 2021 16:18) (96% - 99%)    PHYSICAL EXAM:  GENERAL: NAD, well-groomed, well-developed  HEAD:  Atraumatic, Normocephalic  EYES: EOMI, PERRLA, conjunctiva and sclera clear  ENMT: No tonsillar erythema, exudates, or enlargement; Moist mucous membranes, Good dentition, No lesions  NECK: Supple, No JVD, Normal thyroid  NERVOUS SYSTEM:  Alert & Oriented X3, Good concentration; Motor Strength 5/5 B/L upper and lower extremities; DTRs 2+ intact and symmetric  CHEST/LUNG: Clear to auscultation bilaterally; No rales, rhonchi, wheezing, or rubs  HEART: Regular rate and rhythm; No murmurs, rubs, or gallops  ABDOMEN: Soft, Nontender, Nondistended; Bowel sounds present  EXTREMITIES:  2+ Peripheral Pulses, No clubbing, cyanosis, or edema  LYMPH: No lymphadenopathy noted  SKIN: No rashes or lesions    LABS:                        11.4   3.75  )-----------( 223      ( 26 Nov 2021 06:22 )             35.1     11-26    144  |  114<H>  |  16  ----------------------------<  93  3.7   |  25  |  0.91    Ca    8.7      26 Nov 2021 06:22          CAPILLARY BLOOD GLUCOSE      POCT Blood Glucose.: 92 mg/dL (27 Nov 2021 18:25)      RADIOLOGY & ADDITIONAL TESTS:    11-26-21 @ 07:01  -  11-27-21 @ 07:00  --------------------------------------------------------  IN:    Oral Fluid: 580 mL  Total IN: 580 mL    OUT:    Voided (mL): 400 mL  Total OUT: 400 mL    Total NET: 180 mL      11-27-21 @ 07:01  -  11-27-21 @ 23:26  --------------------------------------------------------  IN:    Oral Fluid: 177 mL  Total IN: 177 mL    OUT:    Voided (mL): 750 mL  Total OUT: 750 mL    Total NET: -573 mL

## 2021-11-28 LAB
ANION GAP SERPL CALC-SCNC: 6 MMOL/L — SIGNIFICANT CHANGE UP (ref 5–17)
BUN SERPL-MCNC: 17 MG/DL — SIGNIFICANT CHANGE UP (ref 7–23)
CALCIUM SERPL-MCNC: 9 MG/DL — SIGNIFICANT CHANGE UP (ref 8.5–10.1)
CHLORIDE SERPL-SCNC: 114 MMOL/L — HIGH (ref 96–108)
CO2 SERPL-SCNC: 24 MMOL/L — SIGNIFICANT CHANGE UP (ref 22–31)
CREAT SERPL-MCNC: 1.12 MG/DL — SIGNIFICANT CHANGE UP (ref 0.5–1.3)
GLUCOSE SERPL-MCNC: 97 MG/DL — SIGNIFICANT CHANGE UP (ref 70–99)
HCT VFR BLD CALC: 41.4 % — SIGNIFICANT CHANGE UP (ref 34.5–45)
HGB BLD-MCNC: 13.3 G/DL — SIGNIFICANT CHANGE UP (ref 11.5–15.5)
MAGNESIUM SERPL-MCNC: 2.3 MG/DL — SIGNIFICANT CHANGE UP (ref 1.6–2.6)
MCHC RBC-ENTMCNC: 27.8 PG — SIGNIFICANT CHANGE UP (ref 27–34)
MCHC RBC-ENTMCNC: 32.1 GM/DL — SIGNIFICANT CHANGE UP (ref 32–36)
MCV RBC AUTO: 86.6 FL — SIGNIFICANT CHANGE UP (ref 80–100)
NRBC # BLD: 0 /100 WBCS — SIGNIFICANT CHANGE UP (ref 0–0)
PHOSPHATE SERPL-MCNC: 4.6 MG/DL — HIGH (ref 2.5–4.5)
PLATELET # BLD AUTO: 288 K/UL — SIGNIFICANT CHANGE UP (ref 150–400)
POTASSIUM SERPL-MCNC: 4.3 MMOL/L — SIGNIFICANT CHANGE UP (ref 3.5–5.3)
POTASSIUM SERPL-SCNC: 4.3 MMOL/L — SIGNIFICANT CHANGE UP (ref 3.5–5.3)
RBC # BLD: 4.78 M/UL — SIGNIFICANT CHANGE UP (ref 3.8–5.2)
RBC # FLD: 15.4 % — HIGH (ref 10.3–14.5)
SODIUM SERPL-SCNC: 144 MMOL/L — SIGNIFICANT CHANGE UP (ref 135–145)
WBC # BLD: 5.99 K/UL — SIGNIFICANT CHANGE UP (ref 3.8–10.5)
WBC # FLD AUTO: 5.99 K/UL — SIGNIFICANT CHANGE UP (ref 3.8–10.5)

## 2021-11-28 PROCEDURE — 99232 SBSQ HOSP IP/OBS MODERATE 35: CPT

## 2021-11-28 RX ORDER — SIMETHICONE 80 MG/1
80 TABLET, CHEWABLE ORAL ONCE
Refills: 0 | Status: COMPLETED | OUTPATIENT
Start: 2021-11-28 | End: 2021-11-28

## 2021-11-28 RX ORDER — METOCLOPRAMIDE HCL 10 MG
10 TABLET ORAL ONCE
Refills: 0 | Status: COMPLETED | OUTPATIENT
Start: 2021-11-28 | End: 2021-11-28

## 2021-11-28 RX ADMIN — ONDANSETRON 4 MILLIGRAM(S): 8 TABLET, FILM COATED ORAL at 06:07

## 2021-11-28 RX ADMIN — PANTOPRAZOLE SODIUM 40 MILLIGRAM(S): 20 TABLET, DELAYED RELEASE ORAL at 12:42

## 2021-11-28 RX ADMIN — LIDOCAINE 1 PATCH: 4 CREAM TOPICAL at 12:43

## 2021-11-28 RX ADMIN — Medication 10 MILLIGRAM(S): at 08:44

## 2021-11-28 RX ADMIN — CILOSTAZOL 50 MILLIGRAM(S): 100 TABLET ORAL at 17:51

## 2021-11-28 RX ADMIN — GABAPENTIN 300 MILLIGRAM(S): 400 CAPSULE ORAL at 21:38

## 2021-11-28 RX ADMIN — ATORVASTATIN CALCIUM 80 MILLIGRAM(S): 80 TABLET, FILM COATED ORAL at 21:38

## 2021-11-28 RX ADMIN — Medication 81 MILLIGRAM(S): at 12:48

## 2021-11-28 RX ADMIN — ENOXAPARIN SODIUM 40 MILLIGRAM(S): 100 INJECTION SUBCUTANEOUS at 12:42

## 2021-11-28 RX ADMIN — GABAPENTIN 300 MILLIGRAM(S): 400 CAPSULE ORAL at 15:23

## 2021-11-28 RX ADMIN — SIMETHICONE 80 MILLIGRAM(S): 80 TABLET, CHEWABLE ORAL at 09:39

## 2021-11-28 RX ADMIN — LIDOCAINE 1 PATCH: 4 CREAM TOPICAL at 19:38

## 2021-11-28 NOTE — PROGRESS NOTE ADULT - ASSESSMENT
89 years old female with history of HTN, HLD, OA, fibromyalgia, h/o CVA ( 2016 and 2020), s/p bilateral hip replacement was admitted for acute CVA.    Acute CVA  - pt outside of tPA window & not candidate fro mechanical thrombectomy  - MRI showed a tiny 2 mm focus of restricted diffusion posterior limb of the RIGHT internal capsule consistent with an acute lacunar infarction. There was mild periventricular and moderate deep and subcortical white matter ischemia & tiny old lacunar infarctions in the right corona radiata, bilateral thalami and posterior right periventricular white matter as well as the bilateral cerebellum  - Imaging consistent with possible embolic etiology  - TTE negative for PFO, consistent with preserved EF, G1DD, Mild AS, AI, TR. Patient declined LYDIA  - no arrhythmic events of tele so far  - c/w ASA and atorvastatin   - PT/OT/Speech evaluation  - cardio consult appreciated   - patient refused loop recorder as per cardio & when speaking w/ me     Diarrhea  - may be from Miralax - which has been stopped  - no leukocytosis, fevers or signs of infectious cause      HTN  - c/w amlodipine for sBP<130      HLD  - c/w Atorvastatin     Claudication    - c/w Pletal     fibromyalgia  - c/w gabapentin and PRN Tylenol     PreDM  - Hgb A1C is 6.2  - patient counselled on lifestyle modifications      Prophylaxis:  DVT: Lovenox  GI: PO diet 89 years old female with history of HTN, HLD, OA, fibromyalgia, CVA ( 2016 and 2020), Neuropathy s/p bilateral hip replacement was admitted for acute CVA.     Acute CVA  - pt outside of tPA window & not candidate fro mechanical thrombectomy  - CT head showed volume loss w/ unchanged remote right centrum semiovale lacunar infarct, interval more pronounced decreased white matter attenuation, likely microvascular disease, with remote and age indeterminate lacunar infarcts, slightly more pronounced decreased attenuation left inferior lateral temporal lobe with redemonstration bony irregularity, arachnoid granulations along the middle cranial fossa with inferolateral displacement of the temporal lobe into a prominent left inferolateral arachnoid granulation which can be associated with new ischemia and  unchanged globus pallidus mineralization, no new hemorrhage or shift  - CTA showed atherosclerotic vascular calcification multifocal stenosis cavernous, supraclinoid ICAs, patent bifurcation segments, patent proximal M1, with filling defects, stenosis and irregularity along the left M2 MCA frontotemporal branches with stenosis along the distal bilateral MCA CARINA MPC branches  - CTA neck showed tortuous, patent, CCAs, ECAs, ICAs w/ no  hemodynamically significant stenosis at  ICA origins. Bilateral vertebral arteries are patent without flow limiting stenosis  - MRI showed a tiny 2 mm focus of restricted diffusion posterior limb of the RIGHT internal capsule consistent with an acute lacunar infarction. There was mild periventricular and moderate deep and subcortical white matter ischemia & tiny old lacunar infarctions in the right corona radiata, bilateral thalami and posterior right periventricular white matter as well as the bilateral cerebellum  - Imaging consistent with possible embolic etiology  - TTE negative for PFO, EF  55 to 60% with left ventricular concentric remodeling w/ grade I diastolic dysfunction, Mild AS, AI, TR. Patient declined LYDIA  - no arrhythmic events of tele so far  - c/w ASA and atorvastatin   - PT/OT/Speech evaluation  - cardio consult appreciated   - patient refused loop recorder as per cardio & when speaking w/ me     Diarrhea  - may be from Miralax - which has been stopped  - no leukocytosis, fevers or signs of infectious cause      HTN  - c/w amlodipine for sBP<130      HLD  - c/w Atorvastatin     Claudication    - c/w Pletal     fibromyalgia  - c/w gabapentin and PRN Tylenol     PreDM  - Hgb A1C is 6.2  - patient counselled on lifestyle modifications      Prophylaxis:  DVT: Lovenox  GI: PO diet

## 2021-11-28 NOTE — PROGRESS NOTE ADULT - SUBJECTIVE AND OBJECTIVE BOX
89 years old female with history of HTN, HLD, OA, fibromyalgia, h/o CVA ( 2016 and 2020), s/p bilateral hip replacement was admitted for acute CVA. She is lying in bed in NAD. She developed vomiting and diarrhea overnight but reports no more vomiting at this time.       MEDICATIONS  (STANDING):  amLODIPine   Tablet 10 milliGRAM(s) Oral daily  aspirin enteric coated 81 milliGRAM(s) Oral daily  atorvastatin 80 milliGRAM(s) Oral at bedtime  cilostazol 50 milliGRAM(s) Oral two times a day  enoxaparin Injectable 40 milliGRAM(s) SubCutaneous daily  gabapentin 300 milliGRAM(s) Oral three times a day  lidocaine   4% Patch 1 Patch Transdermal daily  pantoprazole  Injectable 40 milliGRAM(s) IV Push daily  senna 2 Tablet(s) Oral at bedtime    MEDICATIONS  (PRN):  acetaminophen     Tablet .. 650 milliGRAM(s) Oral every 6 hours PRN Temp greater or equal to 38C (100.4F), Mild Pain (1 - 3), Moderate Pain (4 - 6)  melatonin 3 milliGRAM(s) Oral at bedtime PRN Insomnia  ondansetron Injectable 4 milliGRAM(s) IV Push every 8 hours PRN Nausea and/or Vomiting      Allergies    No Known Allergies    Intolerances        Vital Signs Last 24 Hrs  T(C): 36.7 (28 Nov 2021 11:29), Max: 36.8 (27 Nov 2021 23:31)  T(F): 98 (28 Nov 2021 11:29), Max: 98.2 (27 Nov 2021 23:31)  HR: 91 (28 Nov 2021 11:29) (74 - 97)  BP: 119/78 (28 Nov 2021 11:29) (119/78 - 157/89)   RR: 18 (28 Nov 2021 11:29) (16 - 18)  SpO2: 93% (28 Nov 2021 11:29) (92% - 95%)    PHYSICAL EXAM:  GENERAL: NAD, well-groomed, well-developed  HEAD:  Atraumatic    NECK: Supple   NERVOUS SYSTEM:  Alert & Oriented X3, Good concentration   CHEST/LUNG: Clear to auscultation bilaterally; No rales, rhonchi, wheezing, or rubs  HEART: Regular rate and rhythm; No murmurs, rubs, or gallops  ABDOMEN: Soft, Nontender, Nondistended; Bowel sounds present  EXTREMITIES: No clubbing, cyanosis, or edema       LABS:                        13.3   5.99  )-----------( 288      ( 28 Nov 2021 07:12 )             41.4     11-28    144  |  114<H>  |  17  ----------------------------<  97  4.3   |  24  |  1.12    Ca    9.0      28 Nov 2021 07:12  Phos  4.6     11-28  Mg     2.3     11-28       POCT Blood Glucose.: 92 mg/dL (27 Nov 2021 18:25)      RADIOLOGY & ADDITIONAL TESTS:    11-27-21 @ 07:01  -  11-28-21 @ 07:00  --------------------------------------------------------  IN:    Oral Fluid: 177 mL  Total IN: 177 mL    OUT:    Voided (mL): 750 mL  Total OUT: 750 mL    Total NET: -573 mL       89 years old female with history of HTN, HLD, OA, fibromyalgia, h/o CVA ( 2016 and 2020), s/p bilateral hip replacement was admitted for acute CVA. She is lying in bed in NAD. She developed vomiting and diarrhea overnight but reports no more vomiting at this time.       MEDICATIONS  (STANDING):  amLODIPine   Tablet 10 milliGRAM(s) Oral daily  aspirin enteric coated 81 milliGRAM(s) Oral daily  atorvastatin 80 milliGRAM(s) Oral at bedtime  cilostazol 50 milliGRAM(s) Oral two times a day  enoxaparin Injectable 40 milliGRAM(s) SubCutaneous daily  gabapentin 300 milliGRAM(s) Oral three times a day  lidocaine   4% Patch 1 Patch Transdermal daily  pantoprazole  Injectable 40 milliGRAM(s) IV Push daily  senna 2 Tablet(s) Oral at bedtime    MEDICATIONS  (PRN):  acetaminophen     Tablet .. 650 milliGRAM(s) Oral every 6 hours PRN Temp greater or equal to 38C (100.4F), Mild Pain (1 - 3), Moderate Pain (4 - 6)  melatonin 3 milliGRAM(s) Oral at bedtime PRN Insomnia  ondansetron Injectable 4 milliGRAM(s) IV Push every 8 hours PRN Nausea and/or Vomiting      Allergies    No Known Allergies    Intolerances        Vital Signs Last 24 Hrs  T(C): 36.7 (28 Nov 2021 11:29), Max: 36.8 (27 Nov 2021 23:31)  T(F): 98 (28 Nov 2021 11:29), Max: 98.2 (27 Nov 2021 23:31)  HR: 91 (28 Nov 2021 11:29) (74 - 97)   BP: 119/78 (28 Nov 2021 11:29) (119/78 - 157/89)   RR: 18 (28 Nov 2021 11:29) (16 - 18)  SpO2: 93% (28 Nov 2021 11:29) (92% - 95%)    PHYSICAL EXAM:  GENERAL: NAD, well-groomed, well-developed  HEAD:  Atraumatic    NECK: Supple   NERVOUS SYSTEM:  Alert & Oriented X3, Good concentration   CHEST/LUNG: Clear to auscultation bilaterally; No rales, rhonchi, wheezing, or rubs  HEART: Regular rate and rhythm; No murmurs, rubs, or gallops  ABDOMEN: Soft, Nontender, Nondistended; Bowel sounds present  EXTREMITIES: No clubbing, cyanosis, or edema       LABS:                        13.3   5.99  )-----------( 288      ( 28 Nov 2021 07:12 )             41.4     11-28    144  |  114<H>  |  17  ----------------------------<  97  4.3   |  24  |  1.12    Ca    9.0      28 Nov 2021 07:12  Phos  4.6     11-28  Mg     2.3     11-28       POCT Blood Glucose.: 92 mg/dL (27 Nov 2021 18:25)      RADIOLOGY & ADDITIONAL TESTS:    11-27-21 @ 07:01  -  11-28-21 @ 07:00  --------------------------------------------------------  IN:    Oral Fluid: 177 mL  Total IN: 177 mL    OUT:    Voided (mL): 750 mL  Total OUT: 750 mL    Total NET: -573 mL       89 years old female with history of HTN, HLD, OA, fibromyalgia, h/o CVA ( 2016 and 2020), s/p bilateral hip replacement was admitted for acute CVA. She is lying in bed in NAD. She developed vomiting and diarrhea overnight but reports no more vomiting at this time.       MEDICATIONS  (STANDING):  amLODIPine   Tablet 10 milliGRAM(s) Oral daily  aspirin enteric coated 81 milliGRAM(s) Oral daily  atorvastatin 80 milliGRAM(s) Oral at bedtime  cilostazol 50 milliGRAM(s) Oral two times a day  enoxaparin Injectable 40 milliGRAM(s) SubCutaneous daily  gabapentin 300 milliGRAM(s) Oral three times a day  lidocaine   4% Patch 1 Patch Transdermal daily  pantoprazole  Injectable 40 milliGRAM(s) IV Push daily  senna 2 Tablet(s) Oral at bedtime    MEDICATIONS  (PRN):  acetaminophen     Tablet .. 650 milliGRAM(s) Oral every 6 hours PRN Temp greater or equal to 38C (100.4F), Mild Pain (1 - 3), Moderate Pain (4 - 6)  melatonin 3 milliGRAM(s) Oral at bedtime PRN Insomnia  ondansetron Injectable 4 milliGRAM(s) IV Push every 8 hours PRN Nausea and/or Vomiting      Allergies    No Known Allergies    Intolerances     Vital Signs Last 24 Hrs  T(C): 36.7 (28 Nov 2021 11:29), Max: 36.8 (27 Nov 2021 23:31)  T(F): 98 (28 Nov 2021 11:29), Max: 98.2 (27 Nov 2021 23:31)  HR: 91 (28 Nov 2021 11:29) (74 - 97)   BP: 119/78 (28 Nov 2021 11:29) (119/78 - 157/89)   RR: 18 (28 Nov 2021 11:29) (16 - 18)  SpO2: 93% (28 Nov 2021 11:29) (92% - 95%)    PHYSICAL EXAM:  GENERAL: NAD, well-groomed, well-developed  HEAD:  Atraumatic    NECK: Supple   NERVOUS SYSTEM:  Alert & Oriented X3, Good concentration   CHEST/LUNG: Clear to auscultation bilaterally; No rales, rhonchi, wheezing, or rubs  HEART: Regular rate and rhythm; No murmurs, rubs, or gallops  ABDOMEN: Soft, Nontender, Nondistended; Bowel sounds present  EXTREMITIES: No clubbing, cyanosis, or edema       LABS:                        13.3   5.99  )-----------( 288      ( 28 Nov 2021 07:12 )             41.4     11-28    144  |  114<H>  |  17  ----------------------------<  97  4.3   |  24  |  1.12    Ca    9.0      28 Nov 2021 07:12  Phos  4.6     11-28  Mg     2.3     11-28       POCT Blood Glucose.: 92 mg/dL (27 Nov 2021 18:25)      RADIOLOGY & ADDITIONAL TESTS:    11-27-21 @ 07:01  -  11-28-21 @ 07:00  --------------------------------------------------------  IN:    Oral Fluid: 177 mL  Total IN: 177 mL    OUT:    Voided (mL): 750 mL  Total OUT: 750 mL    Total NET: -573 mL

## 2021-11-29 LAB
ANION GAP SERPL CALC-SCNC: 5 MMOL/L — SIGNIFICANT CHANGE UP (ref 5–17)
BUN SERPL-MCNC: 21 MG/DL — SIGNIFICANT CHANGE UP (ref 7–23)
CALCIUM SERPL-MCNC: 8.7 MG/DL — SIGNIFICANT CHANGE UP (ref 8.5–10.1)
CHLORIDE SERPL-SCNC: 113 MMOL/L — HIGH (ref 96–108)
CO2 SERPL-SCNC: 22 MMOL/L — SIGNIFICANT CHANGE UP (ref 22–31)
CREAT SERPL-MCNC: 1.11 MG/DL — SIGNIFICANT CHANGE UP (ref 0.5–1.3)
FLUAV AG NPH QL: SIGNIFICANT CHANGE UP
FLUBV AG NPH QL: SIGNIFICANT CHANGE UP
GLUCOSE SERPL-MCNC: 82 MG/DL — SIGNIFICANT CHANGE UP (ref 70–99)
HCT VFR BLD CALC: 41.3 % — SIGNIFICANT CHANGE UP (ref 34.5–45)
HGB BLD-MCNC: 13.1 G/DL — SIGNIFICANT CHANGE UP (ref 11.5–15.5)
MAGNESIUM SERPL-MCNC: 2.5 MG/DL — SIGNIFICANT CHANGE UP (ref 1.6–2.6)
MCHC RBC-ENTMCNC: 27.5 PG — SIGNIFICANT CHANGE UP (ref 27–34)
MCHC RBC-ENTMCNC: 31.7 GM/DL — LOW (ref 32–36)
MCV RBC AUTO: 86.8 FL — SIGNIFICANT CHANGE UP (ref 80–100)
NRBC # BLD: 0 /100 WBCS — SIGNIFICANT CHANGE UP (ref 0–0)
PHOSPHATE SERPL-MCNC: 3.4 MG/DL — SIGNIFICANT CHANGE UP (ref 2.5–4.5)
PLATELET # BLD AUTO: 283 K/UL — SIGNIFICANT CHANGE UP (ref 150–400)
POTASSIUM SERPL-MCNC: 4.1 MMOL/L — SIGNIFICANT CHANGE UP (ref 3.5–5.3)
POTASSIUM SERPL-SCNC: 4.1 MMOL/L — SIGNIFICANT CHANGE UP (ref 3.5–5.3)
RBC # BLD: 4.76 M/UL — SIGNIFICANT CHANGE UP (ref 3.8–5.2)
RBC # FLD: 15.3 % — HIGH (ref 10.3–14.5)
SARS-COV-2 RNA SPEC QL NAA+PROBE: SIGNIFICANT CHANGE UP
SODIUM SERPL-SCNC: 140 MMOL/L — SIGNIFICANT CHANGE UP (ref 135–145)
WBC # BLD: 4.56 K/UL — SIGNIFICANT CHANGE UP (ref 3.8–10.5)
WBC # FLD AUTO: 4.56 K/UL — SIGNIFICANT CHANGE UP (ref 3.8–10.5)

## 2021-11-29 PROCEDURE — 99232 SBSQ HOSP IP/OBS MODERATE 35: CPT

## 2021-11-29 RX ORDER — LOPERAMIDE HCL 2 MG
4 TABLET ORAL ONCE
Refills: 0 | Status: COMPLETED | OUTPATIENT
Start: 2021-11-29 | End: 2021-11-29

## 2021-11-29 RX ORDER — PANTOPRAZOLE SODIUM 20 MG/1
40 TABLET, DELAYED RELEASE ORAL DAILY
Refills: 0 | Status: DISCONTINUED | OUTPATIENT
Start: 2021-11-29 | End: 2021-11-30

## 2021-11-29 RX ORDER — NYSTATIN CREAM 100000 [USP'U]/G
1 CREAM TOPICAL
Refills: 0 | Status: DISCONTINUED | OUTPATIENT
Start: 2021-11-29 | End: 2021-11-30

## 2021-11-29 RX ADMIN — PANTOPRAZOLE SODIUM 40 MILLIGRAM(S): 20 TABLET, DELAYED RELEASE ORAL at 12:01

## 2021-11-29 RX ADMIN — CILOSTAZOL 50 MILLIGRAM(S): 100 TABLET ORAL at 05:32

## 2021-11-29 RX ADMIN — NYSTATIN CREAM 1 APPLICATION(S): 100000 CREAM TOPICAL at 17:33

## 2021-11-29 RX ADMIN — CILOSTAZOL 50 MILLIGRAM(S): 100 TABLET ORAL at 17:33

## 2021-11-29 RX ADMIN — Medication 650 MILLIGRAM(S): at 04:29

## 2021-11-29 RX ADMIN — GABAPENTIN 300 MILLIGRAM(S): 400 CAPSULE ORAL at 05:32

## 2021-11-29 RX ADMIN — Medication 81 MILLIGRAM(S): at 12:01

## 2021-11-29 RX ADMIN — LIDOCAINE 1 PATCH: 4 CREAM TOPICAL at 00:37

## 2021-11-29 RX ADMIN — ENOXAPARIN SODIUM 40 MILLIGRAM(S): 100 INJECTION SUBCUTANEOUS at 12:01

## 2021-11-29 RX ADMIN — ATORVASTATIN CALCIUM 80 MILLIGRAM(S): 80 TABLET, FILM COATED ORAL at 22:08

## 2021-11-29 RX ADMIN — AMLODIPINE BESYLATE 10 MILLIGRAM(S): 2.5 TABLET ORAL at 05:32

## 2021-11-29 RX ADMIN — GABAPENTIN 300 MILLIGRAM(S): 400 CAPSULE ORAL at 22:08

## 2021-11-29 RX ADMIN — GABAPENTIN 300 MILLIGRAM(S): 400 CAPSULE ORAL at 13:01

## 2021-11-29 RX ADMIN — Medication 4 MILLIGRAM(S): at 18:14

## 2021-11-29 NOTE — PROGRESS NOTE ADULT - SUBJECTIVE AND OBJECTIVE BOX
89 years old female with history of HTN, HLD, OA, fibromyalgia, h/o CVA ( 2016 and 2020), s/p bilateral hip replacement was admitted for acute CVA. She is lying in bed in NAD. She developed vomiting and diarrhea overnight but reports no more vomiting at this time.      MEDICATIONS  (STANDING):  amLODIPine   Tablet 10 milliGRAM(s) Oral daily  aspirin enteric coated 81 milliGRAM(s) Oral daily  atorvastatin 80 milliGRAM(s) Oral at bedtime  cilostazol 50 milliGRAM(s) Oral two times a day  enoxaparin Injectable 40 milliGRAM(s) SubCutaneous daily  gabapentin 300 milliGRAM(s) Oral three times a day  lidocaine   4% Patch 1 Patch Transdermal daily  nystatin Cream 1 Application(s) Topical two times a day  pantoprazole    Tablet 40 milliGRAM(s) Oral daily    MEDICATIONS  (PRN):  acetaminophen     Tablet .. 650 milliGRAM(s) Oral every 6 hours PRN Temp greater or equal to 38C (100.4F), Mild Pain (1 - 3), Moderate Pain (4 - 6)  melatonin 3 milliGRAM(s) Oral at bedtime PRN Insomnia  ondansetron Injectable 4 milliGRAM(s) IV Push every 8 hours PRN Nausea and/or Vomiting      Allergies    No Known Allergies    Intolerances        Vital Signs Last 24 Hrs  T(C): 37.1 (29 Nov 2021 16:39), Max: 37.1 (29 Nov 2021 16:39)  T(F): 98.8 (29 Nov 2021 16:39), Max: 98.8 (29 Nov 2021 16:39)  HR: 77 (29 Nov 2021 16:39) (72 - 78)  BP: 121/80 (29 Nov 2021 16:39) (107/61 - 132/77)   RR: 16 (29 Nov 2021 16:39) (16 - 18)  SpO2: 94% (29 Nov 2021 16:39) (93% - 98%)    PHYSICAL EXAM:  GENERAL: NAD, well-groomed, well-developed  HEAD:  Atraumatic    NECK: Supple   NERVOUS SYSTEM:  Alert & Oriented X3, Good concentration   CHEST/LUNG: Clear to auscultation bilaterally; No rales, rhonchi, wheezing, or rubs  HEART: Regular rate and rhythm; No murmurs, rubs, or gallops  ABDOMEN: Soft, Nontender, Nondistended; Bowel sounds present  EXTREMITIES: No clubbing, cyanosis, or edema    LABS:                        13.1   4.56  )-----------( 283      ( 29 Nov 2021 07:47 )             41.3     11-29    140  |  113<H>  |  21  ----------------------------<  82  4.1   |  22  |  1.11    Ca    8.7      29 Nov 2021 07:47  Phos  3.4     11-29  Mg     2.5     11-29         RADIOLOGY & ADDITIONAL TESTS:

## 2021-11-29 NOTE — PROGRESS NOTE ADULT - ASSESSMENT
89 years old female with history of HTN, HLD, OA, fibromyalgia, CVA ( 2016 and 2020), Neuropathy s/p bilateral hip replacement was admitted for acute CVA.     Acute CVA  - pt outside of tPA window & not candidate fro mechanical thrombectomy  - CT head showed volume loss w/ unchanged remote right centrum semiovale lacunar infarct, interval more pronounced decreased white matter attenuation, likely microvascular disease, with remote and age indeterminate lacunar infarcts, slightly more pronounced decreased attenuation left inferior lateral temporal lobe with redemonstration bony irregularity, arachnoid granulations along the middle cranial fossa with inferolateral displacement of the temporal lobe into a prominent left inferolateral arachnoid granulation which can be associated with new ischemia and  unchanged globus pallidus mineralization, no new hemorrhage or shift  - CTA showed atherosclerotic vascular calcification multifocal stenosis cavernous, supraclinoid ICAs, patent bifurcation segments, patent proximal M1, with filling defects, stenosis and irregularity along the left M2 MCA frontotemporal branches with stenosis along the distal bilateral MCA CARINA MPC branches  - CTA neck showed tortuous, patent, CCAs, ECAs, ICAs w/ no  hemodynamically significant stenosis at  ICA origins. Bilateral vertebral arteries are patent without flow limiting stenosis  - MRI showed a tiny 2 mm focus of restricted diffusion posterior limb of the RIGHT internal capsule consistent with an acute lacunar infarction. There was mild periventricular and moderate deep and subcortical white matter ischemia & tiny old lacunar infarctions in the right corona radiata, bilateral thalami and posterior right periventricular white matter as well as the bilateral cerebellum  - Imaging consistent with possible embolic etiology  - TTE negative for PFO, EF  55 to 60% with left ventricular concentric remodeling w/ grade I diastolic dysfunction, Mild AS, AI, TR. Patient declined LYDIA  - no arrhythmic events of tele so far  - c/w ASA and atorvastatin   - PT/OT/Speech evaluation  - cardio consult appreciated   - patient refused loop recorder as per cardio & when speaking w/ me     Diarrhea  - may be from Miralax - which has been stopped  - no leukocytosis, fevers or signs of infectious cause - will try a trial of imodium     HTN  - c/w amlodipine for sBP<130      HLD  - c/w Atorvastatin     Claudication    - c/w Pletal     fibromyalgia  - c/w gabapentin and PRN Tylenol     PreDM  - Hgb A1C is 6.2  - patient counselled on lifestyle modifications      Prophylaxis:  DVT: Lovenox  GI: PO diet    Discharge to rehab if diarrhea resolves tomorrow.

## 2021-11-29 NOTE — PROGRESS NOTE ADULT - REASON FOR ADMISSION
slurry speech-CVA

## 2021-11-29 NOTE — PROGRESS NOTE ADULT - PROVIDER SPECIALTY LIST ADULT
Hospitalist
Internal Medicine
Hospitalist
Hospitalist
Neurology
Cardiology
Cardiology
Hospitalist
Hospitalist

## 2021-11-30 ENCOUNTER — TRANSCRIPTION ENCOUNTER (OUTPATIENT)
Age: 86
End: 2021-11-30

## 2021-11-30 VITALS
RESPIRATION RATE: 16 BRPM | HEART RATE: 98 BPM | TEMPERATURE: 99 F | SYSTOLIC BLOOD PRESSURE: 146 MMHG | OXYGEN SATURATION: 95 % | DIASTOLIC BLOOD PRESSURE: 87 MMHG

## 2021-11-30 LAB
ANION GAP SERPL CALC-SCNC: 7 MMOL/L — SIGNIFICANT CHANGE UP (ref 5–17)
BUN SERPL-MCNC: 20 MG/DL — SIGNIFICANT CHANGE UP (ref 7–23)
CALCIUM SERPL-MCNC: 8.5 MG/DL — SIGNIFICANT CHANGE UP (ref 8.5–10.1)
CHLORIDE SERPL-SCNC: 111 MMOL/L — HIGH (ref 96–108)
CO2 SERPL-SCNC: 23 MMOL/L — SIGNIFICANT CHANGE UP (ref 22–31)
CREAT SERPL-MCNC: 1 MG/DL — SIGNIFICANT CHANGE UP (ref 0.5–1.3)
GLUCOSE SERPL-MCNC: 81 MG/DL — SIGNIFICANT CHANGE UP (ref 70–99)
HCT VFR BLD CALC: 40.1 % — SIGNIFICANT CHANGE UP (ref 34.5–45)
HGB BLD-MCNC: 12.7 G/DL — SIGNIFICANT CHANGE UP (ref 11.5–15.5)
MAGNESIUM SERPL-MCNC: 2.1 MG/DL — SIGNIFICANT CHANGE UP (ref 1.6–2.6)
MCHC RBC-ENTMCNC: 27.5 PG — SIGNIFICANT CHANGE UP (ref 27–34)
MCHC RBC-ENTMCNC: 31.7 GM/DL — LOW (ref 32–36)
MCV RBC AUTO: 86.8 FL — SIGNIFICANT CHANGE UP (ref 80–100)
NRBC # BLD: 0 /100 WBCS — SIGNIFICANT CHANGE UP (ref 0–0)
PHOSPHATE SERPL-MCNC: 2.7 MG/DL — SIGNIFICANT CHANGE UP (ref 2.5–4.5)
PLATELET # BLD AUTO: 286 K/UL — SIGNIFICANT CHANGE UP (ref 150–400)
POTASSIUM SERPL-MCNC: 4.3 MMOL/L — SIGNIFICANT CHANGE UP (ref 3.5–5.3)
POTASSIUM SERPL-SCNC: 4.3 MMOL/L — SIGNIFICANT CHANGE UP (ref 3.5–5.3)
RBC # BLD: 4.62 M/UL — SIGNIFICANT CHANGE UP (ref 3.8–5.2)
RBC # FLD: 14.8 % — HIGH (ref 10.3–14.5)
SODIUM SERPL-SCNC: 141 MMOL/L — SIGNIFICANT CHANGE UP (ref 135–145)
WBC # BLD: 4.01 K/UL — SIGNIFICANT CHANGE UP (ref 3.8–10.5)
WBC # FLD AUTO: 4.01 K/UL — SIGNIFICANT CHANGE UP (ref 3.8–10.5)

## 2021-11-30 PROCEDURE — 99239 HOSP IP/OBS DSCHRG MGMT >30: CPT

## 2021-11-30 RX ORDER — BISOPROLOL FUMARATE AND HYDROCHLOROTHIAZIDE 5; 6.25 MG/1; MG/1
1 TABLET ORAL
Qty: 0 | Refills: 0 | DISCHARGE

## 2021-11-30 RX ORDER — GABAPENTIN 400 MG/1
1 CAPSULE ORAL
Qty: 0 | Refills: 0 | DISCHARGE
Start: 2021-11-30

## 2021-11-30 RX ORDER — ATORVASTATIN CALCIUM 80 MG/1
1 TABLET, FILM COATED ORAL
Qty: 0 | Refills: 0 | DISCHARGE

## 2021-11-30 RX ORDER — AMLODIPINE BESYLATE 2.5 MG/1
1 TABLET ORAL
Qty: 0 | Refills: 0 | DISCHARGE

## 2021-11-30 RX ORDER — AMLODIPINE BESYLATE 2.5 MG/1
1 TABLET ORAL
Qty: 0 | Refills: 0 | DISCHARGE
Start: 2021-11-30

## 2021-11-30 RX ORDER — ATORVASTATIN CALCIUM 80 MG/1
1 TABLET, FILM COATED ORAL
Qty: 0 | Refills: 0 | DISCHARGE
Start: 2021-11-30

## 2021-11-30 RX ORDER — NYSTATIN CREAM 100000 [USP'U]/G
1 CREAM TOPICAL
Qty: 0 | Refills: 0 | DISCHARGE
Start: 2021-11-30

## 2021-11-30 RX ORDER — LIDOCAINE 4 G/100G
1 CREAM TOPICAL
Qty: 0 | Refills: 0 | DISCHARGE
Start: 2021-11-30

## 2021-11-30 RX ADMIN — CILOSTAZOL 50 MILLIGRAM(S): 100 TABLET ORAL at 06:24

## 2021-11-30 RX ADMIN — NYSTATIN CREAM 1 APPLICATION(S): 100000 CREAM TOPICAL at 06:24

## 2021-11-30 RX ADMIN — CILOSTAZOL 50 MILLIGRAM(S): 100 TABLET ORAL at 17:28

## 2021-11-30 RX ADMIN — NYSTATIN CREAM 1 APPLICATION(S): 100000 CREAM TOPICAL at 18:00

## 2021-11-30 RX ADMIN — Medication 81 MILLIGRAM(S): at 12:36

## 2021-11-30 RX ADMIN — GABAPENTIN 300 MILLIGRAM(S): 400 CAPSULE ORAL at 06:24

## 2021-11-30 RX ADMIN — GABAPENTIN 300 MILLIGRAM(S): 400 CAPSULE ORAL at 15:03

## 2021-11-30 RX ADMIN — ENOXAPARIN SODIUM 40 MILLIGRAM(S): 100 INJECTION SUBCUTANEOUS at 12:36

## 2021-11-30 RX ADMIN — PANTOPRAZOLE SODIUM 40 MILLIGRAM(S): 20 TABLET, DELAYED RELEASE ORAL at 12:36

## 2021-11-30 RX ADMIN — AMLODIPINE BESYLATE 10 MILLIGRAM(S): 2.5 TABLET ORAL at 17:27

## 2021-11-30 NOTE — DISCHARGE NOTE PROVIDER - EXTENDED VTE YES NO FOR [facility] ASPIRIN
Ongoing SW/CM Assessment/Plan of Care Note     See SW/CM flowsheets for goals and other objective data.    Patient/Family discharge goal (s):  Goal #1: Psychosocial needs assessed    PT Recommendation:  Recommendation for Discharge: PT WI: Post acute therapy    OT Recommendation:  Recommendations for Discharge: OT WI: Intensive therapy program, Sub-acute nursing home    Disposition:  Planned Discharge Destination: Rehabilitation/Skilled Care    Progress note:   Chart reviewed and case discussed this date in OFTs. Aware IRP to evaluate pt this date. SW will wait to hear back from IRP to move forward with D/C planning. SW to follow.          ,

## 2021-11-30 NOTE — DIETITIAN INITIAL EVALUATION ADULT. - PERTINENT MEDS FT
MEDICATIONS  (STANDING):  amLODIPine   Tablet 10 milliGRAM(s) Oral daily  aspirin enteric coated 81 milliGRAM(s) Oral daily  atorvastatin 80 milliGRAM(s) Oral at bedtime  cilostazol 50 milliGRAM(s) Oral two times a day  enoxaparin Injectable 40 milliGRAM(s) SubCutaneous daily  gabapentin 300 milliGRAM(s) Oral three times a day  lidocaine   4% Patch 1 Patch Transdermal daily  nystatin Cream 1 Application(s) Topical two times a day  pantoprazole    Tablet 40 milliGRAM(s) Oral daily    MEDICATIONS  (PRN):  acetaminophen     Tablet .. 650 milliGRAM(s) Oral every 6 hours PRN Temp greater or equal to 38C (100.4F), Mild Pain (1 - 3), Moderate Pain (4 - 6)  melatonin 3 milliGRAM(s) Oral at bedtime PRN Insomnia  ondansetron Injectable 4 milliGRAM(s) IV Push every 8 hours PRN Nausea and/or Vomiting

## 2021-11-30 NOTE — DISCHARGE NOTE NURSING/CASE MANAGEMENT/SOCIAL WORK - NSDCPEFALRISK_GEN_ALL_CORE
patient BIBA from home. recent diagnosis of left leg DVT complains of left leg pain. patient taking xarelto. denies chest pain, sob
For information on Fall & Injury Prevention, visit: [email or web address] OR  [email or web address] OR  https://www.cdc.gov/steadi/patient.html

## 2021-11-30 NOTE — DISCHARGE NOTE PROVIDER - HOSPITAL COURSE
89 years old female with history of HTN, HLD, OA, fibromyalgia, CVA ( 2016 and 2020), Neuropathy s/p bilateral hip replacement was admitted for acute CVA. Pt was outside of tPA window & not candidate fro mechanical thrombectomy as per the H&P. CT head showed volume loss w/ unchanged remote right centrum semiovale lacunar infarct, interval more pronounced decreased white matter attenuation, likely microvascular disease, with remote and age indeterminate lacunar infarcts, slightly more pronounced decreased attenuation left inferior lateral temporal lobe with redemonstration bony irregularity, arachnoid granulations along the middle cranial fossa with inferolateral displacement of the temporal lobe into a prominent left inferolateral arachnoid granulation which can be associated with new ischemia and unchanged globus pallidus mineralization, no new hemorrhage or shift. CTA showed atherosclerotic vascular calcification multifocal stenosis cavernous, supraclinoid ICAs, patent bifurcation segments, patent proximal M1, with filling defects, stenosis and irregularity along the left M2 MCA frontotemporal branches with stenosis along the distal bilateral MCA CARINA MPC branches. CTA neck showed tortuous, patent, CCAs, ECAs, ICAs w/ no hemodynamically significant stenosis at ICA origins. Bilateral vertebral arteries were patent without flow limiting stenosis. MRI showed a tiny 2 mm focus of restricted diffusion posterior limb of the RIGHT internal capsule consistent with an acute lacunar infarction. There was mild periventricular and moderate deep and subcortical white matter ischemia & tiny old lacunar infarctions in the right corona radiata, bilateral thalami and posterior right periventricular white matter as well as the bilateral cerebellum. Imaging was consistent with possible embolic etiology but TTE was negative for PFO, EF was 55 to 60% with left ventricular concentric remodeling w/ grade I diastolic dysfunction, Mild AS, AI, TR. Patient declined LYDIA. She was given ASA and atorvastatin. Cardio offered loop recorder but the patient refused. Her Hgb A1C was 6.2 and the patient was counselled on lifestyle modifications. On 11/28 she developed diarrhea. Her Miralax was stopped and her diarrhea has resolved.     Discharge time: 43 minutes

## 2021-11-30 NOTE — DISCHARGE NOTE NURSING/CASE MANAGEMENT/SOCIAL WORK - NSDCVIVACCINE_GEN_ALL_CORE_FT
influenza, injectable, quadrivalent, preservative free; 12-Oct-2017 15:24; Keyanna Dallas (RN); [employer]; [id number]; IntraMuscular; Deltoid Left.; 0.5 milliLiter(s); VIS (VIS Published: 07-Aug-2015, VIS Presented: 12-Oct-2017);   Tdap; 25-Dec-2019 20:38; Candice Orta (ESCOBAR); [employer]; [id number] (Exp. Date: 17-Sep-2021); IntraMuscular; Deltoid Left.; 0.5 milliLiter(s); VIS (VIS Published: 09-May-2013, VIS Presented: 25-Dec-2019);

## 2021-11-30 NOTE — DIETITIAN INITIAL EVALUATION ADULT. - OTHER INFO
Pt adm w/slurry speech- CVA. Met w/pt at bedside, pt reports disliking consistency of Soft/bite sized foods, even though pt endorses difficulty chewing due to poor dentition. Pt states she wants to be on a Regular diet and be able to choose her own foods which she can eat. Pt s/p Swallow evaluation (11/26) w/SLP recommendations for Easy-to chew w/mildly thick fluids (will change diet consistency to Easy-to-chew). Pt made aware of the change & is in agreement. Pt also made aware of SLP recommendations and advised to comply w/same for safe swallow. Pt denies N/V/D/C, states diarrhea has resolved. Pt denies difficulty swallowing. PTA pt lived alone and friends/neighbors helped w/food shopping. Pt did own cooking and prepared soft foods for ease of chewing. Pt reports UBW of 191# "a while ago". Diet education on Stroke Nutrition Therapy provided and emphasized importance of Low Sodium foods. Pt verbalized comprehension. Per chart D/C plan is to [facility]

## 2021-11-30 NOTE — DISCHARGE NOTE PROVIDER - NSDCMRMEDTOKEN_GEN_ALL_CORE_FT
acetaminophen 325 mg oral tablet: 2 tab(s) orally every 6 hours, As needed, Mild Pain (1 - 3)  amLODIPine 10 mg oral tablet: 1 tab(s) orally once a day  aspirin 81 mg oral tablet, chewable: 1 tab(s) orally once a day  atorvastatin 80 mg oral tablet: 1 tab(s) orally once a day (at bedtime)  Calcium 600+D oral tablet: orally once a day  cilostazol 50 mg oral tablet: 1 tab(s) orally 2 times a day  fluticasone nasal: nasal once a day, As Needed  gabapentin 300 mg oral capsule: 1 cap(s) orally 3 times a day   acetaminophen 325 mg oral tablet: 2 tab(s) orally every 6 hours, As needed, Mild Pain (1 - 3)  amLODIPine 10 mg oral tablet: 1 tab(s) orally once a day  aspirin 81 mg oral tablet, chewable: 1 tab(s) orally once a day  atorvastatin 80 mg oral tablet: 1 tab(s) orally once a day (at bedtime)  Calcium 600+D oral tablet: orally once a day  cilostazol 50 mg oral tablet: 1 tab(s) orally 2 times a day  fluticasone nasal: nasal once a day, As Needed  gabapentin 300 mg oral capsule: 1 cap(s) orally 3 times a day  lidocaine 4% topical film: Apply topically to affected area once a day - can stay on skin up to 12hrs/day  nystatin 100,000 units/g topical cream: 1 application topically 2 times a day to affected area

## 2021-11-30 NOTE — DIETITIAN INITIAL EVALUATION ADULT. - PERTINENT LABORATORY DATA
11-30 Na141 mmol/L Glu 81 mg/dL K+ 4.3 mmol/L Cr  1.00 mg/dL BUN 20 mg/dL 11-30 Phos 2.7 mg/dL 11-24 Alb 2.8 g/dL<L> 11-24 Chol 173 mg/dL LDL --    HDL 60 mg/dL Trig 74 mg/dL    11-24-21 @ 11:54A1C 6.2

## 2021-11-30 NOTE — DISCHARGE NOTE NURSING/CASE MANAGEMENT/SOCIAL WORK - PATIENT PORTAL LINK FT
You can access the FollowMyHealth Patient Portal offered by [facility] by registering at the following website: [email or web address]. By joining [email or web address]’s FollowMyHealth portal, you will also be able to view your health information using other applications (apps) compatible with our system.

## 2021-12-06 DIAGNOSIS — Z20.822 CONTACT WITH AND (SUSPECTED) EXPOSURE TO COVID-19: ICD-10-CM

## 2021-12-06 DIAGNOSIS — I63.9 CEREBRAL INFARCTION, UNSPECIFIED: ICD-10-CM

## 2021-12-06 DIAGNOSIS — R73.03 PREDIABETES: ICD-10-CM

## 2021-12-06 DIAGNOSIS — Z79.02 LONG TERM (CURRENT) USE OF ANTITHROMBOTICS/ANTIPLATELETS: ICD-10-CM

## 2021-12-06 DIAGNOSIS — Z79.82 LONG TERM (CURRENT) USE OF ASPIRIN: ICD-10-CM

## 2021-12-06 DIAGNOSIS — Z96.643 PRESENCE OF ARTIFICIAL HIP JOINT, BILATERAL: ICD-10-CM

## 2021-12-06 DIAGNOSIS — I73.9 PERIPHERAL VASCULAR DISEASE, UNSPECIFIED: ICD-10-CM

## 2021-12-06 DIAGNOSIS — Z86.73 PERSONAL HISTORY OF TRANSIENT ISCHEMIC ATTACK (TIA), AND CEREBRAL INFARCTION WITHOUT RESIDUAL DEFICITS: ICD-10-CM

## 2021-12-06 DIAGNOSIS — M79.7 FIBROMYALGIA: ICD-10-CM

## 2021-12-06 DIAGNOSIS — I63.81 OTHER CEREBRAL INFARCTION DUE TO OCCLUSION OR STENOSIS OF SMALL ARTERY: ICD-10-CM

## 2021-12-06 DIAGNOSIS — M19.90 UNSPECIFIED OSTEOARTHRITIS, UNSPECIFIED SITE: ICD-10-CM

## 2021-12-06 DIAGNOSIS — K52.1 TOXIC GASTROENTERITIS AND COLITIS: ICD-10-CM

## 2021-12-06 DIAGNOSIS — T47.4X5A ADVERSE EFFECT OF OTHER LAXATIVES, INITIAL ENCOUNTER: ICD-10-CM

## 2021-12-06 DIAGNOSIS — Z79.899 OTHER LONG TERM (CURRENT) DRUG THERAPY: ICD-10-CM

## 2021-12-06 DIAGNOSIS — R29.706 NIHSS SCORE 6: ICD-10-CM

## 2021-12-06 DIAGNOSIS — G62.9 POLYNEUROPATHY, UNSPECIFIED: ICD-10-CM

## 2021-12-06 DIAGNOSIS — I10 ESSENTIAL (PRIMARY) HYPERTENSION: ICD-10-CM

## 2021-12-06 DIAGNOSIS — E78.5 HYPERLIPIDEMIA, UNSPECIFIED: ICD-10-CM

## 2021-12-14 NOTE — DIETITIAN INITIAL EVALUATION ADULT. - HEIGHT FOR BMI (FEET)
Tazorac Pregnancy And Lactation Text: This medication is not safe during pregnancy. It is unknown if this medication is excreted in breast milk. Minocycline Counseling: Patient advised regarding possible photosensitivity and discoloration of the teeth, skin, lips, tongue and gums.  Patient instructed to avoid sunlight, if possible.  When exposed to sunlight, patients should wear protective clothing, sunglasses, and sunscreen.  The patient was instructed to call the office immediately if the following severe adverse effects occur:  hearing changes, easy bruising/bleeding, severe headache, or vision changes.  The patient verbalized understanding of the proper use and possible adverse effects of minocycline.  All of the patient's questions and concerns were addressed. Spironolactone Counseling: Patient advised regarding risks of diarrhea, abdominal pain, hyperkalemia, birth defects (for female patients), liver toxicity and renal toxicity. The patient may need blood work to monitor liver and kidney function and potassium levels while on therapy. The patient verbalized understanding of the proper use and possible adverse effects of spironolactone.  All of the patient's questions and concerns were addressed. Doxycycline Counseling:  Patient counseled regarding possible photosensitivity and increased risk for sunburn.  Patient instructed to avoid sunlight, if possible.  When exposed to sunlight, patients should wear protective clothing, sunglasses, and sunscreen.  The patient was instructed to call the office immediately if the following severe adverse effects occur:  hearing changes, easy bruising/bleeding, severe headache, or vision changes.  The patient verbalized understanding of the proper use and possible adverse effects of doxycycline.  All of the patient's questions and concerns were addressed. Dapsone Pregnancy And Lactation Text: This medication is Pregnancy Category C and is not considered safe during pregnancy or breast feeding. Spironolactone Pregnancy And Lactation Text: This medication can cause feminization of the male fetus and should be avoided during pregnancy. The active metabolite is also found in breast milk. Benzoyl Peroxide Pregnancy And Lactation Text: This medication is Pregnancy Category C. It is unknown if benzoyl peroxide is excreted in breast milk. Isotretinoin Counseling: Patient should get monthly blood tests, not donate blood, not drive at night if vision affected, not share medication, and not undergo elective surgery for 6 months after tx completed. Side effects reviewed, pt to contact office should one occur. Bactrim Pregnancy And Lactation Text: This medication is Pregnancy Category D and is known to cause fetal risk.  It is also excreted in breast milk. Topical Retinoid counseling:  Patient advised to apply a pea-sized amount only at bedtime and wait 30 minutes after washing their face before applying.  If too drying, patient may add a non-comedogenic moisturizer. The patient verbalized understanding of the proper use and possible adverse effects of retinoids.  All of the patient's questions and concerns were addressed. Isotretinoin Pregnancy And Lactation Text: This medication is Pregnancy Category X and is considered extremely dangerous during pregnancy. It is unknown if it is excreted in breast milk. Minocycline Pregnancy And Lactation Text: This medication is Pregnancy Category D and not consider safe during pregnancy. It is also excreted in breast milk. 5 Topical Clindamycin Counseling: Patient counseled that this medication may cause skin irritation or allergic reactions.  In the event of skin irritation, the patient was advised to reduce the amount of the drug applied or use it less frequently.   The patient verbalized understanding of the proper use and possible adverse effects of clindamycin.  All of the patient's questions and concerns were addressed. Azithromycin Counseling:  I discussed with the patient the risks of azithromycin including but not limited to GI upset, allergic reaction, drug rash, diarrhea, and yeast infections. Topical Clindamycin Pregnancy And Lactation Text: This medication is Pregnancy Category B and is considered safe during pregnancy. It is unknown if it is excreted in breast milk. Sarecycline Counseling: Patient advised regarding possible photosensitivity and discoloration of the teeth, skin, lips, tongue and gums.  Patient instructed to avoid sunlight, if possible.  When exposed to sunlight, patients should wear protective clothing, sunglasses, and sunscreen.  The patient was instructed to call the office immediately if the following severe adverse effects occur:  hearing changes, easy bruising/bleeding, severe headache, or vision changes.  The patient verbalized understanding of the proper use and possible adverse effects of sarecycline.  All of the patient's questions and concerns were addressed. Birth Control Pills Counseling: Birth Control Pill Counseling: I discussed with the patient the potential side effects of OCPs including but not limited to increased risk of stroke, heart attack, thrombophlebitis, deep venous thrombosis, hepatic adenomas, breast changes, GI upset, headaches, and depression.  The patient verbalized understanding of the proper use and possible adverse effects of OCPs. All of the patient's questions and concerns were addressed. Tetracycline Counseling: Patient counseled regarding possible photosensitivity and increased risk for sunburn.  Patient instructed to avoid sunlight, if possible.  When exposed to sunlight, patients should wear protective clothing, sunglasses, and sunscreen.  The patient was instructed to call the office immediately if the following severe adverse effects occur:  hearing changes, easy bruising/bleeding, severe headache, or vision changes.  The patient verbalized understanding of the proper use and possible adverse effects of tetracycline.  All of the patient's questions and concerns were addressed. Patient understands to avoid pregnancy while on therapy due to potential birth defects. Doxycycline Pregnancy And Lactation Text: This medication is Pregnancy Category D and not consider safe during pregnancy. It is also excreted in breast milk but is considered safe for shorter treatment courses. High Dose Vitamin A Counseling: Side effects reviewed, pt to contact office should one occur. Dapsone Counseling: I discussed with the patient the risks of dapsone including but not limited to hemolytic anemia, agranulocytosis, rashes, methemoglobinemia, kidney failure, peripheral neuropathy, headaches, GI upset, and liver toxicity.  Patients who start dapsone require monitoring including baseline LFTs and weekly CBCs for the first month, then every month thereafter.  The patient verbalized understanding of the proper use and possible adverse effects of dapsone.  All of the patient's questions and concerns were addressed. Use Enhanced Medication Counseling?: No Topical Retinoid Pregnancy And Lactation Text: This medication is Pregnancy Category C. It is unknown if this medication is excreted in breast milk. Birth Control Pills Pregnancy And Lactation Text: This medication should be avoided if pregnant and for the first 30 days post-partum. Azithromycin Pregnancy And Lactation Text: This medication is considered safe during pregnancy and is also secreted in breast milk. Erythromycin Counseling:  I discussed with the patient the risks of erythromycin including but not limited to GI upset, allergic reaction, drug rash, diarrhea, increase in liver enzymes, and yeast infections. Tazorac Counseling:  Patient advised that medication is irritating and drying.  Patient may need to apply sparingly and wash off after an hour before eventually leaving it on overnight.  The patient verbalized understanding of the proper use and possible adverse effects of tazorac.  All of the patient's questions and concerns were addressed. Erythromycin Pregnancy And Lactation Text: This medication is Pregnancy Category B and is considered safe during pregnancy. It is also excreted in breast milk. Topical Sulfur Applications Counseling: Topical Sulfur Counseling: Patient counseled that this medication may cause skin irritation or allergic reactions.  In the event of skin irritation, the patient was advised to reduce the amount of the drug applied or use it less frequently.   The patient verbalized understanding of the proper use and possible adverse effects of topical sulfur application.  All of the patient's questions and concerns were addressed. High Dose Vitamin A Pregnancy And Lactation Text: High dose vitamin A therapy is contraindicated during pregnancy and breast feeding. Topical Sulfur Applications Pregnancy And Lactation Text: This medication is Pregnancy Category C and has an unknown safety profile during pregnancy. It is unknown if this topical medication is excreted in breast milk. Bactrim Counseling:  I discussed with the patient the risks of sulfa antibiotics including but not limited to GI upset, allergic reaction, drug rash, diarrhea, dizziness, photosensitivity, and yeast infections.  Rarely, more serious reactions can occur including but not limited to aplastic anemia, agranulocytosis, methemoglobinemia, blood dyscrasias, liver or kidney failure, lung infiltrates or desquamative/blistering drug rashes. Detail Level: Zone Benzoyl Peroxide Counseling: Patient counseled that medicine may cause skin irritation and bleach clothing.  In the event of skin irritation, the patient was advised to reduce the amount of the drug applied or use it less frequently.   The patient verbalized understanding of the proper use and possible adverse effects of benzoyl peroxide.  All of the patient's questions and concerns were addressed.

## 2022-01-01 NOTE — PROGRESS NOTE ADULT - PROBLEM/PLAN-1
PATIENT INFORMATION  Follow-Up  - Return for your 2 week well child visit. First  Visit - After Visit Summary - Adapted from [facility] and the [employer]    Anticipatory guidance discussed. Ann Solo is doing great. He is over birthweight  Continue feeding on demand  Give Vit D 10 mcg daily    Parental and Family Well Being is Important for Infant Health  Â· Call your pediatrician or obstetrician if you feel sad, blue or overwhelmed for more than a few days. Â· If your baby is crying, and you feel yourself losing control, secure your baby either in a car seat or crib and to go to another room to calm down. It can be very stressful dealing with a crying infant and please know it is normal to need that few moments to gather your strength and return to the baby. This is a good time to ask trusted friends and family to hold/soothe the baby while you take some time to yourself. Â· Never hesitate to ask for help from family and friends. Â· Give your other children small, safe ways to help you with the baby. Â· Spend special time alone with each child whenever possible. Feeding Your Baby  Â· Feed only breast milk or iron-fortified formula, no water or cowâs milk. Â· Feed when your baby is hungry. Some cues that show you he/she is hungry include: putting hand to mouth, sucking or rooting, fussing. Â· Stop feeding when your baby is full. Signs that he/she is full include: turning away, closing mouth, relaxing hands. Â· If breastfeeding: breastfeed 8-12 times per day, make sure your baby has 6-8 wet diapers a day, try to wait until 14 weeks of age to use a pacifier to avoid nipple confusion. Let us know if you would like a referral to a lactation consultant to help with breastfeeding. Â· If formula feeding: offer your baby 2 ounces every 2-3 hours, more if still hungry. Hold your baby so you can look at each other while feeding. Do not prop bottle.       Baby Care  Â· Use a rectal thermometer, not an ear or skin thermometer. Â· A true fever is when the rectal thermometer reads 100.4 degrees F or higher  Â· In babies 1months of age and younger, fevers are serious. Call us if your baby has a temperature of 100.4 or higher. Â· Have everyone who touches the baby wash their hands first.  Â· Avoid crowds. Â· Keep your baby out of the sun; use sunscreen only if there is no shade. Â· Avoid people with colds and flu whenever possible. Â· Keep your babyâs umbilical cord clean and dry - keep the diaper below the cord until it falls off in 10-14 days and call us if it becomes red, if there is fluid in the area or if it smells. Getting to Know Your Baby  Â· Get to know each other by holding and touching your baby. Â· Talk to your baby often. Â· Let your baby see your face and eyes. Â· Learn what calms your baby, such as rocking or stroking. Safety  Â· Put your baby to sleep on his or her back in a safe crib, in your room, and not in your bed. Either swaddle your baby or use tightly tucked blankets. Do not use loose, soft bedding or toys in the crib. Use a crib with slats that are 2 and 3/8 inches apart or less. Â· Use a rear-facing car safety seat in the middle of the back seat in all vehicles  Â· Never put your baby in a seat with a passenger air bag  Â· Keep a hand on your baby when changing diapers and clothes    Health and Safety Tips  Parent Education from Healthy Parent    EducaciÃ³n para padres sobre niÃ±os sanos    Common dosing for acetaminophen:   Acetaminophen (Tylenol) can be given every 4 hours. Â· Infant Drops: 160mg/5ml for  Weight 6-11 lbs   Dose 1.25 ml     Additional Educational Resources: For additional resources regarding your symptoms, diagnosis, or further health information, please visit the [facility] section in [address] [place] [address]. DISPLAY PLAN FREE TEXT

## 2022-08-08 NOTE — ED PROVIDER NOTE - TEMPLATE, MLM
General Electrodesiccation And Curettage Text: The wound bed was treated with electrodesiccation and curettage after the biopsy was performed.

## 2022-09-17 ENCOUNTER — INPATIENT (INPATIENT)
Facility: HOSPITAL | Age: 87
LOS: 3 days | Discharge: NURSING, REHAB OR LONG-TERM CARE | End: 2022-09-21
Attending: INTERNAL MEDICINE | Admitting: INTERNAL MEDICINE

## 2022-09-17 VITALS
RESPIRATION RATE: 19 BRPM | HEIGHT: 66 IN | WEIGHT: 182.1 LBS | OXYGEN SATURATION: 97 % | SYSTOLIC BLOOD PRESSURE: 140 MMHG | DIASTOLIC BLOOD PRESSURE: 63 MMHG | HEART RATE: 71 BPM | TEMPERATURE: 98 F

## 2022-09-17 DIAGNOSIS — Z86.718 PERSONAL HISTORY OF OTHER VENOUS THROMBOSIS AND EMBOLISM: ICD-10-CM

## 2022-09-17 DIAGNOSIS — Z86.73 PERSONAL HISTORY OF TRANSIENT ISCHEMIC ATTACK (TIA), AND CEREBRAL INFARCTION WITHOUT RESIDUAL DEFICITS: ICD-10-CM

## 2022-09-17 DIAGNOSIS — I10 ESSENTIAL (PRIMARY) HYPERTENSION: ICD-10-CM

## 2022-09-17 DIAGNOSIS — M79.671 PAIN IN RIGHT FOOT: ICD-10-CM

## 2022-09-17 DIAGNOSIS — G62.9 POLYNEUROPATHY, UNSPECIFIED: ICD-10-CM

## 2022-09-17 DIAGNOSIS — M19.071 PRIMARY OSTEOARTHRITIS, RIGHT ANKLE AND FOOT: ICD-10-CM

## 2022-09-17 DIAGNOSIS — R26.2 DIFFICULTY IN WALKING, NOT ELSEWHERE CLASSIFIED: ICD-10-CM

## 2022-09-17 LAB
ALBUMIN SERPL ELPH-MCNC: 3.3 G/DL — SIGNIFICANT CHANGE UP (ref 3.3–5)
ALP SERPL-CCNC: 70 U/L — SIGNIFICANT CHANGE UP (ref 40–120)
ALT FLD-CCNC: 15 U/L — SIGNIFICANT CHANGE UP (ref 12–78)
ANION GAP SERPL CALC-SCNC: 6 MMOL/L — SIGNIFICANT CHANGE UP (ref 5–17)
APTT BLD: 26.3 SEC — LOW (ref 27.5–35.5)
AST SERPL-CCNC: 23 U/L — SIGNIFICANT CHANGE UP (ref 15–37)
BASOPHILS # BLD AUTO: 0.03 K/UL — SIGNIFICANT CHANGE UP (ref 0–0.2)
BASOPHILS NFR BLD AUTO: 0.4 % — SIGNIFICANT CHANGE UP (ref 0–2)
BILIRUB SERPL-MCNC: 0.4 MG/DL — SIGNIFICANT CHANGE UP (ref 0.2–1.2)
BUN SERPL-MCNC: 22 MG/DL — SIGNIFICANT CHANGE UP (ref 7–23)
CALCIUM SERPL-MCNC: 9.1 MG/DL — SIGNIFICANT CHANGE UP (ref 8.5–10.1)
CHLORIDE SERPL-SCNC: 111 MMOL/L — HIGH (ref 96–108)
CO2 SERPL-SCNC: 26 MMOL/L — SIGNIFICANT CHANGE UP (ref 22–31)
CREAT SERPL-MCNC: 1.35 MG/DL — HIGH (ref 0.5–1.3)
EGFR: 37 ML/MIN/1.73M2 — LOW
EOSINOPHIL # BLD AUTO: 0.13 K/UL — SIGNIFICANT CHANGE UP (ref 0–0.5)
EOSINOPHIL NFR BLD AUTO: 1.8 % — SIGNIFICANT CHANGE UP (ref 0–6)
ERYTHROCYTE [SEDIMENTATION RATE] IN BLOOD: 11 MM/HR — SIGNIFICANT CHANGE UP (ref 0–20)
FLUAV AG NPH QL: SIGNIFICANT CHANGE UP
FLUBV AG NPH QL: SIGNIFICANT CHANGE UP
GLUCOSE SERPL-MCNC: 64 MG/DL — LOW (ref 70–99)
HCT VFR BLD CALC: 37.9 % — SIGNIFICANT CHANGE UP (ref 34.5–45)
HGB BLD-MCNC: 12.1 G/DL — SIGNIFICANT CHANGE UP (ref 11.5–15.5)
IMM GRANULOCYTES NFR BLD AUTO: 0.3 % — SIGNIFICANT CHANGE UP (ref 0–0.9)
INR BLD: 1.03 RATIO — SIGNIFICANT CHANGE UP (ref 0.88–1.16)
LYMPHOCYTES # BLD AUTO: 2.41 K/UL — SIGNIFICANT CHANGE UP (ref 1–3.3)
LYMPHOCYTES # BLD AUTO: 34 % — SIGNIFICANT CHANGE UP (ref 13–44)
MAGNESIUM SERPL-MCNC: 2.5 MG/DL — SIGNIFICANT CHANGE UP (ref 1.6–2.6)
MCHC RBC-ENTMCNC: 27.6 PG — SIGNIFICANT CHANGE UP (ref 27–34)
MCHC RBC-ENTMCNC: 31.9 G/DL — LOW (ref 32–36)
MCV RBC AUTO: 86.3 FL — SIGNIFICANT CHANGE UP (ref 80–100)
MONOCYTES # BLD AUTO: 0.79 K/UL — SIGNIFICANT CHANGE UP (ref 0–0.9)
MONOCYTES NFR BLD AUTO: 11.1 % — SIGNIFICANT CHANGE UP (ref 2–14)
NEUTROPHILS # BLD AUTO: 3.71 K/UL — SIGNIFICANT CHANGE UP (ref 1.8–7.4)
NEUTROPHILS NFR BLD AUTO: 52.4 % — SIGNIFICANT CHANGE UP (ref 43–77)
NRBC # BLD: 0 /100 WBCS — SIGNIFICANT CHANGE UP (ref 0–0)
PHOSPHATE SERPL-MCNC: 3.5 MG/DL — SIGNIFICANT CHANGE UP (ref 2.5–4.5)
PLATELET # BLD AUTO: 245 K/UL — SIGNIFICANT CHANGE UP (ref 150–400)
POTASSIUM SERPL-MCNC: 4.1 MMOL/L — SIGNIFICANT CHANGE UP (ref 3.5–5.3)
POTASSIUM SERPL-SCNC: 4.1 MMOL/L — SIGNIFICANT CHANGE UP (ref 3.5–5.3)
PROT SERPL-MCNC: 7.8 GM/DL — SIGNIFICANT CHANGE UP (ref 6–8.3)
PROTHROM AB SERPL-ACNC: 12.4 SEC — SIGNIFICANT CHANGE UP (ref 10.5–13.4)
RBC # BLD: 4.39 M/UL — SIGNIFICANT CHANGE UP (ref 3.8–5.2)
RBC # FLD: 14.8 % — HIGH (ref 10.3–14.5)
SARS-COV-2 RNA SPEC QL NAA+PROBE: SIGNIFICANT CHANGE UP
SODIUM SERPL-SCNC: 143 MMOL/L — SIGNIFICANT CHANGE UP (ref 135–145)
WBC # BLD: 7.09 K/UL — SIGNIFICANT CHANGE UP (ref 3.8–10.5)
WBC # FLD AUTO: 7.09 K/UL — SIGNIFICANT CHANGE UP (ref 3.8–10.5)

## 2022-09-17 PROCEDURE — 93010 ELECTROCARDIOGRAM REPORT: CPT

## 2022-09-17 PROCEDURE — 73610 X-RAY EXAM OF ANKLE: CPT | Mod: 26,RT

## 2022-09-17 PROCEDURE — 73630 X-RAY EXAM OF FOOT: CPT | Mod: 26,RT

## 2022-09-17 PROCEDURE — 93971 EXTREMITY STUDY: CPT | Mod: 26,RT

## 2022-09-17 PROCEDURE — 99285 EMERGENCY DEPT VISIT HI MDM: CPT

## 2022-09-17 PROCEDURE — 99223 1ST HOSP IP/OBS HIGH 75: CPT

## 2022-09-17 RX ORDER — AMLODIPINE BESYLATE 2.5 MG/1
10 TABLET ORAL DAILY
Refills: 0 | Status: DISCONTINUED | OUTPATIENT
Start: 2022-09-17 | End: 2022-09-21

## 2022-09-17 RX ORDER — FLUTICASONE PROPIONATE 50 MCG
1 SPRAY, SUSPENSION NASAL
Refills: 0 | Status: DISCONTINUED | OUTPATIENT
Start: 2022-09-17 | End: 2022-09-21

## 2022-09-17 RX ORDER — ASPIRIN/CALCIUM CARB/MAGNESIUM 324 MG
81 TABLET ORAL DAILY
Refills: 0 | Status: DISCONTINUED | OUTPATIENT
Start: 2022-09-17 | End: 2022-09-21

## 2022-09-17 RX ORDER — SODIUM CHLORIDE 9 MG/ML
1000 INJECTION INTRAMUSCULAR; INTRAVENOUS; SUBCUTANEOUS
Refills: 0 | Status: DISCONTINUED | OUTPATIENT
Start: 2022-09-17 | End: 2022-09-18

## 2022-09-17 RX ORDER — GABAPENTIN 400 MG/1
300 CAPSULE ORAL THREE TIMES A DAY
Refills: 0 | Status: DISCONTINUED | OUTPATIENT
Start: 2022-09-17 | End: 2022-09-21

## 2022-09-17 RX ORDER — ACETAMINOPHEN 500 MG
650 TABLET ORAL EVERY 6 HOURS
Refills: 0 | Status: DISCONTINUED | OUTPATIENT
Start: 2022-09-17 | End: 2022-09-21

## 2022-09-17 RX ORDER — ONDANSETRON 8 MG/1
4 TABLET, FILM COATED ORAL EVERY 8 HOURS
Refills: 0 | Status: DISCONTINUED | OUTPATIENT
Start: 2022-09-17 | End: 2022-09-21

## 2022-09-17 RX ORDER — LANOLIN ALCOHOL/MO/W.PET/CERES
3 CREAM (GRAM) TOPICAL AT BEDTIME
Refills: 0 | Status: DISCONTINUED | OUTPATIENT
Start: 2022-09-17 | End: 2022-09-21

## 2022-09-17 RX ORDER — ENOXAPARIN SODIUM 100 MG/ML
40 INJECTION SUBCUTANEOUS EVERY 24 HOURS
Refills: 0 | Status: DISCONTINUED | OUTPATIENT
Start: 2022-09-17 | End: 2022-09-21

## 2022-09-17 RX ORDER — TRAMADOL HYDROCHLORIDE 50 MG/1
50 TABLET ORAL EVERY 4 HOURS
Refills: 0 | Status: DISCONTINUED | OUTPATIENT
Start: 2022-09-17 | End: 2022-09-21

## 2022-09-17 RX ORDER — ACETAMINOPHEN 500 MG
1000 TABLET ORAL ONCE
Refills: 0 | Status: COMPLETED | OUTPATIENT
Start: 2022-09-17 | End: 2022-09-17

## 2022-09-17 RX ORDER — ATORVASTATIN CALCIUM 80 MG/1
80 TABLET, FILM COATED ORAL AT BEDTIME
Refills: 0 | Status: DISCONTINUED | OUTPATIENT
Start: 2022-09-17 | End: 2022-09-21

## 2022-09-17 RX ADMIN — ENOXAPARIN SODIUM 40 MILLIGRAM(S): 100 INJECTION SUBCUTANEOUS at 22:38

## 2022-09-17 RX ADMIN — Medication 1000 MILLIGRAM(S): at 19:01

## 2022-09-17 RX ADMIN — SODIUM CHLORIDE 75 MILLILITER(S): 9 INJECTION INTRAMUSCULAR; INTRAVENOUS; SUBCUTANEOUS at 23:31

## 2022-09-17 RX ADMIN — Medication 1000 MILLIGRAM(S): at 19:02

## 2022-09-17 RX ADMIN — Medication 400 MILLIGRAM(S): at 17:01

## 2022-09-17 RX ADMIN — GABAPENTIN 300 MILLIGRAM(S): 400 CAPSULE ORAL at 23:31

## 2022-09-17 RX ADMIN — ATORVASTATIN CALCIUM 80 MILLIGRAM(S): 80 TABLET, FILM COATED ORAL at 22:38

## 2022-09-17 RX ADMIN — TRAMADOL HYDROCHLORIDE 50 MILLIGRAM(S): 50 TABLET ORAL at 22:08

## 2022-09-17 NOTE — H&P ADULT - ASSESSMENT
This is a 89-yo F with hx HTN, CVA, neuropathy, presenting due to R foot and ankle pain.  Pain started this AM, present only with ambulation and radiates up right leg to the thigh, it is so severe she is unable to weight bear on it. Tylenol did not provide relief. denies inciting events/trauma. Had DVT in the past unclear which leg, no longer on AC. ambulates with walker at baseline. Duplex (-) dvt, XR (-) for fx. vitals unremarkable, creat 1.35 was 1 a year ago. denies hx gout, chest pain, shortness of breath, numbness, tingling.     Impaired ambulation   R ankle pain due to OA vs neuropathy   liset vs ckd  htn   hx cva  hx dvt  -pain management with tylenol and ultram  -IVF   -PT, social work consult for possible rehab  -resume home meds  -Lovenox ppx, low na diet

## 2022-09-17 NOTE — PATIENT PROFILE ADULT - FUNCTIONAL ASSESSMENT - DAILY ACTIVITY SCORE.
53 year old male who denies any significant PMH presenting to ED due to syncope episode x 3-4 times with fall - otherwise complaining of R knee pain and pain  on flexion. States has had no prior episode like this in past. Denies any recent chest pain, no focal weakness and no recent headache. 18 no

## 2022-09-17 NOTE — ED ADULT TRIAGE NOTE - CHIEF COMPLAINT QUOTE
patient BIBA c/o of R ankle pain and swelling started today, denied chest pain no difficulty breathing no calf pain  , no other pain a the time of triage

## 2022-09-17 NOTE — H&P ADULT - HISTORY OF PRESENT ILLNESS
This is a 89-yo F with hx HTN, CVA, neuropathy, presenting due to R foot and ankle pain.  Pain started this AM, present only with ambulation and radiates up right leg to the thigh, it is so severe she is unable to weight bear on it. Tylenol did not provide relief. denies inciting events/trauma. Had DVT in the past unclear which leg, no longer on AC. ambulates with walker at baseline. Duplex (-) dvt, XR (-) for fx. vitals unremarkable, creat 1.35 was 1 a year ago. denies hx gout, chest pain, shortness of breath, numbness, tingling.

## 2022-09-17 NOTE — H&P ADULT - NSHPPHYSICALEXAM_GEN_ALL_CORE
Constitutional: NAD AAO   HEENT PERRLA EOMI  CV RRR S1S2  Pulm CTA b/l   GI soft nontender nondistended + BS   Neuro CN II-XII grossly intact   Extremities no edema or calf tenderness. R  to palpation/burning pain, no edema opr erythema, no lesions

## 2022-09-17 NOTE — PATIENT PROFILE ADULT - PATIENT REPRESENTATIVE: ( YOU CAN CHOOSE ANY PERSON THAT CAN ASSIST YOU WITH YOUR HEALTH CARE PREFERENCES, DOES NOT HAVE TO BE A SPOUSE, IMMEDIATE FAMILY OR SIGNIFICANT OTHER/PARTNER)
Just a guess but maybe about $400  
Patient has an appt to see Dr. Corley on 7/1.  He doesn't have insurance that he wants to submit this too.  He disagrees with the referral diagnosis.  He wants to know how much this will cost him.  I told him that there is a $100.00 fee due at time of the appt without insurance.  He wants to know what the total cost of this will be.  Please call and discuss.  
Patient notified of cost and states that he will think it over and will make some calls. He will keep scheduled appointment time and call back if he is wanting to cancel the appointment.  
yes

## 2022-09-17 NOTE — ED PROVIDER NOTE - CLINICAL SUMMARY MEDICAL DECISION MAKING FREE TEXT BOX
89-year-old female with past medical history of hypertension, CVA, neuropathy presents the emergency room for ankle pain.  Reports atraumatic right foot and ankle pain that radiates up right leg to the thigh that began this morning when she woke up.  Denies chest pain, shortness of breath, numbness, tingling.  States that she took 2 of 650 mg Tylenol at 7 AM with some relief of pain.  Patient denies pain while sitting, only pain with ambulation or with palpation.  Patient has had a DVT in the past, no longer on blood thinners. Vital signs stable, significant tenderness to right distal LE without evidence of deformity, neurovascularly intact. Concern for DVT vs arthritis, less likely fracture or dislocation - will get labs, meds, XR, US, reassess. Likely admit due to inability to ambulate.

## 2022-09-17 NOTE — ED ADULT NURSE NOTE - OBJECTIVE STATEMENT
Pt AOx4, responsive, non-ambulatory at this time. c/o severe pain of right ankle & swelling since this morning. Pt denies CP, cough, SOB, denies trauma. Pedal pulse present, limited ROM of right ankle & foot. PMH of carpal tunnel, arthritis, Pt AOx4, responsive, non-ambulatory at this time. c/o severe pain of right ankle & swelling since this morning. Pt denies CP, cough, SOB, denies trauma. Pedal pulse present, limited ROM of right ankle & foot. PMH of carpal tunnel, arthritis of bilateral hips, fibromyalgia, gallstones, HTN, HLD, CHF.

## 2022-09-17 NOTE — ED PROVIDER NOTE - CHIEF COMPLAINT
HPI:Has not been seen for 2 yrs due to pandemic.  Not checking bp or bs at home, out of supplies.  No polyuria, polydipsia, hypoglycemia.  Taking metformin 1000 am and 500 pm.      Does not check bp at home and on no antihypertensives.    Walks daily couple of miles without exertional symptoms,    Patient's medications, allergies, past medical, surgical, social and family histories were reviewed and updated as noted in the chart.    ROS:  Review of Systems   Constitutional: Negative for chills and fever.   Respiratory: Negative for cough and shortness of breath.    Cardiovascular: Negative for chest pain and palpitations.   Gastrointestinal: Negative for abdominal pain, nausea and vomiting.   Genitourinary: Negative.  Negative for dysuria, frequency and hematuria.       Objective:  Visit Vitals  /70   Pulse 72   Temp 96.2 °F (35.7 °C) (Temporal)   Ht 5' 5\" (1.651 m)   Wt 57.2 kg (126 lb)   BMI 20.97 kg/m²     Physical Exam  Neck:      Vascular: No JVD.   Cardiovascular:      Heart sounds: Normal heart sounds.   Pulmonary:      Breath sounds: Normal breath sounds.   Abdominal:      General: Bowel sounds are normal.      Tenderness: There is no abdominal tenderness. There is no guarding.   Musculoskeletal:         General: Normal range of motion.      Cervical back: Neck supple.   Skin:     General: Skin is dry.   Neurological:      General: No focal deficit present.           Assessment/Plan:  Problem List Items Addressed This Visit        Cardiac and Vasculature    Elevated blood pressure reading    Hyperlipidemia    Relevant Medications    sildenafil (VIAGRA) 50 MG tablet    simvastatin (ZOCOR) 20 MG tablet       Endocrine and Metabolic    Diabetes mellitus type 2, controlled (CMS/[facility])     Monitor: The problem is unchanged.  Evaluation: Labs/tests ordered, see encounter summary.  Assessment/Treatment:  Continue current treatment/monitoring regimen.         Relevant Medications    metFORMIN (GLUCOPHAGE) 500 MG  tablet    simvastatin (ZOCOR) 20 MG tablet    Other Relevant Orders    SERVICE TO OPHTHALMOLOGY       Skin    Psoriasis    Relevant Orders    SERVICE TO DERMATOLOGY      Other Visit Diagnoses     Need for immunization against influenza    -  Primary    Relevant Orders    INFLUENZA QUADRIVALENT HIGH DOSE PRES FREE 0.7 ML VACC,IM (Completed)    Erectile dysfunction, unspecified erectile dysfunction type        Relevant Medications    sildenafil (VIAGRA) 50 MG tablet        Recent Labs and Imaging reviewed with patient to ensure understanding.  Patient educated on medications; timing and dosages reviewed.    Aleks Clark MD   The patient is a 89y Female complaining of pain, ankle.

## 2022-09-17 NOTE — ED PROVIDER NOTE - ATTENDING APP SHARED VISIT CONTRIBUTION OF CARE
I have personally seen and examined this pt I have fully participated in the care of this pt I have made amendments to the documentation where appropriate and otherwise hx, exam and plan as documented by the ACP.

## 2022-09-17 NOTE — ED PROVIDER NOTE - NS ED ATTENDING STATEMENT MOD
This was a shared visit with the NINOSKA. I reviewed and verified the documentation and independently performed the documented:

## 2022-09-17 NOTE — PATIENT PROFILE ADULT - FALL HARM RISK - HARM RISK INTERVENTIONS

## 2022-09-17 NOTE — ED PROVIDER NOTE - NS ED ROS FT
Constitutional: (-) Fever, (-) Chills  Skin: (-) Rashes, (-) Wounds  Eyes: (-) Visual changes, (-) Discharge, (-) Redness  Ears: (-) Hearing loss, (-)Tinnitus, (-) Ear pain  Nose: (-) Nasal congestion, (-) Runny nose  Mouth/Throat: (-) Sore throat  CV: (-) Chest pain  Resp: (-) Cough, (-) Shortness of breath, (-) Dyspnea on Exertion, (-) Wheezing  GI: (-) Abdominal pain, (-) Nausea, (-) Vomiting, (-) Diarrhea  : (-) Dysuria   MSK: (+) Myalgias, (+) Calf pain  Neuro: (-) Headache

## 2022-09-17 NOTE — ED PROVIDER NOTE - OBJECTIVE STATEMENT
89-year-old female with past medical history of hypertension, CVA, neuropathy presents the emergency room for ankle pain.  Patient reports right foot and ankle pain that radiates up right leg to the thigh that began this morning when she woke up.  Patient states she got out of bed, did not fall or twist the leg, was using walker to go to the bathroom when she felt a severe pain.  Denies chest pain, shortness of breath, numbness, tingling.  States that she took 2 of 650 mg Tylenol at 7 AM with some relief of pain.  Patient denies pain while sitting, only pain with ambulation or with palpation.  Patient has had a DVT in the past, no longer on blood thinners, unsure which leg it was at the time.

## 2022-09-17 NOTE — ED ADULT TRIAGE NOTE - HEIGHT IN INCHES
Progress Notes by Ayesha Landin MD at 10/08/18 01:48 PM     Author:  Ayesha Landin MD Service:  (none) Author Type:  Physician     Filed:  10/08/18 05:29 PM Encounter Date:  10/8/2018 Status:  Signed     :  Ayesha Landin MD (Physician)            HISTORY OF PRESENT ILLNESS:  Yoseph Gordon is a 67year old male[AB1.1T] here for evaluation because of a history of a tubular adenoma and diverticulosis. Last colonoscopy was 5 years ago. That showed extensive diverticulosis. He had a tubular adenoma 2008. He has no episodes of diverticulitis. He denies any lower abdominal pain, constipation, rectal bleeding or melena. He did start fiber therapy. He has no heartburn, dysphagia, odynophagia hematemesis[AB1.1M]        PAST MEDICAL HISTORY:  Patient Active Problem List    Diagnosis    â¢ Diabetes type 2, uncontrolled   â¢ DIABETIC EYE EXAM   â¢ Myopia   â¢ Astigmatism, unspecified   â¢ Other seborrheic keratosis   â¢ Inguinal hernia, right   â¢ Renal stone   â¢ Elevated PSA         ALLERGIES:  Review of patient's allergies indicates no known allergies. CURRENT MEDICATIONS:  Current Outpatient Prescriptions     Medication  Sig   â¢ lisinopril (PRINIVIL,ZESTRIL) 5 MG tablet TAKE 1 TABLET BY MOUTH DAILY   â¢ glipiZIDE (GLUCOTROL) 5 MG 24 hr tablet Take 1 Tab by mouth every morning. â¢ Probiotic Product (PROBIOTIC FORMULA OR) Take  by mouth. â¢ FISH OIL Take  by mouth daily. â¢ ONE TOUCH ULTRASOFT LANCETS MISC 1 Each 3 (three) times daily. Test as directed. â¢ glucose blood (ONE TOUCH ULTRA TEST STRIPS) test strip 1 Each 3 (three) times daily. Test as directed. â¢ Aspirin 81 MG tablet Take 81 mg by mouth daily. â¢ GLUCOCOM MONITOR W/DEVICE KIT testing TID         SOCIAL HISTORY:  Social History     Social History      â¢ Marital status:       Spouse name: N/A   â¢ Number of children:  N/A   â¢ Years of education:  N/A     Occupational History    â¢ Not on file.      Social History Main Topics      â¢ Smoking "status:  Never Smoker   â¢ Smokeless tobacco:  Never Used   â¢ Alcohol use  No   â¢ Drug use:  No   â¢ Sexual activity:  Yes     Partners: Female     Other Topics  Concern   â¢ . ... Medical Equipment . ... No   â¢ Cpap No   â¢ Occupational Hazards No   â¢ Oxygen No   â¢ Other Home Medical Equipment No   â¢ Financial Barriers Impacting Healthcare No   â¢ Cane No   â¢ . ... Lifestyle . ... Yes   â¢ Support System No   â¢ Walker No   â¢ Hazardous Hobbies No   â¢ Cultural Needs No   â¢ Wheel Chair No   â¢ Seat Belt Yes     Social History Narrative         FAMILY HISTORY:[AB1.1T]  1 no family history of GI cancers[AB1.1M]      REVIEW OF SYSTEMS:[AB1.1T]  Patient denies any chest pain, shortness breath, bowel changes, weight loss abdominal pain. All other systems reviewed by me negative[AB1.1M]      PHYSICAL EXAMINATION:  Vital Signs:  Ht 5' 9"" (1.753 m)  Wt 146 lb (66.2 kg)  BMI 21.56 kg/m2  General:  The patient is in no acute distress. HEENT:  Normocephalic, atraumatic. Oropharynx clear. No scleral icterus  Neck: Supple. No lymphadenopathy  Lungs:  Clear to auscultation bilaterally  Cardiovascular:  Regular rate and rhythm. Normal S1 and S2  Abdomen:  Soft, nontender, nondistended. Positive bowel sounds. No guarding or rebound  Extremities:  No clubbing, cyanosis, or edema  Neurologic:  The patient is awake, alert, and oriented x3  Skin:  No jaundice. No caput medusae. No evidence of chronic liver disease      ASSESSMENT:[AB1.1T]  1. History of a tubular adenoma  2. Diverticulosis[AB1.1M]      PLAN:[AB1.1T]  1. We will do a colonoscopy for surveillance purposes. Patient is agreeable to plan  2. The risks, benefits and alternatives to the procedure were fully expanded patient including the risk of perforation, bleeding missed lesions  3. Patient will maintain a high-fiber diet for the diverticulosis. He will drink plenty of fluids.   He will let me know if he ever develops any lower abdominal pain[AB1.1M]    Electronically " Signed by:    Ciara Mclaughlin MD , 10/8/2018[AB1.2T]        Revision History        User Key Date/Time User Provider Type Action    > AB1.2 10/08/18 05:29 PM Ciara Mclaughlin MD Physician Sign     AB1.1 10/08/18 01:48 PM Ciara Mclaughlin MD Physician     M - Manual, T - Template 6

## 2022-09-17 NOTE — ED PROVIDER NOTE - PROGRESS NOTE DETAILS
KELLY Li: results discussed with patient, some pain improvement, unable to ambulate, will admit for symptom control and PT

## 2022-09-17 NOTE — ED ADULT NURSE NOTE - NSIMPLEMENTINTERV_GEN_ALL_ED
Implemented All Fall with Harm Risk Interventions:  [place] to call system. Call bell, personal items and telephone within reach. Instruct patient to call for assistance. Room bathroom lighting operational. Non-slip footwear when patient is off stretcher. Physically safe environment: no spills, clutter or unnecessary equipment. Stretcher in lowest position, wheels locked, appropriate side rails in place. Provide visual cue, wrist band, yellow gown, etc. Monitor gait and stability. Monitor for mental status changes and reorient to person, place, and time. Review medications for side effects contributing to fall risk. Reinforce activity limits and safety measures with patient and family. Provide visual clues: red socks.

## 2022-09-18 LAB
ANION GAP SERPL CALC-SCNC: 6 MMOL/L — SIGNIFICANT CHANGE UP (ref 5–17)
BUN SERPL-MCNC: 18 MG/DL — SIGNIFICANT CHANGE UP (ref 7–23)
CALCIUM SERPL-MCNC: 8.6 MG/DL — SIGNIFICANT CHANGE UP (ref 8.5–10.1)
CHLORIDE SERPL-SCNC: 110 MMOL/L — HIGH (ref 96–108)
CO2 SERPL-SCNC: 26 MMOL/L — SIGNIFICANT CHANGE UP (ref 22–31)
CREAT SERPL-MCNC: 1.15 MG/DL — SIGNIFICANT CHANGE UP (ref 0.5–1.3)
CRP SERPL-MCNC: <3 MG/L — SIGNIFICANT CHANGE UP
EGFR: 45 ML/MIN/1.73M2 — LOW
GLUCOSE SERPL-MCNC: 70 MG/DL — SIGNIFICANT CHANGE UP (ref 70–99)
HCT VFR BLD CALC: 36.2 % — SIGNIFICANT CHANGE UP (ref 34.5–45)
HGB BLD-MCNC: 11.5 G/DL — SIGNIFICANT CHANGE UP (ref 11.5–15.5)
MCHC RBC-ENTMCNC: 27.4 PG — SIGNIFICANT CHANGE UP (ref 27–34)
MCHC RBC-ENTMCNC: 31.8 G/DL — LOW (ref 32–36)
MCV RBC AUTO: 86.4 FL — SIGNIFICANT CHANGE UP (ref 80–100)
NRBC # BLD: 0 /100 WBCS — SIGNIFICANT CHANGE UP (ref 0–0)
PLATELET # BLD AUTO: 212 K/UL — SIGNIFICANT CHANGE UP (ref 150–400)
POTASSIUM SERPL-MCNC: 3.7 MMOL/L — SIGNIFICANT CHANGE UP (ref 3.5–5.3)
POTASSIUM SERPL-SCNC: 3.7 MMOL/L — SIGNIFICANT CHANGE UP (ref 3.5–5.3)
RBC # BLD: 4.19 M/UL — SIGNIFICANT CHANGE UP (ref 3.8–5.2)
RBC # FLD: 14.8 % — HIGH (ref 10.3–14.5)
SODIUM SERPL-SCNC: 142 MMOL/L — SIGNIFICANT CHANGE UP (ref 135–145)
WBC # BLD: 5.22 K/UL — SIGNIFICANT CHANGE UP (ref 3.8–10.5)
WBC # FLD AUTO: 5.22 K/UL — SIGNIFICANT CHANGE UP (ref 3.8–10.5)

## 2022-09-18 PROCEDURE — 99232 SBSQ HOSP IP/OBS MODERATE 35: CPT

## 2022-09-18 RX ADMIN — TRAMADOL HYDROCHLORIDE 50 MILLIGRAM(S): 50 TABLET ORAL at 23:11

## 2022-09-18 RX ADMIN — GABAPENTIN 300 MILLIGRAM(S): 400 CAPSULE ORAL at 14:30

## 2022-09-18 RX ADMIN — Medication 81 MILLIGRAM(S): at 14:30

## 2022-09-18 RX ADMIN — GABAPENTIN 300 MILLIGRAM(S): 400 CAPSULE ORAL at 21:39

## 2022-09-18 RX ADMIN — TRAMADOL HYDROCHLORIDE 50 MILLIGRAM(S): 50 TABLET ORAL at 14:30

## 2022-09-18 RX ADMIN — ENOXAPARIN SODIUM 40 MILLIGRAM(S): 100 INJECTION SUBCUTANEOUS at 21:39

## 2022-09-18 RX ADMIN — GABAPENTIN 300 MILLIGRAM(S): 400 CAPSULE ORAL at 05:20

## 2022-09-18 RX ADMIN — ATORVASTATIN CALCIUM 80 MILLIGRAM(S): 80 TABLET, FILM COATED ORAL at 21:39

## 2022-09-18 RX ADMIN — SODIUM CHLORIDE 75 MILLILITER(S): 9 INJECTION INTRAMUSCULAR; INTRAVENOUS; SUBCUTANEOUS at 05:20

## 2022-09-18 RX ADMIN — TRAMADOL HYDROCHLORIDE 50 MILLIGRAM(S): 50 TABLET ORAL at 22:11

## 2022-09-18 RX ADMIN — AMLODIPINE BESYLATE 10 MILLIGRAM(S): 2.5 TABLET ORAL at 05:20

## 2022-09-18 NOTE — PROGRESS NOTE ADULT - ASSESSMENT
89-yo F with hx HTN, CVA w/ left side weakness, neuropathy, Fibromyalgia & Osteoarthritis presented w/ R foot and ankle pain.    R ankle & foot pain   - suspect this may be due to neuropathy, Xrays of the foot do not show any cause of pain, but will get CT to look for other causes as patient says this is acute  - pain management with Tylenol and ultram  - RLE US showed no evidence of right lower extremity deep venous thrombosis     HTN  - c/w Norvasc    HLD  - c/w Lipitor    History of DVT    Prophylaxis:  DVT: Lovenox  GI: PO diet

## 2022-09-19 LAB
ANION GAP SERPL CALC-SCNC: 7 MMOL/L — SIGNIFICANT CHANGE UP (ref 5–17)
BUN SERPL-MCNC: 15 MG/DL — SIGNIFICANT CHANGE UP (ref 7–23)
CALCIUM SERPL-MCNC: 9.1 MG/DL — SIGNIFICANT CHANGE UP (ref 8.5–10.1)
CHLORIDE SERPL-SCNC: 110 MMOL/L — HIGH (ref 96–108)
CO2 SERPL-SCNC: 25 MMOL/L — SIGNIFICANT CHANGE UP (ref 22–31)
CREAT SERPL-MCNC: 0.98 MG/DL — SIGNIFICANT CHANGE UP (ref 0.5–1.3)
EGFR: 55 ML/MIN/1.73M2 — LOW
GLUCOSE SERPL-MCNC: 68 MG/DL — LOW (ref 70–99)
MAGNESIUM SERPL-MCNC: 2.2 MG/DL — SIGNIFICANT CHANGE UP (ref 1.6–2.6)
PHOSPHATE SERPL-MCNC: 3.2 MG/DL — SIGNIFICANT CHANGE UP (ref 2.5–4.5)
POTASSIUM SERPL-MCNC: 4 MMOL/L — SIGNIFICANT CHANGE UP (ref 3.5–5.3)
POTASSIUM SERPL-SCNC: 4 MMOL/L — SIGNIFICANT CHANGE UP (ref 3.5–5.3)
SODIUM SERPL-SCNC: 142 MMOL/L — SIGNIFICANT CHANGE UP (ref 135–145)
URATE SERPL-MCNC: 7.1 MG/DL — HIGH (ref 2.5–7)

## 2022-09-19 PROCEDURE — 73700 CT LOWER EXTREMITY W/O DYE: CPT | Mod: 26,RT

## 2022-09-19 PROCEDURE — 99232 SBSQ HOSP IP/OBS MODERATE 35: CPT

## 2022-09-19 PROCEDURE — 73718 MRI LOWER EXTREMITY W/O DYE: CPT | Mod: 26,RT

## 2022-09-19 PROCEDURE — 93923 UPR/LXTR ART STDY 3+ LVLS: CPT | Mod: 26

## 2022-09-19 RX ORDER — ACETAMINOPHEN 500 MG
1000 TABLET ORAL ONCE
Refills: 0 | Status: COMPLETED | OUTPATIENT
Start: 2022-09-19 | End: 2022-09-19

## 2022-09-19 RX ADMIN — Medication 40 MILLIGRAM(S): at 11:35

## 2022-09-19 RX ADMIN — GABAPENTIN 300 MILLIGRAM(S): 400 CAPSULE ORAL at 05:46

## 2022-09-19 RX ADMIN — TRAMADOL HYDROCHLORIDE 50 MILLIGRAM(S): 50 TABLET ORAL at 03:34

## 2022-09-19 RX ADMIN — TRAMADOL HYDROCHLORIDE 50 MILLIGRAM(S): 50 TABLET ORAL at 23:59

## 2022-09-19 RX ADMIN — GABAPENTIN 300 MILLIGRAM(S): 400 CAPSULE ORAL at 14:45

## 2022-09-19 RX ADMIN — Medication 650 MILLIGRAM(S): at 11:40

## 2022-09-19 RX ADMIN — TRAMADOL HYDROCHLORIDE 50 MILLIGRAM(S): 50 TABLET ORAL at 18:31

## 2022-09-19 RX ADMIN — AMLODIPINE BESYLATE 10 MILLIGRAM(S): 2.5 TABLET ORAL at 05:47

## 2022-09-19 RX ADMIN — Medication 400 MILLIGRAM(S): at 06:20

## 2022-09-19 RX ADMIN — ENOXAPARIN SODIUM 40 MILLIGRAM(S): 100 INJECTION SUBCUTANEOUS at 21:45

## 2022-09-19 RX ADMIN — Medication 81 MILLIGRAM(S): at 11:35

## 2022-09-19 RX ADMIN — ATORVASTATIN CALCIUM 80 MILLIGRAM(S): 80 TABLET, FILM COATED ORAL at 21:45

## 2022-09-19 RX ADMIN — GABAPENTIN 300 MILLIGRAM(S): 400 CAPSULE ORAL at 21:46

## 2022-09-19 RX ADMIN — TRAMADOL HYDROCHLORIDE 50 MILLIGRAM(S): 50 TABLET ORAL at 04:34

## 2022-09-19 NOTE — CONSULT NOTE ADULT - SUBJECTIVE AND OBJECTIVE BOX
Patient is a 89y old  Female who presents with a chief complaint of     HPI:  This is a 89-yo F with hx HTN, CVA, neuropathy, presenting due to R foot and ankle pain.  Pain started this AM, present only with ambulation and radiates up right leg to the thigh, it is so severe she is unable to weight bear on it. Tylenol did not provide relief. denies inciting events/trauma. Had DVT in the past unclear which leg, no longer on AC. ambulates with walker at baseline. Duplex (-) dvt, XR (-) for fx. vitals unremarkable, creat 1.35 was 1 a year ago. denies hx gout, chest pain, shortness of breath, numbness, tingling.  (17 Sep 2022 21:46)      PAST MEDICAL & SURGICAL HISTORY:  Hypertension      Fibromyalgia      Osteoarthritis      Neuropathy      HTN (hypertension)      CVA (cerebral vascular accident)  left side weakness          MEDICATIONS  (STANDING):  amLODIPine   Tablet 10 milliGRAM(s) Oral daily  aspirin  chewable 81 milliGRAM(s) Oral daily  atorvastatin 80 milliGRAM(s) Oral at bedtime  enoxaparin Injectable 40 milliGRAM(s) SubCutaneous every 24 hours  gabapentin 300 milliGRAM(s) Oral three times a day    MEDICATIONS  (PRN):  acetaminophen     Tablet .. 650 milliGRAM(s) Oral every 6 hours PRN Temp greater or equal to 38C (100.4F), Mild Pain (1 - 3)  aluminum hydroxide/magnesium hydroxide/simethicone Suspension 30 milliLiter(s) Oral every 4 hours PRN Dyspepsia  fluticasone propionate 50 MICROgram(s)/spray Nasal Spray 1 Spray(s) Both Nostrils two times a day PRN rhinorrhea  melatonin 3 milliGRAM(s) Oral at bedtime PRN Insomnia  ondansetron Injectable 4 milliGRAM(s) IV Push every 8 hours PRN Nausea and/or Vomiting  traMADol 50 milliGRAM(s) Oral every 4 hours PRN Severe Pain (7 - 10)      Allergies    No Known Allergies    Intolerances        VITALS:    Vital Signs Last 24 Hrs  T(C): 37 (19 Sep 2022 05:27), Max: 37 (19 Sep 2022 05:27)  T(F): 98.6 (19 Sep 2022 05:27), Max: 98.6 (19 Sep 2022 05:27)  HR: 54 (19 Sep 2022 05:27) (52 - 69)  BP: 134/66 (19 Sep 2022 05:27) (119/66 - 144/83)  BP(mean): --  RR: 16 (19 Sep 2022 05:27) (16 - 18)  SpO2: 93% (19 Sep 2022 05:27) (93% - 99%)    Parameters below as of 19 Sep 2022 05:27  Patient On (Oxygen Delivery Method): room air        LABS:                          11.5   5.22  )-----------( 212      ( 18 Sep 2022 06:25 )             36.2       09-18    142  |  110<H>  |  18  ----------------------------<  70  3.7   |  26  |  1.15    Ca    8.6      18 Sep 2022 06:25  Phos  3.5     09-17  Mg     2.5     09-17    TPro  7.8  /  Alb  3.3  /  TBili  0.4  /  DBili  x   /  AST  23  /  ALT  15  /  AlkPhos  70  09-17      CAPILLARY BLOOD GLUCOSE          PT/INR - ( 17 Sep 2022 16:52 )   PT: 12.4 sec;   INR: 1.03 ratio         PTT - ( 17 Sep 2022 16:52 )  PTT:26.3 sec    LOWER EXTREMITY PHYSICAL EXAM:    Vascular: DP/PT 0/4 L, DP/PT impalpable 2/2 to edema R, B/L, CFT about 5 seconds B/L, Temperature gradient warm to warm R, warm to cool L.   Neuro: Epicritic sensation intact to the level of midfoot, B/L.  Musculoskeletal/Ortho: unremarkable. severe pain on palpation to the right ankle, unable to move ankle.   Skin: L foot no acute signs of infection. R foot ankle erythema and edema to the midfoot to above ankle. No open wound or fracture, no skin tenting.     RADIOLOGY & ADDITIONAL STUDIES:      ACC: [id number] EXAM:  XR FOOT COMP MIN 3 VIEWS RT                        ACC: [id number] EXAM:  XR ANKLE COMP MIN 3 VIEWS RT                          PROCEDURE DATE:  09/17/2022          INTERPRETATION:  Indication: Right foot and ankle pain    TECHNIQUE: 3 views of the right foot and ankle were obtained    COMPARISON: None.    FINDINGS/  IMPRESSION: There are hammertoe deformities of the right foot. No   fracture or dislocation is seen. The right ankle mortise is intact. No   discrete osseous lesion is seen. The overlying soft tissues appear   unremarkable. There are vascular calcifications.    --- End of Report ---            HECTOR SERRATO MD; Attending Radiologist  This document has been electronically signed. Sep 18 2022 10:04AM     Patient is a 89y old  Female who presents with a chief complaint of     HPI:  This is a 89-yo F with hx HTN, CVA, neuropathy, presenting due to R foot and ankle pain.  Pain started this AM, present only with ambulation and radiates up right leg to the thigh, it is so severe she is unable to weight bear on it. Tylenol did not provide relief. denies inciting events/trauma. Had DVT in the past unclear which leg, no longer on AC. ambulates with walker at baseline. Duplex (-) dvt, XR (-) for fx. vitals unremarkable, creat 1.35 was 1 a year ago. denies hx gout, chest pain, shortness of breath, numbness, tingling.  (17 Sep 2022 21:46)      PAST MEDICAL & SURGICAL HISTORY:  Hypertension      Fibromyalgia      Osteoarthritis      Neuropathy      HTN (hypertension)      CVA (cerebral vascular accident)  left side weakness          MEDICATIONS  (STANDING):  amLODIPine   Tablet 10 milliGRAM(s) Oral daily  aspirin  chewable 81 milliGRAM(s) Oral daily  atorvastatin 80 milliGRAM(s) Oral at bedtime  enoxaparin Injectable 40 milliGRAM(s) SubCutaneous every 24 hours  gabapentin 300 milliGRAM(s) Oral three times a day    MEDICATIONS  (PRN):  acetaminophen     Tablet .. 650 milliGRAM(s) Oral every 6 hours PRN Temp greater or equal to 38C (100.4F), Mild Pain (1 - 3)  aluminum hydroxide/magnesium hydroxide/simethicone Suspension 30 milliLiter(s) Oral every 4 hours PRN Dyspepsia  fluticasone propionate 50 MICROgram(s)/spray Nasal Spray 1 Spray(s) Both Nostrils two times a day PRN rhinorrhea  melatonin 3 milliGRAM(s) Oral at bedtime PRN Insomnia  ondansetron Injectable 4 milliGRAM(s) IV Push every 8 hours PRN Nausea and/or Vomiting  traMADol 50 milliGRAM(s) Oral every 4 hours PRN Severe Pain (7 - 10)      Allergies    No Known Allergies    Intolerances        VITALS:    Vital Signs Last 24 Hrs  T(C): 37 (19 Sep 2022 05:27), Max: 37 (19 Sep 2022 05:27)  T(F): 98.6 (19 Sep 2022 05:27), Max: 98.6 (19 Sep 2022 05:27)  HR: 54 (19 Sep 2022 05:27) (52 - 69)  BP: 134/66 (19 Sep 2022 05:27) (119/66 - 144/83)  BP(mean): --  RR: 16 (19 Sep 2022 05:27) (16 - 18)  SpO2: 93% (19 Sep 2022 05:27) (93% - 99%)    Parameters below as of 19 Sep 2022 05:27  Patient On (Oxygen Delivery Method): room air        LABS:                          11.5   5.22  )-----------( 212      ( 18 Sep 2022 06:25 )             36.2       09-18    142  |  110<H>  |  18  ----------------------------<  70  3.7   |  26  |  1.15    Ca    8.6      18 Sep 2022 06:25  Phos  3.5     09-17  Mg     2.5     09-17    TPro  7.8  /  Alb  3.3  /  TBili  0.4  /  DBili  x   /  AST  23  /  ALT  15  /  AlkPhos  70  09-17      CAPILLARY BLOOD GLUCOSE          PT/INR - ( 17 Sep 2022 16:52 )   PT: 12.4 sec;   INR: 1.03 ratio         PTT - ( 17 Sep 2022 16:52 )  PTT:26.3 sec    LOWER EXTREMITY PHYSICAL EXAM:    Vascular: DP/PT 1/4 L, DP/PT impalpable 2/2 to edema R, B/L, CFT about 5 seconds B/L, Temperature gradient warm to warm R, warm to cool L.   Neuro: Epicritic sensation intact to the level of midfoot, B/L.  Musculoskeletal/Ortho: unremarkable. severe pain on palpation to the right ankle, unable to move ankle.   Skin: L foot no acute signs of infection. R foot ankle erythema and edema to the midfoot to above ankle. No open wound or fracture, no skin tenting.     RADIOLOGY & ADDITIONAL STUDIES:      ACC: [id number] EXAM:  XR FOOT COMP MIN 3 VIEWS RT                        ACC: [id number] EXAM:  XR ANKLE COMP MIN 3 VIEWS RT                          PROCEDURE DATE:  09/17/2022          INTERPRETATION:  Indication: Right foot and ankle pain    TECHNIQUE: 3 views of the right foot and ankle were obtained    COMPARISON: None.    FINDINGS/  IMPRESSION: There are hammertoe deformities of the right foot. No   fracture or dislocation is seen. The right ankle mortise is intact. No   discrete osseous lesion is seen. The overlying soft tissues appear   unremarkable. There are vascular calcifications.    --- End of Report ---            HECTOR SERRATO MD; Attending Radiologist  This document has been electronically signed. Sep 18 2022 10:04AM

## 2022-09-19 NOTE — PHYSICAL THERAPY INITIAL EVALUATION ADULT - GENERAL OBSERVATIONS, REHAB EVAL
Pt is alert, oriented x 4, follow instructions, on room air , c/o generalized weakness and pain in the feet and right hand.

## 2022-09-19 NOTE — PROGRESS NOTE ADULT - ASSESSMENT
89-yo F with hx HTN, CVA w/ left side weakness, neuropathy, Fibromyalgia & Osteoarthritis presented w/ R foot and ankle pain.    R ankle & foot pain   - suspect this may be due to neuropathy, Xrays of the foot do not show any cause of pain, but will get CT to look for other causes as patient says this is acute  - pain management with Tylenol and ultram  - RLE US showed no evidence of right lower extremity deep venous thrombosis  - start prednisone for suspected gout  - MRI showed moderate subtalar effusion is present, may be reactive/posttraumatic w/ bimalleolar edema, worse medially     HTN  - c/w Norvasc    HLD  - c/w Lipitor    History of DVT    Prophylaxis:  DVT: Lovenox  GI: PO diet       Dispo: Rehab

## 2022-09-19 NOTE — CONSULT NOTE ADULT - ASSESSMENT
89y Female with severely painful R foot ankle  - Patient seen and evaluated  - Afebrile , vitals stable.  WBC 5.22, ESR 11, CRP <3  - Musculoskeletal/Ortho: unremarkable. severe pain on palpation to the right ankle, unable to move ankle.   - Skin: L foot no acute signs of infection. R foot ankle erythema and edema to the midfoot to above ankle. No open wound or fracture, no skin tenting.   - R foot ankle and foot Xray: no fracture, no radiological joint abnormality  - Ordered ABIPVR, Uric acid, R foot MR.   - Given normal inflammation markers, clinical presentation, and acute onset, highly suspect gout.   - Pod plan R foot immobilization for now, treatment plan pending lab / ABIPVR  - Seen with attending.  89y Female with severely painful R foot ankle  - Patient seen and evaluated  - Afebrile , vitals stable.  WBC 5.22, ESR 11, CRP <3  - Musculoskeletal/Ortho: unremarkable. severe pain on palpation to the right ankle, unable to move ankle.   - Skin: L foot no acute signs of infection. R foot ankle erythema and edema to the midfoot to above ankle. No open wound or fracture, no skin tenting.   - R foot ankle and foot Xray: no fracture, no radiological joint abnormality  - ABIPVR: Right MYRON = 1.14, Left MYRON = 1.01  - Uric acid 7.1  - R foot MR, resident read: possible bite signs with significant increase signal on STIR and decrease signal on T1 to medial gutter and posterior tib-talus joint, highly suspect gouty changes.   - Recommend Colchicine 0.6mg bid.   - Given normal inflammation markers, clinical presentation, acute onset, elevated UA and MRI finding, highly suspect gout.   - The stability for patient to be d/c and follow up outpatient based on the resolving of the symptoms upon medicine management .   - Seen with attending.

## 2022-09-19 NOTE — PHYSICAL THERAPY INITIAL EVALUATION ADULT - PERTINENT HX OF CURRENT PROBLEM, REHAB EVAL
Pt is admitted with c/o R LE pain, pmhx neuropathy, HTN , CVA. Pt states she is gradually getting weak and difficulty in ambulating.

## 2022-09-19 NOTE — PHYSICAL THERAPY INITIAL EVALUATION ADULT - LIVES WITH, PROFILE
Pt states she lives alone in a pvt house, 5 steps with rails to enter and 13 steps to 2nd floor basement, she hires pvt aide as needed.

## 2022-09-19 NOTE — PHYSICAL THERAPY INITIAL EVALUATION ADULT - STRENGTHENING, PT EVAL
Improve strength in the UE and LE to 4+/5 or 5/5, improve endurance to good and be able to perform functional tasks-bed mobility, sitting, standing, transfers and ambulate in a safe manner with or without  assistive device and prevent falls.

## 2022-09-19 NOTE — PHYSICAL THERAPY INITIAL EVALUATION ADULT - DIAGNOSIS, PT EVAL
Pt presents with c/o pain the feet/lower , strength and endurance deficits, difficulty in ambulation and transfers, fall risk.

## 2022-09-19 NOTE — PHYSICAL THERAPY INITIAL EVALUATION ADULT - FOLLOWS COMMANDS/ANSWERS QUESTIONS, REHAB EVAL
with difficulty in execution due to pain and weakness in the extremities/100% of the time/75% of the time

## 2022-09-20 PROCEDURE — 99232 SBSQ HOSP IP/OBS MODERATE 35: CPT

## 2022-09-20 RX ORDER — COLCHICINE 0.6 MG
0.6 TABLET ORAL
Refills: 0 | Status: DISCONTINUED | OUTPATIENT
Start: 2022-09-20 | End: 2022-09-21

## 2022-09-20 RX ADMIN — Medication 3 MILLIGRAM(S): at 21:47

## 2022-09-20 RX ADMIN — Medication 650 MILLIGRAM(S): at 21:46

## 2022-09-20 RX ADMIN — ENOXAPARIN SODIUM 40 MILLIGRAM(S): 100 INJECTION SUBCUTANEOUS at 21:45

## 2022-09-20 RX ADMIN — Medication 650 MILLIGRAM(S): at 13:57

## 2022-09-20 RX ADMIN — GABAPENTIN 300 MILLIGRAM(S): 400 CAPSULE ORAL at 21:46

## 2022-09-20 RX ADMIN — GABAPENTIN 300 MILLIGRAM(S): 400 CAPSULE ORAL at 13:16

## 2022-09-20 RX ADMIN — ATORVASTATIN CALCIUM 80 MILLIGRAM(S): 80 TABLET, FILM COATED ORAL at 21:53

## 2022-09-20 RX ADMIN — TRAMADOL HYDROCHLORIDE 50 MILLIGRAM(S): 50 TABLET ORAL at 18:28

## 2022-09-20 RX ADMIN — GABAPENTIN 300 MILLIGRAM(S): 400 CAPSULE ORAL at 06:12

## 2022-09-20 RX ADMIN — Medication 650 MILLIGRAM(S): at 14:28

## 2022-09-20 RX ADMIN — TRAMADOL HYDROCHLORIDE 50 MILLIGRAM(S): 50 TABLET ORAL at 12:52

## 2022-09-20 RX ADMIN — Medication 0.6 MILLIGRAM(S): at 21:47

## 2022-09-20 RX ADMIN — Medication 81 MILLIGRAM(S): at 12:53

## 2022-09-20 RX ADMIN — Medication 40 MILLIGRAM(S): at 06:11

## 2022-09-20 RX ADMIN — TRAMADOL HYDROCHLORIDE 50 MILLIGRAM(S): 50 TABLET ORAL at 00:45

## 2022-09-20 RX ADMIN — AMLODIPINE BESYLATE 10 MILLIGRAM(S): 2.5 TABLET ORAL at 06:12

## 2022-09-20 NOTE — PROGRESS NOTE ADULT - ASSESSMENT
89y Female with severely painful R ankle.  - Patient seen and evaluated.  - Afebrile, vitals stable, WBC 5.22.  - Severe pain on palpation to the right ankle, slight passive ankle ROM. R foot and ankle rubor and edema from the midfoot to just proximal to the ankle, no open wound or fracture, no skin tenting. L foot no open wounds, no acute signs of infection.  - R foot ankle and foot Xray: No fracture, no radiological joint abnormality.  - ABIPVR: Right MYRON = 1.14, Left MYRON = 1.01.  - Uric acid 7.1.  - Right Foot MRI: Possible tophaceous gout in medial and posterior ankle joint (resident read), no tendon or ligamentous injury and worsened edema (radiology read).  - Recommend Colchicine 0.6mg BID.   - Given normal inflammation markers, clinical presentation, acute onset, elevated UA and MRI finding, highly suspect gout.   - Pt experiences slight pain improvement with steroids.  - Pod plan stability for patient to be dischaged and follow up outpatient based on the resolving of the symptoms upon medicine management.   - Discussed with attending.

## 2022-09-20 NOTE — PROGRESS NOTE ADULT - ASSESSMENT
89-yo F with hx HTN, CVA w/ left side weakness, neuropathy, Fibromyalgia & Osteoarthritis presented w/ R foot and ankle pain.    R ankle & foot pain   - suspect this may be due to neuropathy, Xrays of the foot do not show any cause of pain, but will get CT to look for other causes as patient says this is acute  - pain management with Tylenol and ultram  - RLE US showed no evidence of right lower extremity deep venous thrombosis  - c/w prednisone for suspected gout & add colchicine   - MRI showed moderate subtalar effusion is present, may be reactive/posttraumatic w/ bimalleolar edema, worse medially     HTN  - c/w Norvasc    HLD  - c/w Lipitor    History of DVT    Prophylaxis:  DVT: Lovenox  GI: PO diet       Dispo: Rehab

## 2022-09-21 ENCOUNTER — TRANSCRIPTION ENCOUNTER (OUTPATIENT)
Age: 87
End: 2022-09-21

## 2022-09-21 VITALS
HEART RATE: 88 BPM | TEMPERATURE: 98 F | DIASTOLIC BLOOD PRESSURE: 80 MMHG | SYSTOLIC BLOOD PRESSURE: 144 MMHG | RESPIRATION RATE: 18 BRPM | OXYGEN SATURATION: 95 %

## 2022-09-21 LAB
ANION GAP SERPL CALC-SCNC: 7 MMOL/L — SIGNIFICANT CHANGE UP (ref 5–17)
BUN SERPL-MCNC: 19 MG/DL — SIGNIFICANT CHANGE UP (ref 7–23)
CALCIUM SERPL-MCNC: 8.8 MG/DL — SIGNIFICANT CHANGE UP (ref 8.5–10.1)
CHLORIDE SERPL-SCNC: 113 MMOL/L — HIGH (ref 96–108)
CO2 SERPL-SCNC: 24 MMOL/L — SIGNIFICANT CHANGE UP (ref 22–31)
CREAT SERPL-MCNC: 0.85 MG/DL — SIGNIFICANT CHANGE UP (ref 0.5–1.3)
EGFR: 65 ML/MIN/1.73M2 — SIGNIFICANT CHANGE UP
FLUAV AG NPH QL: SIGNIFICANT CHANGE UP
FLUBV AG NPH QL: SIGNIFICANT CHANGE UP
GLUCOSE SERPL-MCNC: 80 MG/DL — SIGNIFICANT CHANGE UP (ref 70–99)
MAGNESIUM SERPL-MCNC: 2 MG/DL — SIGNIFICANT CHANGE UP (ref 1.6–2.6)
PHOSPHATE SERPL-MCNC: 2.9 MG/DL — SIGNIFICANT CHANGE UP (ref 2.5–4.5)
POTASSIUM SERPL-MCNC: 4 MMOL/L — SIGNIFICANT CHANGE UP (ref 3.5–5.3)
POTASSIUM SERPL-SCNC: 4 MMOL/L — SIGNIFICANT CHANGE UP (ref 3.5–5.3)
SARS-COV-2 RNA SPEC QL NAA+PROBE: SIGNIFICANT CHANGE UP
SODIUM SERPL-SCNC: 144 MMOL/L — SIGNIFICANT CHANGE UP (ref 135–145)

## 2022-09-21 PROCEDURE — 99239 HOSP IP/OBS DSCHRG MGMT >30: CPT

## 2022-09-21 RX ORDER — MODERNA COVID-19 VACCINE, BIVALENT 25; 25 UG/.5ML; UG/.5ML
0.5 INJECTION, SUSPENSION INTRAMUSCULAR ONCE
Refills: 0 | Status: COMPLETED | OUTPATIENT
Start: 2022-09-21 | End: 2022-09-21

## 2022-09-21 RX ORDER — COLCHICINE 0.6 MG
1 TABLET ORAL
Qty: 0 | Refills: 0 | DISCHARGE
Start: 2022-09-21

## 2022-09-21 RX ORDER — POLYETHYLENE GLYCOL 3350 17 G/17G
17 POWDER, FOR SOLUTION ORAL DAILY
Refills: 0 | Status: DISCONTINUED | OUTPATIENT
Start: 2022-09-21 | End: 2022-09-21

## 2022-09-21 RX ADMIN — Medication 81 MILLIGRAM(S): at 12:12

## 2022-09-21 RX ADMIN — Medication 0.6 MILLIGRAM(S): at 17:07

## 2022-09-21 RX ADMIN — Medication 0.6 MILLIGRAM(S): at 05:33

## 2022-09-21 RX ADMIN — TRAMADOL HYDROCHLORIDE 50 MILLIGRAM(S): 50 TABLET ORAL at 05:43

## 2022-09-21 RX ADMIN — MODERNA COVID-19 VACCINE, BIVALENT 0.5 MILLILITER(S): 25; 25 INJECTION, SUSPENSION INTRAMUSCULAR at 17:07

## 2022-09-21 RX ADMIN — GABAPENTIN 300 MILLIGRAM(S): 400 CAPSULE ORAL at 05:33

## 2022-09-21 RX ADMIN — POLYETHYLENE GLYCOL 3350 17 GRAM(S): 17 POWDER, FOR SOLUTION ORAL at 06:12

## 2022-09-21 RX ADMIN — AMLODIPINE BESYLATE 10 MILLIGRAM(S): 2.5 TABLET ORAL at 05:34

## 2022-09-21 RX ADMIN — Medication 40 MILLIGRAM(S): at 05:34

## 2022-09-21 RX ADMIN — Medication 100 MILLIGRAM(S): at 13:43

## 2022-09-21 RX ADMIN — TRAMADOL HYDROCHLORIDE 50 MILLIGRAM(S): 50 TABLET ORAL at 06:28

## 2022-09-21 RX ADMIN — GABAPENTIN 300 MILLIGRAM(S): 400 CAPSULE ORAL at 13:43

## 2022-09-21 NOTE — DISCHARGE NOTE PROVIDER - NSDCCPCAREPLAN_GEN_ALL_CORE_FT
PRINCIPAL DISCHARGE DIAGNOSIS  Diagnosis: Lower extremity pain, right  Assessment and Plan of Treatment:

## 2022-09-21 NOTE — DISCHARGE NOTE NURSING/CASE MANAGEMENT/SOCIAL WORK - NSDCPEFALRISK_GEN_ALL_CORE
For information on Fall & Injury Prevention, visit: [email or web address] OR  [email or web address] OR  https://www.cdc.gov/steadi/patient.html

## 2022-09-21 NOTE — DISCHARGE NOTE PROVIDER - NSDCFUADDAPPT_GEN_ALL_CORE_FT
Podiatry Discharge Instructions:  Follow up: Please follow up with Dr. Bradford within 1 week of discharge from the hospital, please call [phone] for appointment and discuss that you recently were seen in the hospital.  Foot Care: keep feet dry clean all the time  Weight bearing: Please non weight bearing in a surgical shoe to RIGHT foot.   Antibiotics: Please continue as instructed.

## 2022-09-21 NOTE — DISCHARGE NOTE NURSING/CASE MANAGEMENT/SOCIAL WORK - NSDCVIVACCINE_GEN_ALL_CORE_FT
Moderna COVID-19 Vaccine, Bivalent; 21-Sep-2022 17:07; Emma Gonzales (RN); [employer] [id number] (Exp. Date: 13-Oct-2022); IntraMuscular; Deltoid Left.; 0.5 milliLiter(s);   influenza, injectable, quadrivalent, preservative free; 12-Oct-2017 15:24; Keyanna Dallas (RN); [employer]; [id number]; IntraMuscular; Deltoid Left.; 0.5 milliLiter(s); VIS (VIS Published: 07-Aug-2015, VIS Presented: 12-Oct-2017);   Tdap; 25-Dec-2019 20:38; Candice Orta (RN); [employer]; [id number] (Exp. Date: 17-Sep-2021); IntraMuscular; Deltoid Left.; 0.5 milliLiter(s); VIS (VIS Published: 09-May-2013, VIS Presented: 25-Dec-2019);

## 2022-09-21 NOTE — OCCUPATIONAL THERAPY INITIAL EVALUATION ADULT - ADDITIONAL COMMENTS
Encounter Date: 5/25/2019       History     Chief Complaint   Patient presents with    Vomiting     onset last nite    Cough    Wheezing     onset this am     Sherry is a 1 yo female with history of asthma here for evaluation of 3 days of URI sx, post tussive emesis. Yesterday with worsening cough. Last had albuterol yesterday. Mom reports woke up and couldn't stop coughing, thus mom decided to come to the ED for evaluation. No previous admission for mom, no prematurity, no intubation.         Review of patient's allergies indicates:   Allergen Reactions    Egg derived Hives    Peanut     Shellfish containing products Hives     Past Medical History:   Diagnosis Date    Asthma     Eczema      Past Surgical History:   Procedure Laterality Date    NO PAST SURGERIES       Family History   Problem Relation Age of Onset    Hypertension Maternal Grandmother         Copied from mother's family history at birth    Miscarriages / Stillbirths Maternal Grandmother         Copied from mother's family history at birth    Anuerysm Maternal Grandmother         Copied from mother's family history at birth    Anemia Mother         Copied from mother's history at birth    Asthma Paternal Uncle     Allergic rhinitis Brother     Asthma Brother     Asthma Maternal Uncle     Asthma Father         Childhood     Social History     Tobacco Use    Smoking status: Passive Smoke Exposure - Never Smoker    Smokeless tobacco: Never Used   Substance Use Topics    Alcohol use: No    Drug use: Not on file     Review of Systems   Constitutional: Positive for activity change.   HENT: Positive for congestion and rhinorrhea.    Eyes: Negative for discharge.   Respiratory: Positive for cough and wheezing.    Gastrointestinal: Positive for vomiting. Negative for abdominal pain and diarrhea.        Post tussive emesis    Genitourinary: Negative for decreased urine volume.   Musculoskeletal: Negative for myalgias.   Skin: Negative for  rash.   Psychiatric/Behavioral: Positive for sleep disturbance.       Physical Exam     Initial Vitals [05/25/19 0646]   BP Pulse Resp Temp SpO2   -- (!) 131 (!) 32 97.7 °F (36.5 °C) 98 %      MAP       --         Physical Exam    Constitutional: She appears well-developed and well-nourished. She is active.   HENT:   Nose: No nasal discharge.   Mouth/Throat: Mucous membranes are moist. Oropharynx is clear.   Eyes: Conjunctivae are normal.   Cardiovascular: Regular rhythm, S1 normal and S2 normal. Tachycardia present.  Pulses are strong.    Pulmonary/Chest: Effort normal. No respiratory distress.   Currently getting second neb during my initial exam with much improvement per mom, no wheezing, mild tachypnea   Abdominal: Soft. She exhibits no distension.   Musculoskeletal: Normal range of motion.   Neurological: She is alert.   Skin: Skin is warm and dry. Capillary refill takes less than 2 seconds. No rash noted.         ED Course   Procedures  Labs Reviewed - No data to display       Imaging Results    None          Medical Decision Making:   History:   I obtained history from: someone other than patient.  Old Medical Records: I decided to obtain old medical records.  Initial Assessment:   Sherry presents for emergent evaluation of URI sx and cough/wheeze this am, her exam currently is reassuring. She is getting three duoneb treatments with improvement and has already rcvd steriods. Discussed with mom, will complete nebs and reassess shortly.   Differential Diagnosis:   Acute tracheobronchitis, acute bronchospasm   ED Management:  Patient seen and examined, medications given.  Much improved after nebs. Monitored for one hour post neb with still improved aeration, tolerated PO and happy, playful. Mom given clear RTER instructions, all questions answered.                       Clinical Impression:       ICD-10-CM ICD-9-CM   1. Acute bronchospasm J98.01 519.11   2. Acute tracheobronchitis J20.9 466.0         Disposition:    Disposition: Discharged  Condition: Stable                        Nanette Brizuela MD  05/25/19 8754     Pt lives alone in private house with 5 steps to enter outside and 1 flight of stairs to negotiate inside with railing.

## 2022-09-21 NOTE — OCCUPATIONAL THERAPY INITIAL EVALUATION ADULT - PERTINENT HX OF CURRENT PROBLEM, REHAB EVAL
Pt admitted due to right foot and ankle pain. As per Podiatry note 9/21 "R foot ankle and foot Xray: No fracture, no radiological joint abnormality." "Right Foot MRI: Possible tophaceous gout in medial and posterior ankle joint (resident read), no tendon or ligamentous injury and worsened edema (radiology read)." US Duplex testing "negative for DVT."

## 2022-09-21 NOTE — DISCHARGE NOTE PROVIDER - NSDCFUADDINST_GEN_ALL_CORE_FT
1) It is important to see your primary physician as well as other necessary consultants within next 7-10 days to perform a comprehensive medical review.  Call their offices for an appointment as soon as you leave the hospital.  If you do not have a primary physician or unable to reach your PCP, contact the [facility] Physician Referral Service at [phone].  Your medical issues appear to be stable at this time, but if your symptoms recur or worsen, contact your physicians and/or return to the hospital if necessary.  If you encounter any issues or questions with your medication, call your physicians before stopping the medication.    2) Please access [facility] Patient portal (as instructed on the discharge paperwork) to access your medical records at any time after discharge.     3) start allopurinol once acute gout flare up resolves.

## 2022-09-21 NOTE — DISCHARGE NOTE NURSING/CASE MANAGEMENT/SOCIAL WORK - PATIENT PORTAL LINK FT
You can access the FollowMyHealth Patient Portal offered by [facility] by registering at the following website: [email or web address]. By joining [facility]’s FollowMyHealth portal, you will also be able to view your health information using other applications (apps) compatible with our system.

## 2022-09-21 NOTE — DISCHARGE NOTE PROVIDER - CARE PROVIDER_API CALL
Karol Bradford (DPM)  Podiatric Medicine and Surgery  [address] [place] [address]  Phone: [phone]  Fax: [phone]  Follow Up Time:

## 2022-09-21 NOTE — DISCHARGE NOTE PROVIDER - NSDCMRMEDTOKEN_GEN_ALL_CORE_FT
acetaminophen 325 mg oral tablet: 2 tab(s) orally every 6 hours, As needed, Mild Pain (1 - 3)  amLODIPine 10 mg oral tablet: 1 tab(s) orally once a day  aspirin 81 mg oral tablet, chewable: 1 tab(s) orally once a day  atorvastatin 80 mg oral tablet: 1 tab(s) orally once a day (at bedtime)  Calcium 600+D oral tablet: orally once a day  cilostazol 50 mg oral tablet: 1 tab(s) orally 2 times a day  fluticasone nasal: nasal once a day, As Needed  gabapentin 300 mg oral capsule: 1 cap(s) orally 3 times a day  lidocaine 4% topical film: Apply topically to affected area once a day - can stay on skin up to 12hrs/day  nystatin 100,000 units/g topical cream: 1 application topically 2 times a day to affected area   acetaminophen 325 mg oral tablet: 2 tab(s) orally every 6 hours, As needed, Mild Pain (1 - 3)  amLODIPine 10 mg oral tablet: 1 tab(s) orally once a day  aspirin 81 mg oral tablet, chewable: 1 tab(s) orally once a day  atorvastatin 80 mg oral tablet: 1 tab(s) orally once a day (at bedtime)  Calcium 600+D oral tablet: orally once a day  cilostazol 50 mg oral tablet: 1 tab(s) orally 2 times a day  colchicine 0.6 mg oral tablet: 1 tab(s) orally 2 times a day x 5 days  fluticasone nasal: nasal once a day, As Needed  gabapentin 300 mg oral capsule: 1 cap(s) orally 3 times a day  guaiFENesin 100 mg/5 mL oral liquid: 5 milliliter(s) orally every 6 hours, As needed, Cough  lidocaine 4% topical film: Apply topically to affected area once a day - can stay on skin up to 12hrs/day  nystatin 100,000 units/g topical cream: 1 application topically 2 times a day to affected area  predniSONE 20 mg oral tablet: 2 tab(s) orally once a day x 7 days then taper by 10 mg each week

## 2022-09-21 NOTE — PROGRESS NOTE ADULT - ASSESSMENT
89y Female with severely painful R ankle.  - Patient seen and evaluated.  - Afebrile, vitals stable  - Improved but Severe pain on palpation to the right ankle, able to demonstrate mild active ankle ROM. R foot and ankle rubor and edema from the midfoot to just proximal to the ankle, no open wound or fracture, no skin tenting. L foot no open wounds, no acute signs of infection.  - R foot ankle and foot Xray: No fracture, no radiological joint abnormality.  - ABIPVR: Right MYRON = 1.14, Left MYRON = 1.01.  - Uric acid 7.1.  - Right Foot MRI: Possible tophaceous gout in medial and posterior ankle joint (resident read), no tendon or ligamentous injury and worsened edema (radiology read).  - Continue Colchicine   - Given normal inflammation markers, clinical presentation, acute onset, elevated UA and MRI finding, highly suspect gout.   - Pt experiences pain improvement with the added Colchicine.  - Pod stable for d/c and follow up outpatient.   - follow up info in d/c provider note.   - Discussed with attending.    89y Female with severely painful R ankle.  - Patient seen and evaluated.  - Afebrile, vitals stable  - Improved but Severe pain on palpation to the right ankle, able to demonstrate mild active ankle ROM. R foot and ankle rubor and edema from the midfoot to just proximal to the ankle, no open wound or fracture, no skin tenting. L foot no open wounds, no acute signs of infection.  - R foot ankle and foot Xray: No fracture, no radiological joint abnormality.  - ABIPVR: Right MYRON = 1.14, Left MYRON = 1.01.  - Uric acid 7.1.  - Right Foot MRI: Possible tophaceous gout in medial and posterior ankle joint (resident read), no tendon or ligamentous injury and worsened edema (radiology read).  - Continue Colchicine   - Pt experiences pain improvement with the added Colchicine.  - Pod stable for d/c and follow up outpatient.   - follow up info in d/c provider note.   - Discussed with attending.

## 2022-09-21 NOTE — DISCHARGE NOTE PROVIDER - HOSPITAL COURSE
HPI: 89-yo F with hx HTN, CVA w/ left side weakness, neuropathy, Fibromyalgia & Osteoarthritis presented w/ R foot and ankle pain.    R ankle & foot pain with acute gout arthritis.   - improvement with colchicine and prednisone. cont colchicine for 7 more day and cont prednisone 40 mg daily and then taper by 10 mg every week. Outpatient podiatrist follow up is recommended. PT eval>>[facility].      HTN. controlled.   - c/w Norvasc    HLD  - c/w Lipitor    History of DVT.  no on anticoagulation.     Hx of CVA and left sided weakness. supportive care. cont aspirin and lipitor     Seen and examined by me today. Vitals stable.   DC time spent by me face to face excluding billable procedures 38 mins

## 2022-09-21 NOTE — DISCHARGE NOTE NURSING/CASE MANAGEMENT/SOCIAL WORK - NSDCFUADDAPPT_GEN_ALL_CORE_FT
Podiatry Discharge Instructions:  Follow up: Please follow up with Dr. Bradford within 1 week of discharge from the hospital, please call [phone] for appointment and discuss that you recently were seen in the hospital.  Foot Care: keep feet dry clean all the time  Weight bearing: Please non weight bearing in a surgical shoe to RIGHT foot.   Antibiotics: Please continue as instructed.

## 2022-09-21 NOTE — PROGRESS NOTE ADULT - SUBJECTIVE AND OBJECTIVE BOX
89-yo F with hx HTN, CVA w/ left side weakness, neuropathy, Fibromyalgia & Osteoarthritis presented w/ R foot and ankle pain. She is lying in bed in NAD.    MEDICATIONS  (STANDING):  amLODIPine   Tablet 10 milliGRAM(s) Oral daily  aspirin  chewable 81 milliGRAM(s) Oral daily  atorvastatin 80 milliGRAM(s) Oral at bedtime  enoxaparin Injectable 40 milliGRAM(s) Subcutaneous every 24 hours  gabapentin 300 milliGRAM(s) Oral three times a day  sodium chloride 0.9%. 1000 milliLiter(s) (75 mL/Hr) IV Continuous <Continuous>    MEDICATIONS  (PRN):  acetaminophen     Tablet .. 650 milliGRAM(s) Oral every 6 hours PRN Temp greater or equal to 38C (100.4F), Mild Pain (1 - 3)  aluminum hydroxide/magnesium hydroxide/simethicone Suspension 30 milliLiter(s) Oral every 4 hours PRN Dyspepsia  fluticasone propionate 50 MICROgram(s)/spray Nasal Spray 1 Spray(s) Both Nostrils two times a day PRN rhinorrhea  melatonin 3 milliGRAM(s) Oral at bedtime PRN Insomnia  ondansetron Injectable 4 milliGRAM(s) IV Push every 8 hours PRN Nausea and/or Vomiting  traMADol 50 milliGRAM(s) Oral every 4 hours PRN Severe Pain (7 - 10)      Allergies    No Known Allergies    Intolerances        Vital Signs Last 24 Hrs  T(C): 36.7 (18 Sep 2022 16:32), Max: 36.8 (18 Sep 2022 11:23)  T(F): 98.1 (18 Sep 2022 16:32), Max: 98.2 (18 Sep 2022 11:23)  HR: 63 (18 Sep 2022 16:32) (54 - 69)  BP: 133/77 (18 Sep 2022 16:32) (105/60 - 144/69)   RR: 17 (18 Sep 2022 16:32) (17 - 18)  SpO2: 94% (18 Sep 2022 16:32) (94% - 99%)    Parameters below as of 17 Sep 2022 23:22  Patient On (Oxygen Delivery Method): room air    PHYSICAL EXAM:  GENERAL: NAD, well-groomed, well-developed  HEAD:  Atraumatic, Normocephalic  EYES: EOMI, PERRLA   NECK: Supple   NERVOUS SYSTEM:  Alert & Oriented X3, Good concentration   CHEST/LUNG: Clear to auscultation bilaterally; No rales, rhonchi, wheezing, or rubs  HEART: Regular rate and rhythm; No murmurs, rubs, or gallops  ABDOMEN: Soft, Nontender, Nondistended; Bowel sounds present  EXTREMITIES: right foot shows no erythema or edema but is very tender to light touch       LABS:                        11.5   5.22  )-----------( 212      ( 18 Sep 2022 06:25 )             36.2     09-18    142  |  110<H>  |  18  ----------------------------<  70  3.7   |  26  |  1.15    Ca    8.6      18 Sep 2022 06:25  Phos  3.5     09-17  Mg     2.5     09-17    TPro  7.8  /  Alb  3.3  /  TBili  0.4  /  DBili  x   /  AST  23  /  ALT  15  /  AlkPhos  70  09-17    PT/INR - ( 17 Sep 2022 16:52 )   PT: 12.4 sec;   INR: 1.03 ratio         PTT - ( 17 Sep 2022 16:52 )  PTT:26.3 sec    CAPILLARY BLOOD GLUCOSE          RADIOLOGY & ADDITIONAL TESTS:    09-18-22 @ 07:01  -  09-18-22 @ 19:07  --------------------------------------------------------  IN:  Total IN: 0 mL    OUT:    Voided (mL): 800 mL  Total OUT: 800 mL    Total NET: -800 mL      
89-yo F with hx HTN, CVA w/ left side weakness, neuropathy, Fibromyalgia & Osteoarthritis presented w/ R foot and ankle pain. She is lying in bed in NAD.     MEDICATIONS  (STANDING):  amLODIPine   Tablet 10 milliGRAM(s) Oral daily  aspirin  chewable 81 milliGRAM(s) Oral daily  atorvastatin 80 milliGRAM(s) Oral at bedtime  enoxaparin Injectable 40 milliGRAM(s) SubCutaneous every 24 hours  gabapentin 300 milliGRAM(s) Oral three times a day  predniSONE   Tablet 40 milliGRAM(s) Oral daily    MEDICATIONS  (PRN):  acetaminophen     Tablet .. 650 milliGRAM(s) Oral every 6 hours PRN Temp greater or equal to 38C (100.4F), Mild Pain (1 - 3)  aluminum hydroxide/magnesium hydroxide/simethicone Suspension 30 milliLiter(s) Oral every 4 hours PRN Dyspepsia  fluticasone propionate 50 MICROgram(s)/spray Nasal Spray 1 Spray(s) Both Nostrils two times a day PRN rhinorrhea  melatonin 3 milliGRAM(s) Oral at bedtime PRN Insomnia  ondansetron Injectable 4 milliGRAM(s) IV Push every 8 hours PRN Nausea and/or Vomiting  traMADol 50 milliGRAM(s) Oral every 4 hours PRN Severe Pain (7 - 10)      Allergies    No Known Allergies    Intolerances        Vital Signs Last 24 Hrs  T(C): 36.2 (19 Sep 2022 16:57), Max: 37 (19 Sep 2022 05:27)  T(F): 97.2 (19 Sep 2022 16:57), Max: 98.6 (19 Sep 2022 05:27)  HR: 70 (19 Sep 2022 16:57) (52 - 70)  BP: 134/79 (19 Sep 2022 16:57) (131/70 - 144/83)   RR: 18 (19 Sep 2022 16:57) (16 - 18)  SpO2: 94% (19 Sep 2022 16:57) (93% - 98%)    Parameters below as of 19 Sep 2022 16:45  Patient On (Oxygen Delivery Method): room air        PHYSICAL EXAM:  GENERAL: NAD, well-groomed, well-developed  HEAD:  Atraumatic, Normocephalic  EYES: EOMI, PERRLA   NECK: Supple   NERVOUS SYSTEM:  Alert & Oriented X3, Good concentration   CHEST/LUNG: Clear to auscultation bilaterally; No rales, rhonchi, wheezing, or rubs  HEART: Regular rate and rhythm; No murmurs, rubs, or gallops  ABDOMEN: Soft, Nontender, Nondistended; Bowel sounds present  EXTREMITIES: right foot shows no erythema or edema but is very tender to light touch        MEDICATIONS  (STANDING):  amLODIPine   Tablet 10 milliGRAM(s) Oral daily  aspirin  chewable 81 milliGRAM(s) Oral daily  atorvastatin 80 milliGRAM(s) Oral at bedtime  enoxaparin Injectable 40 milliGRAM(s) SubCutaneous every 24 hours  gabapentin 300 milliGRAM(s) Oral three times a day  predniSONE   Tablet 40 milliGRAM(s) Oral daily    MEDICATIONS  (PRN):  acetaminophen     Tablet .. 650 milliGRAM(s) Oral every 6 hours PRN Temp greater or equal to 38C (100.4F), Mild Pain (1 - 3)  aluminum hydroxide/magnesium hydroxide/simethicone Suspension 30 milliLiter(s) Oral every 4 hours PRN Dyspepsia  fluticasone propionate 50 MICROgram(s)/spray Nasal Spray 1 Spray(s) Both Nostrils two times a day PRN rhinorrhea  melatonin 3 milliGRAM(s) Oral at bedtime PRN Insomnia  ondansetron Injectable 4 milliGRAM(s) IV Push every 8 hours PRN Nausea and/or Vomiting  traMADol 50 milliGRAM(s) Oral every 4 hours PRN Severe Pain (7 - 10)      Allergies    No Known Allergies    Intolerances        Vital Signs Last 24 Hrs  T(C): 36.5 (20 Sep 2022 11:53), Max: 36.5 (20 Sep 2022 11:53)  T(F): 97.7 (20 Sep 2022 11:53), Max: 97.7 (20 Sep 2022 11:53)  HR: 72 (20 Sep 2022 17:56) (60 - 82)  BP: 124/70 (20 Sep 2022 17:56) (119/67 - 131/76)  BP(mean): --  RR: 18 (20 Sep 2022 17:56) (17 - 18)  SpO2: 93% (20 Sep 2022 17:56) (93% - 94%)    Parameters below as of 20 Sep 2022 11:53  Patient On (Oxygen Delivery Method): room air        PHYSICAL EXAM:  GENERAL: NAD, well-groomed, well-developed  HEAD:  Atraumatic, Normocephalic  EYES: EOMI, PERRLA, conjunctiva and sclera clear  ENMT: No tonsillar erythema, exudates, or enlargement; Moist mucous membranes, Good dentition, No lesions  NECK: Supple, No JVD, Normal thyroid  NERVOUS SYSTEM:  Alert & Oriented X3, Good concentration; Motor Strength 5/5 B/L upper and lower extremities; DTRs 2+ intact and symmetric  CHEST/LUNG: Clear to auscultation bilaterally; No rales, rhonchi, wheezing, or rubs  HEART: Regular rate and rhythm; No murmurs, rubs, or gallops  ABDOMEN: Soft, Nontender, Nondistended; Bowel sounds present  EXTREMITIES:  2+ Peripheral Pulses, No clubbing, cyanosis, or edema  LYMPH: No lymphadenopathy noted  SKIN: No rashes or lesions    LABS:    09-19    142  |  110<H>  |  15  ----------------------------<  68<L>  4.0   |  25  |  0.98    Ca    9.1      19 Sep 2022 11:05  Phos  3.2     09-19  Mg     2.2     09-19       RADIOLOGY & ADDITIONAL TESTS:    09-19-22 @ 07:01  -  09-20-22 @ 07:00  --------------------------------------------------------  IN:    Oral Fluid: 420 mL  Total IN: 420 mL    OUT:    Voided (mL): 250 mL  Total OUT: 250 mL    Total NET: 170 mL      09-20-22 @ 07:01  -  09-20-22 @ 19:57  --------------------------------------------------------  IN:    Oral Fluid: 120 mL  Total IN: 120 mL    OUT:    Voided (mL): 250 mL  Total OUT: 250 mL    Total NET: -130 mL      
89-yo F with hx HTN, CVA w/ left side weakness, neuropathy, Fibromyalgia & Osteoarthritis presented w/ R foot and ankle pain. She is lying in bed in NAD.     MEDICATIONS  (STANDING):  amLODIPine   Tablet 10 milliGRAM(s) Oral daily  aspirin  chewable 81 milliGRAM(s) Oral daily  atorvastatin 80 milliGRAM(s) Oral at bedtime  enoxaparin Injectable 40 milliGRAM(s) SubCutaneous every 24 hours  gabapentin 300 milliGRAM(s) Oral three times a day  predniSONE   Tablet 40 milliGRAM(s) Oral daily    MEDICATIONS  (PRN):  acetaminophen     Tablet .. 650 milliGRAM(s) Oral every 6 hours PRN Temp greater or equal to 38C (100.4F), Mild Pain (1 - 3)  aluminum hydroxide/magnesium hydroxide/simethicone Suspension 30 milliLiter(s) Oral every 4 hours PRN Dyspepsia  fluticasone propionate 50 MICROgram(s)/spray Nasal Spray 1 Spray(s) Both Nostrils two times a day PRN rhinorrhea  melatonin 3 milliGRAM(s) Oral at bedtime PRN Insomnia  ondansetron Injectable 4 milliGRAM(s) IV Push every 8 hours PRN Nausea and/or Vomiting  traMADol 50 milliGRAM(s) Oral every 4 hours PRN Severe Pain (7 - 10)      Allergies    No Known Allergies    Intolerances        Vital Signs Last 24 Hrs  T(C): 36.2 (19 Sep 2022 16:57), Max: 37 (19 Sep 2022 05:27)  T(F): 97.2 (19 Sep 2022 16:57), Max: 98.6 (19 Sep 2022 05:27)  HR: 70 (19 Sep 2022 16:57) (52 - 70)  BP: 134/79 (19 Sep 2022 16:57) (131/70 - 144/83)   RR: 18 (19 Sep 2022 16:57) (16 - 18)  SpO2: 94% (19 Sep 2022 16:57) (93% - 98%)    Parameters below as of 19 Sep 2022 16:45  Patient On (Oxygen Delivery Method): room air        PHYSICAL EXAM:  GENERAL: NAD, well-groomed, well-developed  HEAD:  Atraumatic, Normocephalic  EYES: EOMI, PERRLA   NECK: Supple   NERVOUS SYSTEM:  Alert & Oriented X3, Good concentration   CHEST/LUNG: Clear to auscultation bilaterally; No rales, rhonchi, wheezing, or rubs  HEART: Regular rate and rhythm; No murmurs, rubs, or gallops  ABDOMEN: Soft, Nontender, Nondistended; Bowel sounds present  EXTREMITIES: right foot shows no erythema or edema but is very tender to light touch    LABS:                        11.5   5.22  )-----------( 212      ( 18 Sep 2022 06:25 )             36.2     09-19    142  |  110<H>  |  15  ----------------------------<  68<L>  4.0   |  25  |  0.98    Ca    9.1      19 Sep 2022 11:05  Phos  3.2     09-19  Mg     2.2     09-19         RADIOLOGY & ADDITIONAL TESTS:    09-18-22 @ 07:01  -  09-19-22 @ 07:00  --------------------------------------------------------  IN:  Total IN: 0 mL    OUT:    Voided (mL): 1400 mL  Total OUT: 1400 mL    Total NET: -1400 mL      09-19-22 @ 07:01  -  09-19-22 @ 20:00  --------------------------------------------------------  IN:    Oral Fluid: 420 mL  Total IN: 420 mL    OUT:    Voided (mL): 250 mL  Total OUT: 250 mL    Total NET: 170 mL      
Patient is a 89y old  Female who presents with a chief complaint of      INTERVAL HPI/OVERNIGHT EVENTS:  Patient seen and evaluated at bedside.  Pt is resting comfortable in NAD. Denies N/V/F/C.    Allergies    No Known Allergies    Intolerances        Vital Signs Last 24 Hrs  T(C): 36.4 (20 Sep 2022 05:09), Max: 36.6 (19 Sep 2022 16:45)  T(F): 97.6 (20 Sep 2022 05:09), Max: 97.8 (19 Sep 2022 16:45)  HR: 60 (20 Sep 2022 05:09) (60 - 82)  BP: 119/67 (20 Sep 2022 05:09) (119/67 - 136/72)  BP(mean): --  RR: 18 (20 Sep 2022 05:09) (18 - 18)  SpO2: 93% (20 Sep 2022 05:09) (93% - 98%)    Parameters below as of 20 Sep 2022 05:09  Patient On (Oxygen Delivery Method): room air        LABS:    09-19    142  |  110<H>  |  15  ----------------------------<  68<L>  4.0   |  25  |  0.98    Ca    9.1      19 Sep 2022 11:05  Phos  3.2     09-19  Mg     2.2     09-19          CAPILLARY BLOOD GLUCOSE          Lower Extremity Physical Exam:  Vascular: DP/PT 1/4 L, DP/PT impalpable 2/2 to edema R, B/L, CFT about 5 seconds B/L, Temperature gradient warm to warm R, warm to cool L.   Neuro: Epicritic sensation intact to the level of midfoot, B/L.  Musculoskeletal/Ortho: Severe pain on palpation to the right ankle, slight passive ankle ROM.  Skin: L foot no open wounds, no acute signs of infection. R foot and ankle rubor and edema from the midfoot to just proximal to the ankle. No open wound or fracture, no skin tenting.     RADIOLOGY & ADDITIONAL TESTS:  
Patient is a 89y old  Female who presents with a chief complaint of      INTERVAL HPI/OVERNIGHT EVENTS:  Patient seen and evaluated at bedside.  Pt is resting comfortable in NAD. Denies N/V/F/C.    Allergies    No Known Allergies    Intolerances        Vital Signs Last 24 Hrs  T(C): 36.4 (21 Sep 2022 05:12), Max: 36.5 (20 Sep 2022 11:53)  T(F): 97.5 (21 Sep 2022 05:12), Max: 97.7 (20 Sep 2022 11:53)  HR: 59 (21 Sep 2022 05:12) (59 - 75)  BP: 144/75 (21 Sep 2022 05:12) (124/70 - 144/75)  BP(mean): --  RR: 18 (21 Sep 2022 05:12) (17 - 18)  SpO2: 93% (21 Sep 2022 05:12) (93% - 99%)    Parameters below as of 21 Sep 2022 05:12  Patient On (Oxygen Delivery Method): room air        LABS:    09-21    144  |  113<H>  |  19  ----------------------------<  80  4.0   |  24  |  0.85    Ca    8.8      21 Sep 2022 07:11  Phos  2.9     09-21  Mg     2.0     09-21          CAPILLARY BLOOD GLUCOSE          Lower Extremity Physical Exam:    Vascular: DP/PT 1/4 L, DP/PT impalpable 2/2 to edema R, B/L, CFT about 5 seconds B/L, Temperature gradient warm to warm R, warm to cool L.   Neuro: Epicritic sensation intact to the level of midfoot, B/L.  Musculoskeletal/Ortho: Severe pain on palpation to the right ankle, slight passive ankle ROM.  Skin: Improved but Severe pain on palpation to the right ankle, able to demonstrate mild active ankle ROM. R foot and ankle rubor and edema from the midfoot to just proximal to the ankle, no open wound or fracture, no skin tenting. L foot no open wounds, no acute signs of infection.      RADIOLOGY & ADDITIONAL TESTS:

## 2022-09-26 DIAGNOSIS — I69.354 HEMIPLEGIA AND HEMIPARESIS FOLLOWING CEREBRAL INFARCTION AFFECTING LEFT NON-DOMINANT SIDE: ICD-10-CM

## 2022-09-26 DIAGNOSIS — M19.071 PRIMARY OSTEOARTHRITIS, RIGHT ANKLE AND FOOT: ICD-10-CM

## 2022-09-26 DIAGNOSIS — G62.9 POLYNEUROPATHY, UNSPECIFIED: ICD-10-CM

## 2022-09-26 DIAGNOSIS — M10.9 GOUT, UNSPECIFIED: ICD-10-CM

## 2022-09-26 DIAGNOSIS — I10 ESSENTIAL (PRIMARY) HYPERTENSION: ICD-10-CM

## 2022-09-26 DIAGNOSIS — M25.471 EFFUSION, RIGHT ANKLE: ICD-10-CM

## 2022-09-26 DIAGNOSIS — Z79.82 LONG TERM (CURRENT) USE OF ASPIRIN: ICD-10-CM

## 2022-09-26 DIAGNOSIS — M79.7 FIBROMYALGIA: ICD-10-CM

## 2022-09-26 DIAGNOSIS — Z86.718 PERSONAL HISTORY OF OTHER VENOUS THROMBOSIS AND EMBOLISM: ICD-10-CM

## 2022-09-26 DIAGNOSIS — E78.5 HYPERLIPIDEMIA, UNSPECIFIED: ICD-10-CM

## 2022-11-01 ENCOUNTER — EMERGENCY (EMERGENCY)
Facility: HOSPITAL | Age: 87
LOS: 0 days | Discharge: HOME | End: 2022-11-01
Attending: STUDENT IN AN ORGANIZED HEALTH CARE EDUCATION/TRAINING PROGRAM

## 2022-11-01 VITALS
WEIGHT: 182.1 LBS | TEMPERATURE: 100 F | RESPIRATION RATE: 16 BRPM | HEIGHT: 66 IN | DIASTOLIC BLOOD PRESSURE: 85 MMHG | HEART RATE: 95 BPM | OXYGEN SATURATION: 95 % | SYSTOLIC BLOOD PRESSURE: 177 MMHG

## 2022-11-01 VITALS
HEART RATE: 91 BPM | SYSTOLIC BLOOD PRESSURE: 162 MMHG | TEMPERATURE: 98 F | DIASTOLIC BLOOD PRESSURE: 78 MMHG | RESPIRATION RATE: 18 BRPM | OXYGEN SATURATION: 100 %

## 2022-11-01 DIAGNOSIS — I69.351 HEMIPLEGIA AND HEMIPARESIS FOLLOWING CEREBRAL INFARCTION AFFECTING RIGHT DOMINANT SIDE: ICD-10-CM

## 2022-11-01 DIAGNOSIS — M54.9 DORSALGIA, UNSPECIFIED: ICD-10-CM

## 2022-11-01 DIAGNOSIS — I10 ESSENTIAL (PRIMARY) HYPERTENSION: ICD-10-CM

## 2022-11-01 DIAGNOSIS — Z20.822 CONTACT WITH AND (SUSPECTED) EXPOSURE TO COVID-19: ICD-10-CM

## 2022-11-01 DIAGNOSIS — M79.10 MYALGIA, UNSPECIFIED SITE: ICD-10-CM

## 2022-11-01 DIAGNOSIS — G62.9 POLYNEUROPATHY, UNSPECIFIED: ICD-10-CM

## 2022-11-01 DIAGNOSIS — M19.90 UNSPECIFIED OSTEOARTHRITIS, UNSPECIFIED SITE: ICD-10-CM

## 2022-11-01 LAB
ALBUMIN SERPL ELPH-MCNC: 3 G/DL — LOW (ref 3.3–5)
ALP SERPL-CCNC: 62 U/L — SIGNIFICANT CHANGE UP (ref 40–120)
ALT FLD-CCNC: 19 U/L — SIGNIFICANT CHANGE UP (ref 12–78)
ANION GAP SERPL CALC-SCNC: 8 MMOL/L — SIGNIFICANT CHANGE UP (ref 5–17)
AST SERPL-CCNC: 32 U/L — SIGNIFICANT CHANGE UP (ref 15–37)
BASOPHILS # BLD AUTO: 0.02 K/UL — SIGNIFICANT CHANGE UP (ref 0–0.2)
BASOPHILS NFR BLD AUTO: 0.2 % — SIGNIFICANT CHANGE UP (ref 0–2)
BILIRUB SERPL-MCNC: 0.7 MG/DL — SIGNIFICANT CHANGE UP (ref 0.2–1.2)
BUN SERPL-MCNC: 13 MG/DL — SIGNIFICANT CHANGE UP (ref 7–23)
CALCIUM SERPL-MCNC: 8.6 MG/DL — SIGNIFICANT CHANGE UP (ref 8.5–10.1)
CHLORIDE SERPL-SCNC: 111 MMOL/L — HIGH (ref 96–108)
CK SERPL-CCNC: 164 U/L — SIGNIFICANT CHANGE UP (ref 26–192)
CO2 SERPL-SCNC: 24 MMOL/L — SIGNIFICANT CHANGE UP (ref 22–31)
CREAT SERPL-MCNC: 0.98 MG/DL — SIGNIFICANT CHANGE UP (ref 0.5–1.3)
EGFR: 55 ML/MIN/1.73M2 — LOW
EOSINOPHIL # BLD AUTO: 0.01 K/UL — SIGNIFICANT CHANGE UP (ref 0–0.5)
EOSINOPHIL NFR BLD AUTO: 0.1 % — SIGNIFICANT CHANGE UP (ref 0–6)
FLUAV AG NPH QL: SIGNIFICANT CHANGE UP
FLUBV AG NPH QL: SIGNIFICANT CHANGE UP
GLUCOSE SERPL-MCNC: 71 MG/DL — SIGNIFICANT CHANGE UP (ref 70–99)
HCT VFR BLD CALC: 39.5 % — SIGNIFICANT CHANGE UP (ref 34.5–45)
HGB BLD-MCNC: 12.9 G/DL — SIGNIFICANT CHANGE UP (ref 11.5–15.5)
IMM GRANULOCYTES NFR BLD AUTO: 0.6 % — SIGNIFICANT CHANGE UP (ref 0–0.9)
LYMPHOCYTES # BLD AUTO: 0.32 K/UL — LOW (ref 1–3.3)
LYMPHOCYTES # BLD AUTO: 3.6 % — LOW (ref 13–44)
MAGNESIUM SERPL-MCNC: 2.3 MG/DL — SIGNIFICANT CHANGE UP (ref 1.6–2.6)
MCHC RBC-ENTMCNC: 27.4 PG — SIGNIFICANT CHANGE UP (ref 27–34)
MCHC RBC-ENTMCNC: 32.7 G/DL — SIGNIFICANT CHANGE UP (ref 32–36)
MCV RBC AUTO: 83.9 FL — SIGNIFICANT CHANGE UP (ref 80–100)
MONOCYTES # BLD AUTO: 0.26 K/UL — SIGNIFICANT CHANGE UP (ref 0–0.9)
MONOCYTES NFR BLD AUTO: 3 % — SIGNIFICANT CHANGE UP (ref 2–14)
NEUTROPHILS # BLD AUTO: 8.12 K/UL — HIGH (ref 1.8–7.4)
NEUTROPHILS NFR BLD AUTO: 92.5 % — HIGH (ref 43–77)
NRBC # BLD: 0 /100 WBCS — SIGNIFICANT CHANGE UP (ref 0–0)
PHOSPHATE SERPL-MCNC: 1.9 MG/DL — LOW (ref 2.5–4.5)
PLATELET # BLD AUTO: 309 K/UL — SIGNIFICANT CHANGE UP (ref 150–400)
POTASSIUM SERPL-MCNC: 3.8 MMOL/L — SIGNIFICANT CHANGE UP (ref 3.5–5.3)
POTASSIUM SERPL-SCNC: 3.8 MMOL/L — SIGNIFICANT CHANGE UP (ref 3.5–5.3)
PROT SERPL-MCNC: 7.7 GM/DL — SIGNIFICANT CHANGE UP (ref 6–8.3)
RBC # BLD: 4.71 M/UL — SIGNIFICANT CHANGE UP (ref 3.8–5.2)
RBC # FLD: 15.5 % — HIGH (ref 10.3–14.5)
SARS-COV-2 RNA SPEC QL NAA+PROBE: SIGNIFICANT CHANGE UP
SODIUM SERPL-SCNC: 143 MMOL/L — SIGNIFICANT CHANGE UP (ref 135–145)
WBC # BLD: 8.78 K/UL — SIGNIFICANT CHANGE UP (ref 3.8–10.5)
WBC # FLD AUTO: 8.78 K/UL — SIGNIFICANT CHANGE UP (ref 3.8–10.5)

## 2022-11-01 PROCEDURE — 99284 EMERGENCY DEPT VISIT MOD MDM: CPT

## 2022-11-01 RX ORDER — SODIUM,POTASSIUM PHOSPHATES 278-250MG
2 POWDER IN PACKET (EA) ORAL ONCE
Refills: 0 | Status: COMPLETED | OUTPATIENT
Start: 2022-11-01 | End: 2022-11-01

## 2022-11-01 RX ORDER — LIDOCAINE 4 G/100G
1 CREAM TOPICAL ONCE
Refills: 0 | Status: COMPLETED | OUTPATIENT
Start: 2022-11-01 | End: 2022-11-01

## 2022-11-01 RX ORDER — SODIUM CHLORIDE 9 MG/ML
1000 INJECTION, SOLUTION INTRAVENOUS ONCE
Refills: 0 | Status: COMPLETED | OUTPATIENT
Start: 2022-11-01 | End: 2022-11-01

## 2022-11-01 RX ORDER — IBUPROFEN 200 MG
400 TABLET ORAL ONCE
Refills: 0 | Status: COMPLETED | OUTPATIENT
Start: 2022-11-01 | End: 2022-11-01

## 2022-11-01 RX ORDER — ACETAMINOPHEN 500 MG
975 TABLET ORAL ONCE
Refills: 0 | Status: COMPLETED | OUTPATIENT
Start: 2022-11-01 | End: 2022-11-01

## 2022-11-01 RX ADMIN — Medication 975 MILLIGRAM(S): at 02:44

## 2022-11-01 RX ADMIN — Medication 2 PACKET(S): at 04:33

## 2022-11-01 RX ADMIN — SODIUM CHLORIDE 1000 MILLILITER(S): 9 INJECTION, SOLUTION INTRAVENOUS at 02:45

## 2022-11-01 RX ADMIN — Medication 400 MILLIGRAM(S): at 02:46

## 2022-11-01 RX ADMIN — LIDOCAINE 1 PATCH: 4 CREAM TOPICAL at 02:45

## 2022-11-01 NOTE — ED PROVIDER NOTE - OBJECTIVE STATEMENT
89yo female with pmh htn, fibromyalgia, presenting with myalgias.  Initially stated she had back pain radiating down legs but now states she has diffuse body aching, worst in back.  Started earlier tonight. 89yo female with pmh htn, fibromyalgia, presenting with myalgias.  Initially stated she had back pain radiating down b/l legs but now states she has diffuse body aching, worst in back but also in arms, shoulders.  Started earlier tonight.  No trauma.  Denies fevers.  No cp, sob, numbness, weakness, headache, cough, congestion, abd pain, n/v, urinary symptoms, bowel/ bladder dysfunction, saddle anesthesia.  Did not take anything pta.

## 2022-11-01 NOTE — ED PROVIDER NOTE - PATIENT PORTAL LINK FT
You can access the FollowMyHealth Patient Portal offered by [facility] by registering at the following website: [email or web address]. By joining [facility]’s FollowMyHealth portal, you will also be able to view your health information using other applications (apps) compatible with our system.

## 2022-11-01 NOTE — ED PROVIDER NOTE - PHYSICAL EXAMINATION
General appearance: Nontoxic appearing, conversant, afebrile    Eyes: anicteric sclerae, LARS, EOMI   HENT: Atraumatic; oropharynx clear, MMM and no ulcerations, no pharyngeal erythema or exudate   Neck: Trachea midline; Full range of motion, supple   Pulm: CTA bl, normal respiratory effort and no intercostal retractions, normal work of breathing   CV: RRR, No murmurs, rubs, or gallops   Abdomen: Soft, non-tender, non-distended; no guarding or rebound   Back: No midline ttp, step offs, deformities, no cvat    Extremities: No peripheral edema, no gross deformities, FROM x4, 5/5 MS x4, gross sensation intact    Skin: Dry, normal temperature, turgor and texture; no rash   Psych: Appropriate affect, cooperative

## 2022-11-01 NOTE — ED ADULT TRIAGE NOTE - CHIEF COMPLAINT QUOTE
lower back radiating to b/l thigh x two hours, ambulatory down the stairs to stretcher as per EMS. denies falls

## 2022-11-01 NOTE — ED ADULT NURSE NOTE - OBJECTIVE STATEMENT
PT presents with bilateral flank and lower back pain. pt denies falling, fever, chest pain, SOB. pt states she is incontinent, but does not feel like she has any other problems urinating. pt has hx of HTN PT presents with bilateral thigh and lower back pain. pt denies falling, fever, chest pain, SOB. pt states she is incontinent, but does not feel like she has any other problems urinating. pt has hx of HTN

## 2022-11-01 NOTE — ED PROVIDER NOTE - CLINICAL SUMMARY MEDICAL DECISION MAKING FREE TEXT BOX
Presenting with atraumatic body/ back pain.  Neuro intact.  Possible viral syndrome.  No signs/ symptoms of bacterial infection.  Plan for labs, meds, fluids, reassess.

## 2022-11-01 NOTE — ED PROVIDER NOTE - NSFOLLOWUPINSTRUCTIONS_ED_ALL_ED_FT
Rest, drink plenty of fluids  Advance activity as tolerated  Continue all previously prescribed medications as directed  Follow up with your PMD - bring copies of your results  Return to the ER for chest pain, difficulty breathing, or other new or concerning symptoms   For pain, you may take Tylenol 975mg every six hours and supplement with ibuprofen 400mg, with food, every six hours which can be taken three hours apart from the Tylenol to have a layered effect.   You may apply lidoderm patches to the affected area for 12 hours in a 24 hour period

## 2022-11-01 NOTE — ED ADULT NURSE NOTE - NSIMPLEMENTINTERV_GEN_ALL_ED
Implemented All Fall with Harm Risk Interventions:  Hydro to call system. Call bell, personal items and telephone within reach. Instruct patient to call for assistance. Room bathroom lighting operational. Non-slip footwear when patient is off stretcher. Physically safe environment: no spills, clutter or unnecessary equipment. Stretcher in lowest position, wheels locked, appropriate side rails in place. Provide visual cue, wrist band, yellow gown, etc. Monitor gait and stability. Monitor for mental status changes and reorient to person, place, and time. Review medications for side effects contributing to fall risk. Reinforce activity limits and safety measures with patient and family. Provide visual clues: red socks.

## 2022-12-04 ENCOUNTER — INPATIENT (INPATIENT)
Facility: HOSPITAL | Age: 87
LOS: 9 days | Discharge: NURSING, REHAB OR LONG-TERM CARE | End: 2022-12-14
Attending: INTERNAL MEDICINE | Admitting: INTERNAL MEDICINE
Payer: MEDICARE

## 2022-12-04 VITALS
TEMPERATURE: 99 F | RESPIRATION RATE: 21 BRPM | WEIGHT: 184.97 LBS | DIASTOLIC BLOOD PRESSURE: 68 MMHG | HEIGHT: 66 IN | HEART RATE: 75 BPM | OXYGEN SATURATION: 97 % | SYSTOLIC BLOOD PRESSURE: 147 MMHG

## 2022-12-04 DIAGNOSIS — I63.9 CEREBRAL INFARCTION, UNSPECIFIED: ICD-10-CM

## 2022-12-04 DIAGNOSIS — I10 ESSENTIAL (PRIMARY) HYPERTENSION: ICD-10-CM

## 2022-12-04 LAB
A1C WITH ESTIMATED AVERAGE GLUCOSE RESULT: 6.1 % — HIGH (ref 4–5.6)
ALBUMIN SERPL ELPH-MCNC: 3.5 G/DL — SIGNIFICANT CHANGE UP (ref 3.3–5)
ALP SERPL-CCNC: 85 U/L — SIGNIFICANT CHANGE UP (ref 40–120)
ALT FLD-CCNC: 15 U/L — SIGNIFICANT CHANGE UP (ref 12–78)
ANION GAP SERPL CALC-SCNC: 6 MMOL/L — SIGNIFICANT CHANGE UP (ref 5–17)
APTT BLD: 32.4 SEC — SIGNIFICANT CHANGE UP (ref 27.5–35.5)
AST SERPL-CCNC: 17 U/L — SIGNIFICANT CHANGE UP (ref 15–37)
BASOPHILS # BLD AUTO: 0.02 K/UL — SIGNIFICANT CHANGE UP (ref 0–0.2)
BASOPHILS NFR BLD AUTO: 0.4 % — SIGNIFICANT CHANGE UP (ref 0–2)
BILIRUB SERPL-MCNC: 0.6 MG/DL — SIGNIFICANT CHANGE UP (ref 0.2–1.2)
BUN SERPL-MCNC: 9 MG/DL — SIGNIFICANT CHANGE UP (ref 7–23)
CALCIUM SERPL-MCNC: 9.6 MG/DL — SIGNIFICANT CHANGE UP (ref 8.5–10.1)
CHLORIDE SERPL-SCNC: 108 MMOL/L — SIGNIFICANT CHANGE UP (ref 96–108)
CO2 SERPL-SCNC: 27 MMOL/L — SIGNIFICANT CHANGE UP (ref 22–31)
CREAT SERPL-MCNC: 0.98 MG/DL — SIGNIFICANT CHANGE UP (ref 0.5–1.3)
EGFR: 55 ML/MIN/1.73M2 — LOW
EOSINOPHIL # BLD AUTO: 0.08 K/UL — SIGNIFICANT CHANGE UP (ref 0–0.5)
EOSINOPHIL NFR BLD AUTO: 1.7 % — SIGNIFICANT CHANGE UP (ref 0–6)
ESTIMATED AVERAGE GLUCOSE: 128 MG/DL — HIGH (ref 68–114)
FLUAV AG NPH QL: SIGNIFICANT CHANGE UP
FLUBV AG NPH QL: SIGNIFICANT CHANGE UP
GLUCOSE BLDC GLUCOMTR-MCNC: 69 MG/DL — LOW (ref 70–99)
GLUCOSE SERPL-MCNC: 71 MG/DL — SIGNIFICANT CHANGE UP (ref 70–99)
HCT VFR BLD CALC: 38.6 % — SIGNIFICANT CHANGE UP (ref 34.5–45)
HGB BLD-MCNC: 12.6 G/DL — SIGNIFICANT CHANGE UP (ref 11.5–15.5)
IMM GRANULOCYTES NFR BLD AUTO: 0.6 % — SIGNIFICANT CHANGE UP (ref 0–0.9)
INR BLD: 1.1 RATIO — SIGNIFICANT CHANGE UP (ref 0.88–1.16)
LACTATE SERPL-SCNC: 0.6 MMOL/L — LOW (ref 0.7–2)
LYMPHOCYTES # BLD AUTO: 1.49 K/UL — SIGNIFICANT CHANGE UP (ref 1–3.3)
LYMPHOCYTES # BLD AUTO: 31.2 % — SIGNIFICANT CHANGE UP (ref 13–44)
MCHC RBC-ENTMCNC: 27.4 PG — SIGNIFICANT CHANGE UP (ref 27–34)
MCHC RBC-ENTMCNC: 32.6 G/DL — SIGNIFICANT CHANGE UP (ref 32–36)
MCV RBC AUTO: 83.9 FL — SIGNIFICANT CHANGE UP (ref 80–100)
MONOCYTES # BLD AUTO: 0.75 K/UL — SIGNIFICANT CHANGE UP (ref 0–0.9)
MONOCYTES NFR BLD AUTO: 15.7 % — HIGH (ref 2–14)
NEUTROPHILS # BLD AUTO: 2.41 K/UL — SIGNIFICANT CHANGE UP (ref 1.8–7.4)
NEUTROPHILS NFR BLD AUTO: 50.4 % — SIGNIFICANT CHANGE UP (ref 43–77)
NRBC # BLD: 0 /100 WBCS — SIGNIFICANT CHANGE UP (ref 0–0)
PLATELET # BLD AUTO: 288 K/UL — SIGNIFICANT CHANGE UP (ref 150–400)
POTASSIUM SERPL-MCNC: 3.7 MMOL/L — SIGNIFICANT CHANGE UP (ref 3.5–5.3)
POTASSIUM SERPL-SCNC: 3.7 MMOL/L — SIGNIFICANT CHANGE UP (ref 3.5–5.3)
PROT SERPL-MCNC: 8.7 GM/DL — HIGH (ref 6–8.3)
PROTHROM AB SERPL-ACNC: 13.1 SEC — SIGNIFICANT CHANGE UP (ref 10.5–13.4)
RBC # BLD: 4.6 M/UL — SIGNIFICANT CHANGE UP (ref 3.8–5.2)
RBC # FLD: 15.2 % — HIGH (ref 10.3–14.5)
SARS-COV-2 RNA SPEC QL NAA+PROBE: SIGNIFICANT CHANGE UP
SODIUM SERPL-SCNC: 141 MMOL/L — SIGNIFICANT CHANGE UP (ref 135–145)
TROPONIN I, HIGH SENSITIVITY RESULT: 31.4 NG/L — SIGNIFICANT CHANGE UP
WBC # BLD: 4.78 K/UL — SIGNIFICANT CHANGE UP (ref 3.8–10.5)
WBC # FLD AUTO: 4.78 K/UL — SIGNIFICANT CHANGE UP (ref 3.8–10.5)

## 2022-12-04 PROCEDURE — 0042T: CPT | Mod: MA

## 2022-12-04 PROCEDURE — 71045 X-RAY EXAM CHEST 1 VIEW: CPT | Mod: 26

## 2022-12-04 PROCEDURE — 99285 EMERGENCY DEPT VISIT HI MDM: CPT

## 2022-12-04 PROCEDURE — 70498 CT ANGIOGRAPHY NECK: CPT | Mod: 26,MA

## 2022-12-04 PROCEDURE — 93880 EXTRACRANIAL BILAT STUDY: CPT | Mod: 26

## 2022-12-04 PROCEDURE — 70496 CT ANGIOGRAPHY HEAD: CPT | Mod: 26,MA

## 2022-12-04 PROCEDURE — 93010 ELECTROCARDIOGRAM REPORT: CPT

## 2022-12-04 RX ORDER — CILOSTAZOL 100 MG/1
50 TABLET ORAL
Refills: 0 | Status: DISCONTINUED | OUTPATIENT
Start: 2022-12-04 | End: 2022-12-14

## 2022-12-04 RX ORDER — ASPIRIN/CALCIUM CARB/MAGNESIUM 324 MG
300 TABLET ORAL ONCE
Refills: 0 | Status: COMPLETED | OUTPATIENT
Start: 2022-12-04 | End: 2022-12-04

## 2022-12-04 RX ORDER — PANTOPRAZOLE SODIUM 20 MG/1
40 TABLET, DELAYED RELEASE ORAL
Refills: 0 | Status: DISCONTINUED | OUTPATIENT
Start: 2022-12-04 | End: 2022-12-14

## 2022-12-04 RX ORDER — GABAPENTIN 400 MG/1
300 CAPSULE ORAL THREE TIMES A DAY
Refills: 0 | Status: DISCONTINUED | OUTPATIENT
Start: 2022-12-04 | End: 2022-12-05

## 2022-12-04 RX ORDER — ASPIRIN/CALCIUM CARB/MAGNESIUM 324 MG
325 TABLET ORAL ONCE
Refills: 0 | Status: COMPLETED | OUTPATIENT
Start: 2022-12-04 | End: 2022-12-04

## 2022-12-04 RX ORDER — HEPARIN SODIUM 5000 [USP'U]/ML
5000 INJECTION INTRAVENOUS; SUBCUTANEOUS EVERY 12 HOURS
Refills: 0 | Status: DISCONTINUED | OUTPATIENT
Start: 2022-12-04 | End: 2022-12-14

## 2022-12-04 RX ORDER — AMLODIPINE BESYLATE 2.5 MG/1
10 TABLET ORAL DAILY
Refills: 0 | Status: DISCONTINUED | OUTPATIENT
Start: 2022-12-04 | End: 2022-12-14

## 2022-12-04 RX ORDER — ASPIRIN/CALCIUM CARB/MAGNESIUM 324 MG
81 TABLET ORAL DAILY
Refills: 0 | Status: DISCONTINUED | OUTPATIENT
Start: 2022-12-04 | End: 2022-12-14

## 2022-12-04 RX ORDER — ATORVASTATIN CALCIUM 80 MG/1
80 TABLET, FILM COATED ORAL AT BEDTIME
Refills: 0 | Status: DISCONTINUED | OUTPATIENT
Start: 2022-12-04 | End: 2022-12-14

## 2022-12-04 RX ADMIN — Medication 325 MILLIGRAM(S): at 12:59

## 2022-12-04 RX ADMIN — HEPARIN SODIUM 5000 UNIT(S): 5000 INJECTION INTRAVENOUS; SUBCUTANEOUS at 17:32

## 2022-12-04 RX ADMIN — Medication 81 MILLIGRAM(S): at 15:58

## 2022-12-04 RX ADMIN — CILOSTAZOL 50 MILLIGRAM(S): 100 TABLET ORAL at 17:32

## 2022-12-04 RX ADMIN — AMLODIPINE BESYLATE 10 MILLIGRAM(S): 2.5 TABLET ORAL at 15:57

## 2022-12-04 RX ADMIN — PANTOPRAZOLE SODIUM 40 MILLIGRAM(S): 20 TABLET, DELAYED RELEASE ORAL at 15:57

## 2022-12-04 NOTE — ED ADULT TRIAGE NOTE - CHIEF COMPLAINT QUOTE
BIBA for r/o CVA. As per EMS, patient had slurred speech started 2 hours ago. Patient has a history of left sided stroke, carpal tunnel syndrome. Dr. Pablo at bedside. . Patient to go straight to CT as per Dr. Pablo. BIBA for r/o CVA. As per EMS, patient had slurred speech started 2 hours ago. Patient has a history of left sided stroke, carpal tunnel syndrome. Dr. Pablo at bedside. FS 69. Patient to go straight to CT as per Dr. Pablo.

## 2022-12-04 NOTE — H&P ADULT - PROBLEM SELECTOR PLAN 1
screening colonoscopy Neuro consult  MR head  TTE  serial troponin  ASA, Statin  neuro checks  Monitoring

## 2022-12-04 NOTE — ED PROVIDER NOTE - PHYSICAL EXAMINATION
GEN: Awake, alert, interactive, NAD.  HEAD AND NECK: NC/AT. Airway patent. Neck supple.   EYES:  Clear b/l. EOMI. PERRL.   ENT: Moist mucus membranes.   CARDIAC: Regular rate, regular rhythm. No evident pedal edema.    RESP/CHEST: Normal respiratory effort with no use of accessory muscles or retractions. Clear throughout on auscultation.  ABD: Soft, non-distended, non-tender. No rebound, no guarding.   BACK: No midline spinal TTP. No CVAT.   EXTREMITIES: Moving all extremities with no apparent deformities.   SKIN: Warm, dry, intact normal color. No rash.   NEURO: AOx3, CN II-XII grossly intact, no focal deficits.   PSYCH: Appropriate mood and affect. GEN: Awake, alert, interactive, NAD.  HEAD AND NECK: NC/AT. Airway patent. Neck supple.   EYES:  Clear b/l. EOMI. PERRL.   ENT: Moist mucus membranes.   CARDIAC: Regular rate, regular rhythm. No evident pedal edema.    RESP/CHEST: Normal respiratory effort with no use of accessory muscles or retractions. Clear throughout on auscultation.  ABD: Soft, non-distended, non-tender. No rebound, no guarding.   BACK: No midline spinal TTP. No CVAT.   EXTREMITIES: Moving all extremities with no apparent deformities.   SKIN: Warm, dry, intact normal color. No rash.   NEURO: AOx3, NIH 1: + slurred speech.   PSYCH: Appropriate mood and affect.

## 2022-12-04 NOTE — CONSULT NOTE ADULT - SUBJECTIVE AND OBJECTIVE BOX
HPI: 89 year old woman with hx of strokes, fibromyalgia, HTN, Gout, and carpal tunnel syndrome presenting with speech disturbance and chest pain. Patient says the symptoms began at 9am this morning and feels like her speech is sticking. Never had symptoms like this before. It has not resolved. At baseline she has left sided weakness from prior stroke and uses a rollator to get around. She has chronic pain and is on Tylenol 650mg (2 tabs TID PRN pain) and Lyrica 150mg TID. CT head, CTA head/neck were unremarkable for acute process. As per patient, she was told to stop aspirin by her PCP.    PMHx: strokes, fibromyalgia, HTN, Gout, and carpal tunnel syndrome  PSHx: bilateral hip replacement  FHx: none  Meds: See EMR  ROS: Slurred speech, chest pain  Social Hx: non-smoker, drinks wine once every 2-3 months.  Allergies: NKDA    Vitals: Temp 97.8F     RR 18      HR 66      /71  General: NAD, cold and in pain  Neuro Exam: AOx3. Follows commands. Mild spastic dysarthria. No aphasia. EOM intact. PERRL. VFF. Tongue is midline. Palate elevates symmetrically. Shoulder shrug is intact. No facial droop. Moving all four extremities against gravity with left sided weakness. Finger to nose intact. Reflexes symmetric and toes down. Decreased to sensation in feet in stocking glove distribution. Gait exam deferred.     NIHSS- 4    CT head, CTA head/neck, and labs reviewed

## 2022-12-04 NOTE — CONSULT NOTE ADULT - ASSESSMENT
Dysarthria  Old strokes  Fibromyalgia  HTN  Gout    - aspirin and statin for secondary stroke prevention  - MRI brain wo rand  - PT/OT  - Echo    Thank you for the consult.

## 2022-12-04 NOTE — ED PROVIDER NOTE - NIH STROKE SCALE: 8. SENSORY, QM
Anxiety    Breast cancer in situ, left    COPD (chronic obstructive pulmonary disease)    DVT (deep venous thrombosis)  history of- not presently on A/C  Graves disease    Hyperlipidemia    Hypertension    Hypothyroid    Kidney cancer, primary, with metastasis from kidney to other site, left  LEft sided- s/p nephrectomy only- no chemo or RT  Obesity (0) Normal; no sensory loss

## 2022-12-04 NOTE — H&P ADULT - NSHPPHYSICALEXAM_GEN_ALL_CORE
PHYSICAL EXAM:  GENERAL: NAD, well-groomed, well-developed  HEAD:  Atraumatic, Normocephalic  EYES:  PERRLA, conjunctiva and sclera clear  ENMT: Intact  NECK: Supple, No JVD, Normal thyroid  NERVOUS SYSTEM:  Alert & Oriented X3; Motor Strength 3/5 R; L hemiparesis  CHEST/LUNG: Clear bilaterally; No rales, rhonchi, wheezing, or rubs  HEART: Regular rate and rhythm; No murmurs, rubs, or gallops  ABDOMEN: Soft, Nontender, Nondistended; Bowel sounds present  EXTREMITIES:   No clubbing, cyanosis, or edema  SKIN: No rashes or lesions

## 2022-12-04 NOTE — H&P ADULT - NSHPLABSRESULTS_GEN_ALL_CORE
Patient is a 25 year old man with sickle cell disease who presented with right hip pain. X-ray showed findings concerning for AVN. Hematology was consulted for assistance in regards to further management.     Per primary team, patient was lying about re-filling pain medications and going from ED to ED receive them. He was discharged home before Hematology staff could assess patient.    Recommendations:  - Please have patient follow up closely with Hematologist at [facility]  - If concern for AVN remains, recommend further imaging w/ MRI as this is the more sensitive imaging modality. If findings consistent with AVN, would recommend Orthopedic Surgery consultation.   LABS:                        12.6   4.78  )-----------( 288      ( 04 Dec 2022 09:50 )             38.6     12-04    141  |  108  |  9   ----------------------------<  71  3.7   |  27  |  0.98    Ca    9.6      04 Dec 2022 11:40    TPro  8.7<H>  /  Alb  3.5  /  TBili  0.6  /  DBili  x   /  AST  17  /  ALT  15  /  AlkPhos  85  12-04    PT/INR - ( 04 Dec 2022 09:50 )   PT: 13.1 sec;   INR: 1.10 ratio         PTT - ( 04 Dec 2022 09:50 )  PTT:32.4 sec    CAPILLARY BLOOD GLUCOSE      POCT Blood Glucose.: 69 mg/dL (04 Dec 2022 09:48)      Troponin I, High Sensitivity Result: 31.4: Serial measurements of high sensitivity troponin I (hs TnI) showing       < from: US Duplex Carotid Arteries Complete, Bilateral (12.04.22 @ 14:51) >    IMPRESSION:No significant hemodynamic stenosis of either carotid artery.    < end of copied text >    < from: Xray Chest 1 View- PORTABLE-Urgent (12.04.22 @ 12:45) >    FINDINGS:Normal cardiac silhouette and normal pulmonary vasculature with   no consolidation, effusion, pneumothorax or acute osseous finding.    Increased lung markings at the left base are chronic in the absence of   clinical evidence for interstitial infiltrate.    IMPRESSION:  No acute radiographic findings and no change    < end of copied text >      < from: CT Brain Stroke Protocol (12.04.22 @ 11:09) >    IMPRESSION:      1. Chronic ischemic changes in both hemispheres with volume loss. No CT   evidence of an acute infarct or hemorrhage.  2. Widely patent head and neck vasculature, as outlined above.  3. Nonspecific patchy areas of hypoperfusion in the frontal and temporal   lobes, not matching a vascular territory of uncertain clinical   significance.    Follow-up MR imaging of the brain recommended for further assessment.      < end of copied text >

## 2022-12-04 NOTE — ED ADULT NURSE REASSESSMENT NOTE - NS ED NURSE REASSESS COMMENT FT1
pt is alert and oriented x 3 resting in bed comfortably. pt received meds as ordered and req food. pt in no acute distress at this time.

## 2022-12-04 NOTE — ED PROVIDER NOTE - CLINICAL SUMMARY MEDICAL DECISION MAKING FREE TEXT BOX
89 y/o F with a PMHx of HTN is BIBEMS as code stroke. Pt has elevated blood pressure here at the ED. Otherwise, vitals are stable. Call code stroke. Anticipate admission. 89F PMH HTN BIBEMS Code Stroke. AF, VSS other than + elevated BP. FS WNL. NIH 1. Plan: Code Stroke. Anticipate admission.

## 2022-12-04 NOTE — H&P ADULT - HISTORY OF PRESENT ILLNESS
89yo, BF with h/o CVA x3, HTN, carpal tunnel syndrome, gout is BIBEMS to the ED due to stroke-like symptoms present upon waking up this morning. Patient states associated symptoms include slurred speech and chest pain. Also notes increased weakness L side.  Last known normal was 2 days ago, however the patient states that the symptoms started 2 hours PTA. Patient reports that she woke up with the symptoms. Patient notes that she was feeling normal up until last night. EMS states that the patient had an unsteady gait and required assistance.  Denies, SOB ,PND, cough, palpitation, n/v/d, fever, chills, abdominal pain, dysuria, HA, dizziness.

## 2022-12-04 NOTE — ED PROVIDER NOTE - PROGRESS NOTE DETAILS
D/w Radio: Negative CT plain. CTA negative for LVO, CT perfusion w/o clinically significant mismatch. Will admit to Tele (d/w Dr Ruby), Neuro (Dr Rosalba Mao) consulted. Pt updated to results, admission. She understands / agrees w/ this plan. CTA negative for LVO, CT perfusion w/o clinically significant mismatch. Will admit to Tele (d/w Dr Ruby), Neuro (Dr Rosalba Mao) consulted. Pt failed dysphagia screen. Pt updated to results, admission. She understands / agrees w/ this plan.

## 2022-12-05 LAB
ANION GAP SERPL CALC-SCNC: 7 MMOL/L — SIGNIFICANT CHANGE UP (ref 5–17)
BUN SERPL-MCNC: 8 MG/DL — SIGNIFICANT CHANGE UP (ref 7–23)
CALCIUM SERPL-MCNC: 9.1 MG/DL — SIGNIFICANT CHANGE UP (ref 8.5–10.1)
CHLORIDE SERPL-SCNC: 109 MMOL/L — HIGH (ref 96–108)
CHOLEST SERPL-MCNC: 147 MG/DL — SIGNIFICANT CHANGE UP
CO2 SERPL-SCNC: 24 MMOL/L — SIGNIFICANT CHANGE UP (ref 22–31)
CREAT SERPL-MCNC: 0.98 MG/DL — SIGNIFICANT CHANGE UP (ref 0.5–1.3)
EGFR: 55 ML/MIN/1.73M2 — LOW
GLUCOSE BLDC GLUCOMTR-MCNC: 173 MG/DL — HIGH (ref 70–99)
GLUCOSE BLDC GLUCOMTR-MCNC: 94 MG/DL — SIGNIFICANT CHANGE UP (ref 70–99)
GLUCOSE SERPL-MCNC: 64 MG/DL — LOW (ref 70–99)
HCT VFR BLD CALC: 38.8 % — SIGNIFICANT CHANGE UP (ref 34.5–45)
HDLC SERPL-MCNC: 45 MG/DL — LOW
HGB BLD-MCNC: 12.5 G/DL — SIGNIFICANT CHANGE UP (ref 11.5–15.5)
LIPID PNL WITH DIRECT LDL SERPL: 83 MG/DL — SIGNIFICANT CHANGE UP
MCHC RBC-ENTMCNC: 26.8 PG — LOW (ref 27–34)
MCHC RBC-ENTMCNC: 32.2 G/DL — SIGNIFICANT CHANGE UP (ref 32–36)
MCV RBC AUTO: 83.3 FL — SIGNIFICANT CHANGE UP (ref 80–100)
NON HDL CHOLESTEROL: 102 MG/DL — SIGNIFICANT CHANGE UP
PLATELET # BLD AUTO: 303 K/UL — SIGNIFICANT CHANGE UP (ref 150–400)
POTASSIUM SERPL-MCNC: 3.6 MMOL/L — SIGNIFICANT CHANGE UP (ref 3.5–5.3)
POTASSIUM SERPL-SCNC: 3.6 MMOL/L — SIGNIFICANT CHANGE UP (ref 3.5–5.3)
RBC # BLD: 4.66 M/UL — SIGNIFICANT CHANGE UP (ref 3.8–5.2)
RBC # FLD: 15 % — HIGH (ref 10.3–14.5)
SODIUM SERPL-SCNC: 140 MMOL/L — SIGNIFICANT CHANGE UP (ref 135–145)
TRIGL SERPL-MCNC: 97 MG/DL — SIGNIFICANT CHANGE UP
WBC # BLD: 4.78 K/UL — SIGNIFICANT CHANGE UP (ref 3.8–10.5)
WBC # FLD AUTO: 4.78 K/UL — SIGNIFICANT CHANGE UP (ref 3.8–10.5)

## 2022-12-05 RX ORDER — GLUCAGON INJECTION, SOLUTION 0.5 MG/.1ML
1 INJECTION, SOLUTION SUBCUTANEOUS ONCE
Refills: 0 | Status: DISCONTINUED | OUTPATIENT
Start: 2022-12-05 | End: 2022-12-14

## 2022-12-05 RX ORDER — DEXTROSE 50 % IN WATER 50 %
12.5 SYRINGE (ML) INTRAVENOUS ONCE
Refills: 0 | Status: DISCONTINUED | OUTPATIENT
Start: 2022-12-05 | End: 2022-12-08

## 2022-12-05 RX ORDER — DEXTROSE 50 % IN WATER 50 %
15 SYRINGE (ML) INTRAVENOUS ONCE
Refills: 0 | Status: DISCONTINUED | OUTPATIENT
Start: 2022-12-05 | End: 2022-12-08

## 2022-12-05 RX ORDER — DEXTROSE 50 % IN WATER 50 %
25 SYRINGE (ML) INTRAVENOUS ONCE
Refills: 0 | Status: DISCONTINUED | OUTPATIENT
Start: 2022-12-05 | End: 2022-12-14

## 2022-12-05 RX ORDER — SODIUM CHLORIDE 9 MG/ML
1000 INJECTION, SOLUTION INTRAVENOUS
Refills: 0 | Status: DISCONTINUED | OUTPATIENT
Start: 2022-12-05 | End: 2022-12-14

## 2022-12-05 RX ORDER — DEXTROSE 50 % IN WATER 50 %
25 SYRINGE (ML) INTRAVENOUS ONCE
Refills: 0 | Status: DISCONTINUED | OUTPATIENT
Start: 2022-12-05 | End: 2022-12-08

## 2022-12-05 RX ADMIN — PANTOPRAZOLE SODIUM 40 MILLIGRAM(S): 20 TABLET, DELAYED RELEASE ORAL at 06:15

## 2022-12-05 RX ADMIN — Medication 81 MILLIGRAM(S): at 11:19

## 2022-12-05 RX ADMIN — AMLODIPINE BESYLATE 10 MILLIGRAM(S): 2.5 TABLET ORAL at 06:15

## 2022-12-05 RX ADMIN — ATORVASTATIN CALCIUM 80 MILLIGRAM(S): 80 TABLET, FILM COATED ORAL at 21:59

## 2022-12-05 RX ADMIN — CILOSTAZOL 50 MILLIGRAM(S): 100 TABLET ORAL at 18:18

## 2022-12-05 RX ADMIN — HEPARIN SODIUM 5000 UNIT(S): 5000 INJECTION INTRAVENOUS; SUBCUTANEOUS at 18:18

## 2022-12-05 RX ADMIN — SODIUM CHLORIDE 30 MILLILITER(S): 9 INJECTION, SOLUTION INTRAVENOUS at 18:19

## 2022-12-05 RX ADMIN — CILOSTAZOL 50 MILLIGRAM(S): 100 TABLET ORAL at 06:15

## 2022-12-05 RX ADMIN — HEPARIN SODIUM 5000 UNIT(S): 5000 INJECTION INTRAVENOUS; SUBCUTANEOUS at 06:16

## 2022-12-05 NOTE — PHYSICAL THERAPY INITIAL EVALUATION ADULT - COORDINATION ASSESSED, REHAB EVAL
Asthma Control Test (ACT) Total Entered On:  3/31/2020 3:08 PM CDT    Performed On:  3/31/2020 3:08 PM CDT by Celeste Rey CMA               Asthma Control Test (ACT) Total   Asthma Control Test Total (Adult) :   18    Celeste Rey CMA - 3/31/2020 3:08 PM CDT  
impaired , c/o aches and pains in the UE and LE muscles and joints/finger to nose/heel to shin

## 2022-12-05 NOTE — PHYSICAL THERAPY INITIAL EVALUATION ADULT - LIVES WITH, PROFILE
Pt states  he lives in pvt house alone, 5 steps with rails to enter the house, once inside she stays on main floor, she does not have any family   support,

## 2022-12-05 NOTE — PHYSICAL THERAPY INITIAL EVALUATION ADULT - GENERAL OBSERVATIONS, REHAB EVAL
Pt is alert, oriented x 4, follow instructions, on room air, c/o generalized weakness and aches and pains in the UE and LE aggravated by movement , palpation and exertion.

## 2022-12-05 NOTE — PHYSICAL THERAPY INITIAL EVALUATION ADULT - PATIENT/FAMILY/SIGNIFICANT OTHER GOALS STATEMENT, PT EVAL
Pt states "I want to feel better and stronger, I want to walk again using my walker safely, I don't want to fall."

## 2022-12-05 NOTE — ED ADULT NURSE REASSESSMENT NOTE - NS ED NURSE REASSESS COMMENT FT1
pt received from previous shift resting on stretcher currently sleeping, continues to await a bed . NAD will continue to monitor .

## 2022-12-05 NOTE — PATIENT PROFILE ADULT - FALL HARM RISK - FACTORS
Neurology follow up note    GINA [id number]      Interval History:    Patient feels ok no new complaints.    Allergies    No Known Allergies    Intolerances        MEDICATIONS    acetaminophen     Tablet .. 650 milliGRAM(s) Oral every 6 hours PRN  aluminum hydroxide/magnesium hydroxide/simethicone Suspension 30 milliLiter(s) Oral every 4 hours PRN  apixaban 10 milliGRAM(s) Oral every 12 hours  influenza  Vaccine (HIGH DOSE) 0.7 milliLiter(s) IntraMuscular once  lactobacillus acidophilus 1 Tablet(s) Oral two times a day  LORazepam   Injectable 1 milliGRAM(s) IV Push once PRN  LORazepam   Injectable 1 milliGRAM(s) IV Push once PRN  losartan 25 milliGRAM(s) Oral daily  melatonin 3 milliGRAM(s) Oral at bedtime PRN  OLANZapine Injectable 2.5 milliGRAM(s) IntraMuscular every 6 hours PRN  ondansetron Injectable 4 milliGRAM(s) IV Push every 8 hours PRN  pantoprazole    Tablet 40 milliGRAM(s) Oral before breakfast  vancomycin    Solution 125 milliGRAM(s) Oral every 6 hours              Vital Signs Last 24 Hrs  T(C): 36.7 (25 Jan 2022 04:46), Max: 36.7 (25 Jan 2022 04:46)  T(F): 98 (25 Jan 2022 04:46), Max: 98 (25 Jan 2022 04:46)  HR: 78 (25 Jan 2022 04:46) (74 - 82)  BP: 115/74 (25 Jan 2022 04:46) (113/70 - 151/68)  BP(mean): --  RR: 16 (25 Jan 2022 04:46) (16 - 18)  SpO2: 92% (25 Jan 2022 04:46) (92% - 95%)    REVIEW OF SYSTEMS:  Limited secondary to the patient has forgetfulness, but constitutionally, she denies fevers, chills, or night sweats.  Head:  No headaches.  Eyes:  No double vision or blurry vision.  Ears:  No ringing in the ears.  Neck:  No neck pain.  Respiratory:  No shortness of breath.  Cardiovascular:  No chest pain.  Abdomen:  No nausea, vomiting, or abdominal pain.  Genitourinary:  Does have a history of urinary incontinence.  Extremities/Neurological:  No numbness or tingling.  Musculoskeletal:  Occasional joint pain.    PHYSICAL EXAMINATION:   HEENT:  Head:  Normocephalic, atraumatic.  Eyes:  No scleral icterus.  Ears:  Hearing bilaterally intact.  NECK:  Supple.  RESPIRATORY:  Good air entry bilaterally.  CARDIOVASCULAR:  S1 and S2 heard.  ABDOMEN:  Soft and nontender.  EXTREMITIES:  No clubbing or cyanosis was noted.      NEUROLOGIC:  The patient is less interactive today  Extraocular movements were intact.  Speech was fluent.  Smile was symmetric.  Motor:  Bilateral upper were 4/5, bilateral lower were 3/5.  At present, is not ambulatory as per my conversation with the daughter.               LABS:            Hemoglobin A1C:       Vitamin B12         RADIOLOGY    ANALYSIS AND PLAN:  An 85-year-old with an episode of memory issues.  For episode of memory issues, suspect most likely the patient does have mild cognitive impairment versus more probable underlying dementia.  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  If the patient is able to go for it, will give Ativan patient refusing to go for repeat mri --   For mild cognitive impairment versus subtle dementia supportive treatment for now   For history of hypertension, monitor systolic blood pressure.  For periods of agitation, I would recommend a psychiatry follow-up.  spoke to outside neurologist DR Arceo  1/18  neurologic wise stable for discharge   left hand pain will check x ray ? OA vs gout   less interactive today with will check labs     Spoke with the daughter at the bedside, her name is Cris, her cell number is [phone], the home number is [phone]. 1/24  She does understand while in the hospital setting, she may become more disoriented.    Greater than 30 minutes of time was spent with the patient, plan of care, reviewing data, with greater than 50% of the visit was spent counseling and/or coordinating care with multidisciplinary healthcare team   Impaired gait/Weakness

## 2022-12-05 NOTE — PHYSICAL THERAPY INITIAL EVALUATION ADULT - PERTINENT HX OF CURRENT PROBLEM, REHAB EVAL
Pt is admitted with  stroke like symptoms- slurred speech, weakness in the left side, decreased ambulation balance , unsteady gait,  CT angio 12/4 Chronic ischemia  both hemispheres: no CT evidence of acute infarct. Presently c/o multiple aches and pains in the UE and LE aggravated by movement, exertion, doing functional tasks.

## 2022-12-05 NOTE — PHYSICAL THERAPY INITIAL EVALUATION ADULT - ADDITIONAL COMMENTS
Pt states prior to admission she is independent in ADLs at home and ambulated either rolling walker or rollator, she does her own grocery shopping.

## 2022-12-05 NOTE — PHYSICAL THERAPY INITIAL EVALUATION ADULT - STRENGTHENING, PT EVAL
Improve strength in the UE and LE to 5/5, endurance to fair or good and be able to perform functional tasks-bed mobility, sitting, standing, transfers and ambulate in a safe manner with or assistive device and prevent falls.

## 2022-12-05 NOTE — ED ADULT NURSE REASSESSMENT NOTE - NS ED NURSE REASSESS COMMENT FT1
pt requesting on speak to pt advocate due to feeling unhappy with the climate In the ed. pt was offered extra blankets and warm packs.

## 2022-12-05 NOTE — PHYSICAL THERAPY INITIAL EVALUATION ADULT - IMPAIRMENTS FOUND, PT EVAL
aerobic capacity/endurance/ergonomics and body mechanics/gait, locomotion, and balance/muscle strength/neuromotor development and sensory integration

## 2022-12-05 NOTE — CHART NOTE - NSCHARTNOTEFT_GEN_A_CORE
Update note requested per DR. Ruby.     89yo, BF with h/o CVA x3, HTN, carpal tunnel syndrome, gout p/w slurred speech/chest pain. Admitted with r/o CVA. neurology consult appreciated.     Plan per Dr. Ruby  -MRI, TTE, PT pending  -asa/plavix

## 2022-12-05 NOTE — PATIENT PROFILE ADULT - FALL HARM RISK - HARM RISK INTERVENTIONS

## 2022-12-06 LAB
GLUCOSE BLDC GLUCOMTR-MCNC: 100 MG/DL — HIGH (ref 70–99)
GLUCOSE BLDC GLUCOMTR-MCNC: 106 MG/DL — HIGH (ref 70–99)
GLUCOSE BLDC GLUCOMTR-MCNC: 112 MG/DL — HIGH (ref 70–99)

## 2022-12-06 PROCEDURE — 93306 TTE W/DOPPLER COMPLETE: CPT | Mod: 26

## 2022-12-06 PROCEDURE — 70551 MRI BRAIN STEM W/O DYE: CPT | Mod: 26

## 2022-12-06 RX ORDER — ACETAMINOPHEN 500 MG
650 TABLET ORAL EVERY 6 HOURS
Refills: 0 | Status: DISCONTINUED | OUTPATIENT
Start: 2022-12-06 | End: 2022-12-14

## 2022-12-06 RX ORDER — NYSTATIN CREAM 100000 [USP'U]/G
1 CREAM TOPICAL
Refills: 0 | Status: DISCONTINUED | OUTPATIENT
Start: 2022-12-06 | End: 2022-12-14

## 2022-12-06 RX ADMIN — CILOSTAZOL 50 MILLIGRAM(S): 100 TABLET ORAL at 05:55

## 2022-12-06 RX ADMIN — ATORVASTATIN CALCIUM 80 MILLIGRAM(S): 80 TABLET, FILM COATED ORAL at 21:07

## 2022-12-06 RX ADMIN — PANTOPRAZOLE SODIUM 40 MILLIGRAM(S): 20 TABLET, DELAYED RELEASE ORAL at 05:58

## 2022-12-06 RX ADMIN — Medication 81 MILLIGRAM(S): at 13:23

## 2022-12-06 RX ADMIN — HEPARIN SODIUM 5000 UNIT(S): 5000 INJECTION INTRAVENOUS; SUBCUTANEOUS at 17:29

## 2022-12-06 RX ADMIN — NYSTATIN CREAM 1 APPLICATION(S): 100000 CREAM TOPICAL at 18:32

## 2022-12-06 RX ADMIN — Medication 650 MILLIGRAM(S): at 22:16

## 2022-12-06 RX ADMIN — AMLODIPINE BESYLATE 10 MILLIGRAM(S): 2.5 TABLET ORAL at 05:55

## 2022-12-06 RX ADMIN — CILOSTAZOL 50 MILLIGRAM(S): 100 TABLET ORAL at 17:29

## 2022-12-06 RX ADMIN — HEPARIN SODIUM 5000 UNIT(S): 5000 INJECTION INTRAVENOUS; SUBCUTANEOUS at 05:58

## 2022-12-06 NOTE — OCCUPATIONAL THERAPY INITIAL EVALUATION ADULT - RANGE OF MOTION EXAMINATION, LOWER EXTREMITY
Pt has decreased concentric & eccentric control  in LLE/Left LE Passive ROM was WFL (w/i functional limits)/Left LE Active Assistive ROM was WFL (within functional limits)/Right LE Active ROM was WFL   (within functional limits)/Right LE Active Assistive ROM was WFL  (within functional limits)

## 2022-12-06 NOTE — OCCUPATIONAL THERAPY INITIAL EVALUATION ADULT - GENERAL OBSERVATIONS, REHAB EVAL
Pt was seen for initial OT consult, encountered in bed on cardiac monitoring in NAD. IV is infusing in RUE. Pt was AA&Ox4, cooperative & followed commands. Pt makes needs & wants known. Dexterity and fine motor skills are diminished in both hands which are kept in a hooked position. Pt c/o pain in BUE due to h/o carpal Tunnel and Fibromyalgia. Pain along with new left side weakness from recent CVA limit pt's activity tolerance ,balance, ADL management and functional mobility.

## 2022-12-06 NOTE — OCCUPATIONAL THERAPY INITIAL EVALUATION ADULT - PLANNED THERAPY INTERVENTIONS, OT EVAL
energy conservation techniques/ADL retraining/IADL retraining/balance training/bed mobility training/fine motor coordination training/joint mobilization/motor coordination training/neuromuscular re-education/parent/caregiver training.../ROM/strengthening/stretching/transfer training

## 2022-12-06 NOTE — OCCUPATIONAL THERAPY INITIAL EVALUATION ADULT - LIVES WITH, PROFILE
Pt lives alone in a private house with multiple steps to enter. Pt generally enters from the back ic5 5 steps from the street and another 5 steps leading to the backdoor with bilateral hand rails that cannot be reached simultaneously. Once inside, pt has to negotiate a flight of stairs, with 10 steps left ascending handrail to access the bedroom and bathroom. The bathroom has a tub/shower combination, grab bra , tub bench, fixed shower head and standard toilet with elevated seat. Pt keep as Quad cane and rollator on each level. . Pt lives alone in a private house with multiple steps to enter. Pt generally enters from the back which has 5 steps from the street and another 5 steps leading to the backdoor with bilateral hand rails that cannot be reached simultaneously. Once inside, pt has to negotiate a flight of stairs, with 10 steps left ascending handrail to access the bedroom and bathroom. The bathroom has a tub/shower combination, grab bars, tub bench, fixed shower head and standard toilet with elevated seat. Pt keeps a Quad cane and rollator on each level. .

## 2022-12-06 NOTE — OCCUPATIONAL THERAPY INITIAL EVALUATION ADULT - PERTINENT HX OF CURRENT PROBLEM, REHAB EVAL
Pt is a 89 y/o female who presented to ER due to acute onset of neurological deficits. Pt is diagnosed with slurred speech. MRI on 12/6/22 results confirm two punctate acute infarcts within the right frontal lobe. Nonspecific findings, most commonly suggestive of prior chronic   lacunar infarcts and chronic small vessel ischemia. Few prior chronic   microhemorrhages are noted.

## 2022-12-06 NOTE — OCCUPATIONAL THERAPY INITIAL EVALUATION ADULT - RANGE OF MOTION EXAMINATION, UPPER EXTREMITY
ROM in both shoulder 0-75 degrees with pain. Pt has decreased concentric & eccentric control in LUE/bilateral UE Passive ROM was WFL  (within functional limits)/bilateral UE Active Assistive ROM was WFL  (within functional limits)

## 2022-12-06 NOTE — OCCUPATIONAL THERAPY INITIAL EVALUATION ADULT - BALANCE TRAINING, PT EVAL
Pt will increased standing balance from poor+ to fair+ in 12 weeks to prevent falls, optimize pt's ability for ADL management & safely navigate in all terrains

## 2022-12-06 NOTE — OCCUPATIONAL THERAPY INITIAL EVALUATION ADULT - PRECAUTIONS/LIMITATIONS, REHAB EVAL
Monitor vital signs with activities; left knee buckle due to weakness. Pt has a history of multiple comorbidities and diminished reaction time due to age related changes/cardiac precautions/fall precautions Monitor vital signs with activities; left knee adore due to weakness. Pt has a history of multiple comorbidities and diminished reaction time due to age related changes/cardiac precautions/fall precautions

## 2022-12-06 NOTE — OCCUPATIONAL THERAPY INITIAL EVALUATION ADULT - ADDITIONAL COMMENTS
Prior to admission, pt was functioning in her roles, self sufficient & ambulating independently at home and in the community with a Quad cane / rollator. Presently pt needs assistance with upper/ lower body self care tasks to due weakness  pain, weakness, stiffness and decreased ROM . Pt is right hand dominant and wears glasses for reading. Prior to admission, pt was functioning in her roles, self sufficient & ambulating independently at home and in the community with a Quad cane / rollator. Presently, pt needs assistance with upper/ lower body self care tasks to due weakness  pain, weakness, stiffness and decreased ROM . Pt is right hand dominant and wears glasses for reading.

## 2022-12-06 NOTE — OCCUPATIONAL THERAPY INITIAL EVALUATION ADULT - BALANCE DISTURBANCE, IDENTIFIED IMPAIRMENT CONTRIBUTE, REHAB EVAL
impaired coordination/impaired motor control/pain/impaired postural control/decreased ROM/decreased sensation/impaired sensory feedback/decreased strength

## 2022-12-06 NOTE — OCCUPATIONAL THERAPY INITIAL EVALUATION ADULT - PERSONAL SAFETY AND JUDGMENT, REHAB EVAL
Pt needs verbal cues for proper hand placement  during mobility tasks and to safely maneuver rolling walker./impaired

## 2022-12-06 NOTE — CHART NOTE - NSCHARTNOTEFT_GEN_A_CORE
Noted duplicate consult placed on 12/6/22. PT evaluated on 12/5 -- recommendation of Sub-Acute Rehab. No significant change in medical/functional status reported to warrant re-evaluation. Will continue to keep in PT programme.

## 2022-12-06 NOTE — OCCUPATIONAL THERAPY INITIAL EVALUATION ADULT - SOCIAL CONCERNS
Pt voiced concerns about her recovery , the loss of ability to ambulate and care for herself independently. Pt stated " I have no friend or family support"./Complex psychosocial needs/coping issues

## 2022-12-07 LAB
ANION GAP SERPL CALC-SCNC: 6 MMOL/L — SIGNIFICANT CHANGE UP (ref 5–17)
BUN SERPL-MCNC: 13 MG/DL — SIGNIFICANT CHANGE UP (ref 7–23)
CALCIUM SERPL-MCNC: 8.8 MG/DL — SIGNIFICANT CHANGE UP (ref 8.5–10.1)
CHLORIDE SERPL-SCNC: 110 MMOL/L — HIGH (ref 96–108)
CO2 SERPL-SCNC: 25 MMOL/L — SIGNIFICANT CHANGE UP (ref 22–31)
CREAT SERPL-MCNC: 1.01 MG/DL — SIGNIFICANT CHANGE UP (ref 0.5–1.3)
EGFR: 53 ML/MIN/1.73M2 — LOW
FLUAV AG NPH QL: SIGNIFICANT CHANGE UP
FLUBV AG NPH QL: SIGNIFICANT CHANGE UP
GLUCOSE BLDC GLUCOMTR-MCNC: 81 MG/DL — SIGNIFICANT CHANGE UP (ref 70–99)
GLUCOSE BLDC GLUCOMTR-MCNC: 84 MG/DL — SIGNIFICANT CHANGE UP (ref 70–99)
GLUCOSE BLDC GLUCOMTR-MCNC: 91 MG/DL — SIGNIFICANT CHANGE UP (ref 70–99)
GLUCOSE BLDC GLUCOMTR-MCNC: 93 MG/DL — SIGNIFICANT CHANGE UP (ref 70–99)
GLUCOSE SERPL-MCNC: 78 MG/DL — SIGNIFICANT CHANGE UP (ref 70–99)
HCT VFR BLD CALC: 34.6 % — SIGNIFICANT CHANGE UP (ref 34.5–45)
HGB BLD-MCNC: 11 G/DL — LOW (ref 11.5–15.5)
MCHC RBC-ENTMCNC: 26.8 PG — LOW (ref 27–34)
MCHC RBC-ENTMCNC: 31.8 G/DL — LOW (ref 32–36)
MCV RBC AUTO: 84.2 FL — SIGNIFICANT CHANGE UP (ref 80–100)
NRBC # BLD: 0 /100 WBCS — SIGNIFICANT CHANGE UP (ref 0–0)
PLATELET # BLD AUTO: 328 K/UL — SIGNIFICANT CHANGE UP (ref 150–400)
POTASSIUM SERPL-MCNC: 3.6 MMOL/L — SIGNIFICANT CHANGE UP (ref 3.5–5.3)
POTASSIUM SERPL-SCNC: 3.6 MMOL/L — SIGNIFICANT CHANGE UP (ref 3.5–5.3)
RBC # BLD: 4.11 M/UL — SIGNIFICANT CHANGE UP (ref 3.8–5.2)
RBC # FLD: 15.2 % — HIGH (ref 10.3–14.5)
SARS-COV-2 RNA SPEC QL NAA+PROBE: SIGNIFICANT CHANGE UP
SODIUM SERPL-SCNC: 141 MMOL/L — SIGNIFICANT CHANGE UP (ref 135–145)
WBC # BLD: 4.1 K/UL — SIGNIFICANT CHANGE UP (ref 3.8–10.5)
WBC # FLD AUTO: 4.1 K/UL — SIGNIFICANT CHANGE UP (ref 3.8–10.5)

## 2022-12-07 RX ADMIN — PANTOPRAZOLE SODIUM 40 MILLIGRAM(S): 20 TABLET, DELAYED RELEASE ORAL at 05:09

## 2022-12-07 RX ADMIN — HEPARIN SODIUM 5000 UNIT(S): 5000 INJECTION INTRAVENOUS; SUBCUTANEOUS at 18:19

## 2022-12-07 RX ADMIN — CILOSTAZOL 50 MILLIGRAM(S): 100 TABLET ORAL at 05:09

## 2022-12-07 RX ADMIN — ATORVASTATIN CALCIUM 80 MILLIGRAM(S): 80 TABLET, FILM COATED ORAL at 21:17

## 2022-12-07 RX ADMIN — NYSTATIN CREAM 1 APPLICATION(S): 100000 CREAM TOPICAL at 18:19

## 2022-12-07 RX ADMIN — CILOSTAZOL 50 MILLIGRAM(S): 100 TABLET ORAL at 18:19

## 2022-12-07 RX ADMIN — NYSTATIN CREAM 1 APPLICATION(S): 100000 CREAM TOPICAL at 05:11

## 2022-12-07 RX ADMIN — AMLODIPINE BESYLATE 10 MILLIGRAM(S): 2.5 TABLET ORAL at 05:16

## 2022-12-07 RX ADMIN — Medication 81 MILLIGRAM(S): at 11:17

## 2022-12-07 RX ADMIN — HEPARIN SODIUM 5000 UNIT(S): 5000 INJECTION INTRAVENOUS; SUBCUTANEOUS at 05:07

## 2022-12-08 LAB
ANION GAP SERPL CALC-SCNC: 5 MMOL/L — SIGNIFICANT CHANGE UP (ref 5–17)
BUN SERPL-MCNC: 11 MG/DL — SIGNIFICANT CHANGE UP (ref 7–23)
CALCIUM SERPL-MCNC: 8.8 MG/DL — SIGNIFICANT CHANGE UP (ref 8.5–10.1)
CHLORIDE SERPL-SCNC: 111 MMOL/L — HIGH (ref 96–108)
CO2 SERPL-SCNC: 24 MMOL/L — SIGNIFICANT CHANGE UP (ref 22–31)
CREAT SERPL-MCNC: 1.01 MG/DL — SIGNIFICANT CHANGE UP (ref 0.5–1.3)
EGFR: 53 ML/MIN/1.73M2 — LOW
GLUCOSE BLDC GLUCOMTR-MCNC: 106 MG/DL — HIGH (ref 70–99)
GLUCOSE BLDC GLUCOMTR-MCNC: 77 MG/DL — SIGNIFICANT CHANGE UP (ref 70–99)
GLUCOSE SERPL-MCNC: 73 MG/DL — SIGNIFICANT CHANGE UP (ref 70–99)
HCT VFR BLD CALC: 36.1 % — SIGNIFICANT CHANGE UP (ref 34.5–45)
HGB BLD-MCNC: 11.6 G/DL — SIGNIFICANT CHANGE UP (ref 11.5–15.5)
MCHC RBC-ENTMCNC: 27 PG — SIGNIFICANT CHANGE UP (ref 27–34)
MCHC RBC-ENTMCNC: 32.1 G/DL — SIGNIFICANT CHANGE UP (ref 32–36)
MCV RBC AUTO: 84.1 FL — SIGNIFICANT CHANGE UP (ref 80–100)
NRBC # BLD: 0 /100 WBCS — SIGNIFICANT CHANGE UP (ref 0–0)
PLATELET # BLD AUTO: 346 K/UL — SIGNIFICANT CHANGE UP (ref 150–400)
POTASSIUM SERPL-MCNC: 3.7 MMOL/L — SIGNIFICANT CHANGE UP (ref 3.5–5.3)
POTASSIUM SERPL-SCNC: 3.7 MMOL/L — SIGNIFICANT CHANGE UP (ref 3.5–5.3)
RBC # BLD: 4.29 M/UL — SIGNIFICANT CHANGE UP (ref 3.8–5.2)
RBC # FLD: 15.2 % — HIGH (ref 10.3–14.5)
SODIUM SERPL-SCNC: 140 MMOL/L — SIGNIFICANT CHANGE UP (ref 135–145)
WBC # BLD: 4.74 K/UL — SIGNIFICANT CHANGE UP (ref 3.8–10.5)
WBC # FLD AUTO: 4.74 K/UL — SIGNIFICANT CHANGE UP (ref 3.8–10.5)

## 2022-12-08 RX ADMIN — NYSTATIN CREAM 1 APPLICATION(S): 100000 CREAM TOPICAL at 18:49

## 2022-12-08 RX ADMIN — CILOSTAZOL 50 MILLIGRAM(S): 100 TABLET ORAL at 05:19

## 2022-12-08 RX ADMIN — CILOSTAZOL 50 MILLIGRAM(S): 100 TABLET ORAL at 18:48

## 2022-12-08 RX ADMIN — HEPARIN SODIUM 5000 UNIT(S): 5000 INJECTION INTRAVENOUS; SUBCUTANEOUS at 05:19

## 2022-12-08 RX ADMIN — Medication 81 MILLIGRAM(S): at 15:23

## 2022-12-08 RX ADMIN — AMLODIPINE BESYLATE 10 MILLIGRAM(S): 2.5 TABLET ORAL at 05:19

## 2022-12-08 RX ADMIN — PANTOPRAZOLE SODIUM 40 MILLIGRAM(S): 20 TABLET, DELAYED RELEASE ORAL at 05:19

## 2022-12-08 RX ADMIN — NYSTATIN CREAM 1 APPLICATION(S): 100000 CREAM TOPICAL at 05:19

## 2022-12-08 RX ADMIN — ATORVASTATIN CALCIUM 80 MILLIGRAM(S): 80 TABLET, FILM COATED ORAL at 21:15

## 2022-12-08 RX ADMIN — HEPARIN SODIUM 5000 UNIT(S): 5000 INJECTION INTRAVENOUS; SUBCUTANEOUS at 18:49

## 2022-12-09 ENCOUNTER — TRANSCRIPTION ENCOUNTER (OUTPATIENT)
Age: 87
End: 2022-12-09

## 2022-12-09 LAB
ANION GAP SERPL CALC-SCNC: 7 MMOL/L — SIGNIFICANT CHANGE UP (ref 5–17)
BUN SERPL-MCNC: 11 MG/DL — SIGNIFICANT CHANGE UP (ref 7–23)
CALCIUM SERPL-MCNC: 8.8 MG/DL — SIGNIFICANT CHANGE UP (ref 8.5–10.1)
CHLORIDE SERPL-SCNC: 109 MMOL/L — HIGH (ref 96–108)
CO2 SERPL-SCNC: 25 MMOL/L — SIGNIFICANT CHANGE UP (ref 22–31)
CREAT SERPL-MCNC: 1.04 MG/DL — SIGNIFICANT CHANGE UP (ref 0.5–1.3)
EGFR: 51 ML/MIN/1.73M2 — LOW
FLUAV AG NPH QL: SIGNIFICANT CHANGE UP
FLUBV AG NPH QL: SIGNIFICANT CHANGE UP
GLUCOSE SERPL-MCNC: 77 MG/DL — SIGNIFICANT CHANGE UP (ref 70–99)
HCT VFR BLD CALC: 33.9 % — LOW (ref 34.5–45)
HGB BLD-MCNC: 10.9 G/DL — LOW (ref 11.5–15.5)
MCHC RBC-ENTMCNC: 26.8 PG — LOW (ref 27–34)
MCHC RBC-ENTMCNC: 32.2 G/DL — SIGNIFICANT CHANGE UP (ref 32–36)
MCV RBC AUTO: 83.5 FL — SIGNIFICANT CHANGE UP (ref 80–100)
NRBC # BLD: 0 /100 WBCS — SIGNIFICANT CHANGE UP (ref 0–0)
PLATELET # BLD AUTO: 365 K/UL — SIGNIFICANT CHANGE UP (ref 150–400)
POTASSIUM SERPL-MCNC: 3.6 MMOL/L — SIGNIFICANT CHANGE UP (ref 3.5–5.3)
POTASSIUM SERPL-SCNC: 3.6 MMOL/L — SIGNIFICANT CHANGE UP (ref 3.5–5.3)
RBC # BLD: 4.06 M/UL — SIGNIFICANT CHANGE UP (ref 3.8–5.2)
RBC # FLD: 14.8 % — HIGH (ref 10.3–14.5)
SARS-COV-2 RNA SPEC QL NAA+PROBE: SIGNIFICANT CHANGE UP
SODIUM SERPL-SCNC: 141 MMOL/L — SIGNIFICANT CHANGE UP (ref 135–145)
WBC # BLD: 4.3 K/UL — SIGNIFICANT CHANGE UP (ref 3.8–10.5)
WBC # FLD AUTO: 4.3 K/UL — SIGNIFICANT CHANGE UP (ref 3.8–10.5)

## 2022-12-09 RX ORDER — ASPIRIN/CALCIUM CARB/MAGNESIUM 324 MG
1 TABLET ORAL
Qty: 0 | Refills: 0 | DISCHARGE
Start: 2022-12-09

## 2022-12-09 RX ORDER — PANTOPRAZOLE SODIUM 20 MG/1
1 TABLET, DELAYED RELEASE ORAL
Qty: 0 | Refills: 0 | DISCHARGE
Start: 2022-12-09

## 2022-12-09 RX ORDER — OXYCODONE AND ACETAMINOPHEN 5; 325 MG/1; MG/1
1 TABLET ORAL
Qty: 0 | Refills: 0 | DISCHARGE
Start: 2022-12-09

## 2022-12-09 RX ORDER — FLUTICASONE PROPIONATE 50 MCG
0 SPRAY, SUSPENSION NASAL
Qty: 0 | Refills: 0 | DISCHARGE

## 2022-12-09 RX ORDER — AMLODIPINE BESYLATE 2.5 MG/1
1 TABLET ORAL
Qty: 0 | Refills: 0 | DISCHARGE
Start: 2022-12-09

## 2022-12-09 RX ADMIN — PANTOPRAZOLE SODIUM 40 MILLIGRAM(S): 20 TABLET, DELAYED RELEASE ORAL at 05:48

## 2022-12-09 RX ADMIN — NYSTATIN CREAM 1 APPLICATION(S): 100000 CREAM TOPICAL at 05:48

## 2022-12-09 RX ADMIN — HEPARIN SODIUM 5000 UNIT(S): 5000 INJECTION INTRAVENOUS; SUBCUTANEOUS at 17:36

## 2022-12-09 RX ADMIN — ATORVASTATIN CALCIUM 80 MILLIGRAM(S): 80 TABLET, FILM COATED ORAL at 21:40

## 2022-12-09 RX ADMIN — Medication 81 MILLIGRAM(S): at 11:54

## 2022-12-09 RX ADMIN — HEPARIN SODIUM 5000 UNIT(S): 5000 INJECTION INTRAVENOUS; SUBCUTANEOUS at 05:48

## 2022-12-09 RX ADMIN — NYSTATIN CREAM 1 APPLICATION(S): 100000 CREAM TOPICAL at 17:44

## 2022-12-09 RX ADMIN — CILOSTAZOL 50 MILLIGRAM(S): 100 TABLET ORAL at 05:48

## 2022-12-09 RX ADMIN — CILOSTAZOL 50 MILLIGRAM(S): 100 TABLET ORAL at 17:35

## 2022-12-09 RX ADMIN — AMLODIPINE BESYLATE 10 MILLIGRAM(S): 2.5 TABLET ORAL at 05:48

## 2022-12-09 NOTE — DISCHARGE NOTE PROVIDER - NSDCMRMEDTOKEN_GEN_ALL_CORE_FT
amLODIPine 10 mg oral tablet: 1 tab(s) orally once a day  aspirin 81 mg oral delayed release tablet: 1 tab(s) orally once a day  atorvastatin 80 mg oral tablet: 1 tab(s) orally once a day (at bedtime)  Calcium 600+D oral tablet: orally once a day  cilostazol 50 mg oral tablet: 1 tab(s) orally 2 times a day  colchicine 0.6 mg oral tablet: 1 tab(s) orally 2 times a day x 5 days  Lyrica 100 mg oral capsule: 1 cap(s) orally 2 times a day  nystatin 100,000 units/g topical cream: 1 application topically 2 times a day to affected area  oxycodone-acetaminophen 5 mg-325 mg oral tablet: 1 tab(s) orally every 8 hours, As needed, Moderate Pain (4 - 6)  pantoprazole 40 mg oral delayed release tablet: 1 tab(s) orally once a day (before a meal)  predniSONE 20 mg oral tablet: 2 tab(s) orally once a day x 7 days then taper by 10 mg each week   Tylenol: 650 milligram(s) orally every 8 hours

## 2022-12-09 NOTE — DISCHARGE NOTE PROVIDER - CARE PROVIDER_API CALL
Rosalba Mao  NEUROLOGY  [facility] [place] [address]  Phone: [phone]  Fax: [phone]  Follow Up Time:     [place] [address]  Phone: [phone]  Fax: [phone]  Follow Up Time:

## 2022-12-09 NOTE — DISCHARGE NOTE PROVIDER - NSDCCPCAREPLAN_GEN_ALL_CORE_FT
PRINCIPAL DISCHARGE DIAGNOSIS  Diagnosis: CVA (cerebral vascular accident)  Assessment and Plan of Treatment: left side weakness      SECONDARY DISCHARGE DIAGNOSES  Diagnosis: HTN (hypertension)  Assessment and Plan of Treatment:

## 2022-12-10 LAB
ANION GAP SERPL CALC-SCNC: 6 MMOL/L — SIGNIFICANT CHANGE UP (ref 5–17)
BUN SERPL-MCNC: 12 MG/DL — SIGNIFICANT CHANGE UP (ref 7–23)
CALCIUM SERPL-MCNC: 8.9 MG/DL — SIGNIFICANT CHANGE UP (ref 8.5–10.1)
CHLORIDE SERPL-SCNC: 108 MMOL/L — SIGNIFICANT CHANGE UP (ref 96–108)
CO2 SERPL-SCNC: 26 MMOL/L — SIGNIFICANT CHANGE UP (ref 22–31)
CREAT SERPL-MCNC: 1.08 MG/DL — SIGNIFICANT CHANGE UP (ref 0.5–1.3)
EGFR: 49 ML/MIN/1.73M2 — LOW
GLUCOSE SERPL-MCNC: 87 MG/DL — SIGNIFICANT CHANGE UP (ref 70–99)
HCT VFR BLD CALC: 36.3 % — SIGNIFICANT CHANGE UP (ref 34.5–45)
HGB BLD-MCNC: 11.6 G/DL — SIGNIFICANT CHANGE UP (ref 11.5–15.5)
MCHC RBC-ENTMCNC: 27.1 PG — SIGNIFICANT CHANGE UP (ref 27–34)
MCHC RBC-ENTMCNC: 32 G/DL — SIGNIFICANT CHANGE UP (ref 32–36)
MCV RBC AUTO: 84.8 FL — SIGNIFICANT CHANGE UP (ref 80–100)
NRBC # BLD: 0 /100 WBCS — SIGNIFICANT CHANGE UP (ref 0–0)
PLATELET # BLD AUTO: 366 K/UL — SIGNIFICANT CHANGE UP (ref 150–400)
POTASSIUM SERPL-MCNC: 3.6 MMOL/L — SIGNIFICANT CHANGE UP (ref 3.5–5.3)
POTASSIUM SERPL-SCNC: 3.6 MMOL/L — SIGNIFICANT CHANGE UP (ref 3.5–5.3)
RBC # BLD: 4.28 M/UL — SIGNIFICANT CHANGE UP (ref 3.8–5.2)
RBC # FLD: 15 % — HIGH (ref 10.3–14.5)
SODIUM SERPL-SCNC: 140 MMOL/L — SIGNIFICANT CHANGE UP (ref 135–145)
WBC # BLD: 4.57 K/UL — SIGNIFICANT CHANGE UP (ref 3.8–10.5)
WBC # FLD AUTO: 4.57 K/UL — SIGNIFICANT CHANGE UP (ref 3.8–10.5)

## 2022-12-10 RX ADMIN — Medication 81 MILLIGRAM(S): at 12:07

## 2022-12-10 RX ADMIN — PANTOPRAZOLE SODIUM 40 MILLIGRAM(S): 20 TABLET, DELAYED RELEASE ORAL at 05:53

## 2022-12-10 RX ADMIN — HEPARIN SODIUM 5000 UNIT(S): 5000 INJECTION INTRAVENOUS; SUBCUTANEOUS at 05:52

## 2022-12-10 RX ADMIN — CILOSTAZOL 50 MILLIGRAM(S): 100 TABLET ORAL at 17:09

## 2022-12-10 RX ADMIN — AMLODIPINE BESYLATE 10 MILLIGRAM(S): 2.5 TABLET ORAL at 05:53

## 2022-12-10 RX ADMIN — NYSTATIN CREAM 1 APPLICATION(S): 100000 CREAM TOPICAL at 05:52

## 2022-12-10 RX ADMIN — ATORVASTATIN CALCIUM 80 MILLIGRAM(S): 80 TABLET, FILM COATED ORAL at 21:46

## 2022-12-10 RX ADMIN — CILOSTAZOL 50 MILLIGRAM(S): 100 TABLET ORAL at 05:52

## 2022-12-10 RX ADMIN — NYSTATIN CREAM 1 APPLICATION(S): 100000 CREAM TOPICAL at 17:29

## 2022-12-10 RX ADMIN — HEPARIN SODIUM 5000 UNIT(S): 5000 INJECTION INTRAVENOUS; SUBCUTANEOUS at 17:09

## 2022-12-11 RX ADMIN — ATORVASTATIN CALCIUM 80 MILLIGRAM(S): 80 TABLET, FILM COATED ORAL at 21:01

## 2022-12-11 RX ADMIN — HEPARIN SODIUM 5000 UNIT(S): 5000 INJECTION INTRAVENOUS; SUBCUTANEOUS at 17:37

## 2022-12-11 RX ADMIN — NYSTATIN CREAM 1 APPLICATION(S): 100000 CREAM TOPICAL at 17:39

## 2022-12-11 RX ADMIN — Medication 81 MILLIGRAM(S): at 11:05

## 2022-12-11 RX ADMIN — PANTOPRAZOLE SODIUM 40 MILLIGRAM(S): 20 TABLET, DELAYED RELEASE ORAL at 05:31

## 2022-12-11 RX ADMIN — AMLODIPINE BESYLATE 10 MILLIGRAM(S): 2.5 TABLET ORAL at 05:31

## 2022-12-11 RX ADMIN — NYSTATIN CREAM 1 APPLICATION(S): 100000 CREAM TOPICAL at 05:30

## 2022-12-11 RX ADMIN — HEPARIN SODIUM 5000 UNIT(S): 5000 INJECTION INTRAVENOUS; SUBCUTANEOUS at 05:31

## 2022-12-11 RX ADMIN — CILOSTAZOL 50 MILLIGRAM(S): 100 TABLET ORAL at 17:36

## 2022-12-11 RX ADMIN — CILOSTAZOL 50 MILLIGRAM(S): 100 TABLET ORAL at 05:31

## 2022-12-12 LAB
FLUAV AG NPH QL: SIGNIFICANT CHANGE UP
FLUBV AG NPH QL: SIGNIFICANT CHANGE UP
SARS-COV-2 RNA SPEC QL NAA+PROBE: SIGNIFICANT CHANGE UP

## 2022-12-12 RX ADMIN — AMLODIPINE BESYLATE 10 MILLIGRAM(S): 2.5 TABLET ORAL at 05:22

## 2022-12-12 RX ADMIN — CILOSTAZOL 50 MILLIGRAM(S): 100 TABLET ORAL at 05:29

## 2022-12-12 RX ADMIN — HEPARIN SODIUM 5000 UNIT(S): 5000 INJECTION INTRAVENOUS; SUBCUTANEOUS at 17:16

## 2022-12-12 RX ADMIN — PANTOPRAZOLE SODIUM 40 MILLIGRAM(S): 20 TABLET, DELAYED RELEASE ORAL at 05:22

## 2022-12-12 RX ADMIN — Medication 81 MILLIGRAM(S): at 11:33

## 2022-12-12 RX ADMIN — NYSTATIN CREAM 1 APPLICATION(S): 100000 CREAM TOPICAL at 05:30

## 2022-12-12 RX ADMIN — ATORVASTATIN CALCIUM 80 MILLIGRAM(S): 80 TABLET, FILM COATED ORAL at 21:59

## 2022-12-12 RX ADMIN — CILOSTAZOL 50 MILLIGRAM(S): 100 TABLET ORAL at 17:12

## 2022-12-12 RX ADMIN — NYSTATIN CREAM 1 APPLICATION(S): 100000 CREAM TOPICAL at 17:15

## 2022-12-12 NOTE — DIETITIAN INITIAL EVALUATION ADULT - PERTINENT LABORATORY DATA
12-10 Na140 mmol/L Glu 87 mg/dL K+ 3.6 mmol/L Cr  1.08 mg/dL BUN 12 mg/dL 12-05 Chol 147 mg/dL LDL --    HDL 45 mg/dL<L> Trig 97 mg/dL  A1C with Estimated Average Glucose Result: 6.1 %<H-pre-diabetic range> (12-04-22 @ 09:50)

## 2022-12-12 NOTE — DIETITIAN INITIAL EVALUATION ADULT - PERTINENT MEDS FT
MEDICATIONS  (STANDING):  amLODIPine   Tablet 10 milliGRAM(s) Oral daily  aspirin enteric coated 81 milliGRAM(s) Oral daily  atorvastatin 80 milliGRAM(s) Oral at bedtime  cilostazol 50 milliGRAM(s) Oral two times a day  dextrose 5% + sodium chloride 0.9%. 1000 milliLiter(s) (30 mL/Hr) IV Continuous <Continuous>  dextrose 50% Injectable 25 Gram(s) IV Push once  glucagon  Injectable 1 milliGRAM(s) IntraMuscular once  heparin   Injectable 5000 Unit(s) SubCutaneous every 12 hours  nystatin Powder 1 Application(s) Topical two times a day  pantoprazole    Tablet 40 milliGRAM(s) Oral before breakfast    MEDICATIONS  (PRN):  acetaminophen     Tablet .. 650 milliGRAM(s) Oral every 6 hours PRN Temp greater or equal to 38C (100.4F), Mild Pain (1 - 3)  oxycodone    5 mG/acetaminophen 325 mG 1 Tablet(s) Oral every 8 hours PRN Moderate Pain (4 - 6)

## 2022-12-12 NOTE — DIETITIAN INITIAL EVALUATION ADULT - ORAL INTAKE PTA/DIET HISTORY
Pt reports good appetite PTA, lived alone, did own shopping, cooking.  Diet PTA: seasoned well with fresh herbs & seasoning, low sodium.  Pt did reports some difficulty c swallowing pills and liquids, but dislikes current consistency of soft, bite sized diet.  Pt c some missing teeth.   Pt c/o difficulty feeding herself at times, left sided weakness noted.

## 2022-12-12 NOTE — DIETITIAN INITIAL EVALUATION ADULT - OTHER CALCULATIONS
Height (cm): 168.9 (12-12)  Weight (kg): 79.4 (12-05)  BMI (kg/m2): 27.8 (12-12)  IBW:  60.3 kg  % IBW: 131%   UBW: 82.7kg    %UBW: 96%   Height (cm): 168.9 (12-12)  Weight (kg): 79.4 (12-05)  BMI (kg/m2): 27.8 (12-12)  IBW:  60.3 kg  % IBW: 131%   UBW: 82.7kg    %UBW: 96%. 12/12, 78.8kg/173.7 LBS, wt. loss of 0.6 kg/1.32 LBS(0.8%) noted in 1 week

## 2022-12-12 NOTE — DIETITIAN INITIAL EVALUATION ADULT - NS FNS WEIGHT CHANGE REASON
As per pt., she was 182 LBS(82.7 kg) in MD's office PTA and that she lost weight since this admission.  As per adm wt. 79.4 kg/175 LBS(12/05), wt. loss of 7 LBS from reported usual wt. noted.

## 2022-12-13 LAB
ANION GAP SERPL CALC-SCNC: 7 MMOL/L — SIGNIFICANT CHANGE UP (ref 5–17)
BUN SERPL-MCNC: 18 MG/DL — SIGNIFICANT CHANGE UP (ref 7–23)
CALCIUM SERPL-MCNC: 8.8 MG/DL — SIGNIFICANT CHANGE UP (ref 8.5–10.1)
CHLORIDE SERPL-SCNC: 109 MMOL/L — HIGH (ref 96–108)
CO2 SERPL-SCNC: 28 MMOL/L — SIGNIFICANT CHANGE UP (ref 22–31)
CREAT SERPL-MCNC: 1.08 MG/DL — SIGNIFICANT CHANGE UP (ref 0.5–1.3)
EGFR: 49 ML/MIN/1.73M2 — LOW
GLUCOSE SERPL-MCNC: 88 MG/DL — SIGNIFICANT CHANGE UP (ref 70–99)
HCT VFR BLD CALC: 35.8 % — SIGNIFICANT CHANGE UP (ref 34.5–45)
HGB BLD-MCNC: 11.7 G/DL — SIGNIFICANT CHANGE UP (ref 11.5–15.5)
MCHC RBC-ENTMCNC: 27.3 PG — SIGNIFICANT CHANGE UP (ref 27–34)
MCHC RBC-ENTMCNC: 32.7 G/DL — SIGNIFICANT CHANGE UP (ref 32–36)
MCV RBC AUTO: 83.4 FL — SIGNIFICANT CHANGE UP (ref 80–100)
NRBC # BLD: 0 /100 WBCS — SIGNIFICANT CHANGE UP (ref 0–0)
PLATELET # BLD AUTO: 422 K/UL — HIGH (ref 150–400)
POTASSIUM SERPL-MCNC: 4 MMOL/L — SIGNIFICANT CHANGE UP (ref 3.5–5.3)
POTASSIUM SERPL-SCNC: 4 MMOL/L — SIGNIFICANT CHANGE UP (ref 3.5–5.3)
RBC # BLD: 4.29 M/UL — SIGNIFICANT CHANGE UP (ref 3.8–5.2)
RBC # FLD: 14.9 % — HIGH (ref 10.3–14.5)
SODIUM SERPL-SCNC: 144 MMOL/L — SIGNIFICANT CHANGE UP (ref 135–145)
WBC # BLD: 4.69 K/UL — SIGNIFICANT CHANGE UP (ref 3.8–10.5)
WBC # FLD AUTO: 4.69 K/UL — SIGNIFICANT CHANGE UP (ref 3.8–10.5)

## 2022-12-13 RX ADMIN — CILOSTAZOL 50 MILLIGRAM(S): 100 TABLET ORAL at 18:34

## 2022-12-13 RX ADMIN — AMLODIPINE BESYLATE 10 MILLIGRAM(S): 2.5 TABLET ORAL at 06:30

## 2022-12-13 RX ADMIN — NYSTATIN CREAM 1 APPLICATION(S): 100000 CREAM TOPICAL at 18:33

## 2022-12-13 RX ADMIN — ATORVASTATIN CALCIUM 80 MILLIGRAM(S): 80 TABLET, FILM COATED ORAL at 21:12

## 2022-12-13 RX ADMIN — NYSTATIN CREAM 1 APPLICATION(S): 100000 CREAM TOPICAL at 06:30

## 2022-12-13 RX ADMIN — CILOSTAZOL 50 MILLIGRAM(S): 100 TABLET ORAL at 06:30

## 2022-12-13 RX ADMIN — PANTOPRAZOLE SODIUM 40 MILLIGRAM(S): 20 TABLET, DELAYED RELEASE ORAL at 06:30

## 2022-12-13 RX ADMIN — HEPARIN SODIUM 5000 UNIT(S): 5000 INJECTION INTRAVENOUS; SUBCUTANEOUS at 18:37

## 2022-12-13 RX ADMIN — HEPARIN SODIUM 5000 UNIT(S): 5000 INJECTION INTRAVENOUS; SUBCUTANEOUS at 06:30

## 2022-12-13 RX ADMIN — Medication 81 MILLIGRAM(S): at 15:16

## 2022-12-13 NOTE — SWALLOW BEDSIDE ASSESSMENT ADULT - SWALLOW EVAL: PATIENT/FAMILY GOALS STATEMENT
pt stated " I had another stroke, my speech is  not slurring like it was when I got here"; " I'm supposed to go to [facility] today"

## 2022-12-13 NOTE — SWALLOW BEDSIDE ASSESSMENT ADULT - ORAL PHASE
Prescription has been sent and PT has been left VM informing her of this refill being sent and verified with pharmacy. Within functional limits

## 2022-12-13 NOTE — SWALLOW BEDSIDE ASSESSMENT ADULT - SWALLOW EVAL: DIAGNOSIS
s/p acute right stroke and hx multiple CVA Oropharyngeal swallow mechanism is WFL ; s/p acute right stroke and hx multiple CVA; there are no airway protection deficits on exam

## 2022-12-13 NOTE — SWALLOW BEDSIDE ASSESSMENT ADULT - ORAL PREPARATORY PHASE
Within functional limits incomplete teeth however manipulated effectively/Decreased mastication ability

## 2022-12-13 NOTE — SWALLOW BEDSIDE ASSESSMENT ADULT - SLP GENERAL OBSERVATIONS
alert and oriented x 4; followed directions, remained verbally interactive with clear speech and aware of eating/swallowing context ; stated she had a problem swallowing a pill that was cut in half and made her vomit

## 2022-12-13 NOTE — SWALLOW BEDSIDE ASSESSMENT ADULT - COMMENTS
PMH CVA x3, HTN, carpal tunnel syndrome, gout  12/4 CXR No acute radiographic findings and no change  12/6 MR head Two punctate acute infarcts within the right frontal lobe, as described. No hemorrhagic transformation or midline shift;  Nonspecific findings, most commonly suggestive of prior chronic lacunar infarcts and chronic small vessel ischemia; Few prior chronic micro hemorrhages are noted  12/12 care mgr note pt is accepted at [facility]   12/13 MD note Acute right frontal stroke;  Old strokes;  discharge to [facility] pt denied difficulty with bolus and wanted harder solids; stated " I don't eat that food" pt denied difficulty with bolus and stated she wanted to advance the foods ; stated " I don't eat that food"

## 2022-12-14 ENCOUNTER — TRANSCRIPTION ENCOUNTER (OUTPATIENT)
Age: 87
End: 2022-12-14

## 2022-12-14 VITALS
RESPIRATION RATE: 17 BRPM | OXYGEN SATURATION: 95 % | HEART RATE: 93 BPM | SYSTOLIC BLOOD PRESSURE: 113 MMHG | TEMPERATURE: 98 F | DIASTOLIC BLOOD PRESSURE: 67 MMHG

## 2022-12-14 RX ADMIN — NYSTATIN CREAM 1 APPLICATION(S): 100000 CREAM TOPICAL at 18:12

## 2022-12-14 RX ADMIN — PANTOPRAZOLE SODIUM 40 MILLIGRAM(S): 20 TABLET, DELAYED RELEASE ORAL at 05:52

## 2022-12-14 RX ADMIN — NYSTATIN CREAM 1 APPLICATION(S): 100000 CREAM TOPICAL at 05:52

## 2022-12-14 RX ADMIN — CILOSTAZOL 50 MILLIGRAM(S): 100 TABLET ORAL at 18:13

## 2022-12-14 RX ADMIN — HEPARIN SODIUM 5000 UNIT(S): 5000 INJECTION INTRAVENOUS; SUBCUTANEOUS at 05:52

## 2022-12-14 RX ADMIN — HEPARIN SODIUM 5000 UNIT(S): 5000 INJECTION INTRAVENOUS; SUBCUTANEOUS at 18:12

## 2022-12-14 RX ADMIN — CILOSTAZOL 50 MILLIGRAM(S): 100 TABLET ORAL at 05:51

## 2022-12-14 RX ADMIN — Medication 81 MILLIGRAM(S): at 15:56

## 2022-12-14 RX ADMIN — AMLODIPINE BESYLATE 10 MILLIGRAM(S): 2.5 TABLET ORAL at 05:52

## 2022-12-14 NOTE — DISCHARGE NOTE NURSING/CASE MANAGEMENT/SOCIAL WORK - PATIENT PORTAL LINK FT
You can access the FollowMyHealth Patient Portal offered by [facility] by registering at the following website: [email or web address]. By joining [employer]’s FollowMyHealth portal, you will also be able to view your health information using other applications (apps) compatible with our system.

## 2022-12-14 NOTE — PROGRESS NOTE ADULT - PROVIDER SPECIALTY LIST ADULT
Internal Medicine
Internal Medicine
Neurology
Internal Medicine

## 2022-12-14 NOTE — PROGRESS NOTE ADULT - PROBLEM SELECTOR PLAN 2
Monitoring BP

## 2022-12-14 NOTE — PROGRESS NOTE ADULT - REASON FOR ADMISSION
Slurred speech

## 2022-12-14 NOTE — DISCHARGE NOTE NURSING/CASE MANAGEMENT/SOCIAL WORK - NSDCVIVACCINE_GEN_ALL_CORE_FT
Moderna COVID-19 Vaccine, Bivalent; 21-Sep-2022 17:07; Emma Gonzales (RN); [employer] [id number] (Exp. Date: 13-Oct-2022); IntraMuscular; Deltoid Left.; 0.5 milliLiter(s);   influenza, injectable, quadrivalent, preservative free; 12-Oct-2017 15:24; Keyanna Dallas (RN); [employer]; [id number]; IntraMuscular; Deltoid Left.; 0.5 milliLiter(s); VIS (VIS Published: 07-Aug-2015, VIS Presented: 12-Oct-2017);   Tdap; 25-Dec-2019 20:38; Candice Orta (RN); [employer]; [id number] (Exp. Date: 17-Sep-2021); IntraMuscular; Deltoid Left.; 0.5 milliLiter(s); VIS (VIS Published: 09-May-2013, VIS Presented: 25-Dec-2019);

## 2022-12-14 NOTE — PROGRESS NOTE ADULT - ASSESSMENT
Admitted with probable CVA

## 2022-12-14 NOTE — PROGRESS NOTE ADULT - NUTRITIONAL ASSESSMENT
This patient has been assessed with a concern for Malnutrition and has been determined to have a diagnosis/diagnoses of Moderate protein-calorie malnutrition.    This patient is being managed with:   Diet Easy to Chew-  DASH/TLC {Sodium & Cholesterol Restricted}  Entered: Dec 13 2022  3:47PM    
This patient has been assessed with a concern for Malnutrition and has been determined to have a diagnosis/diagnoses of Moderate protein-calorie malnutrition.    This patient is being managed with:   Diet Soft and Bite Sized-  Entered: Dec  4 2022 12:46PM    
This patient has been assessed with a concern for Malnutrition and has been determined to have a diagnosis/diagnoses of Moderate protein-calorie malnutrition.    This patient is being managed with:   Diet Soft and Bite Sized-  Entered: Dec  4 2022 12:46PM

## 2022-12-14 NOTE — DISCHARGE NOTE NURSING/CASE MANAGEMENT/SOCIAL WORK - NSDCPEFALRISK_GEN_ALL_CORE
For information on Fall & Injury Prevention, visit: [email or web address] OR  [email or web address] OR  https://www.cdc.gov/steadi/patient.html

## 2022-12-14 NOTE — DISCHARGE NOTE NURSING/CASE MANAGEMENT/SOCIAL WORK - NSFLUVACAGEDISCH_IMM_ALL_CORE
[facility] Care Coordination Contact  Arranged transportation thru [facility] for the below appt:  Appt Date & Time: 8/20 @ 3:30 pm  Clinic Name & Address:  [facility]  Transportation Provider: Catherine   time:  2:30-3:00 pm    Notified Lacy of  time.    Nasreen Dexter  Case Management Specialist  [facility]   [phone]     Adult

## 2022-12-14 NOTE — PROGRESS NOTE ADULT - SUBJECTIVE AND OBJECTIVE BOX
Neurology Progress Note    No acute events.     Neuro Exam: AOx3. Follows commands. Mild spastic dysarthria. No facial droop. PERRL. Moving all four extremities with left sided more than right sided weakness.     MRI brain- pending  Echo- pending  CTA head/neck- no significant stenosis  LDL- 83  HgbA1c- 6.1     A/P:  Dysarthria  Old strokes  Fibromyalgia  HTN  Gout    - aspirin and statin for secondary stroke prevention  - MRI brain and Echo pending  - PT/OT
Neurology Progress Note    No acute events.     Neuro Exam: AOx3. Follows commands. Mild spastic dysarthria. No facial droop. PERRL. Moving all four extremities with left sided more than right sided weakness.     MRI brain- pending  Echo- pending  CTA head/neck- no significant stenosis  LDL- 83  HgbA1c- 6.1     A/P:  Dysarthria  Old strokes  Fibromyalgia  HTN  Gout    - aspirin and statin for secondary stroke prevention  - MRI brain and Echo pending  - PT/OT
Patient is a 89y old  Female who presents with a chief complaint of Slurred speech (07 Dec 2022 13:26)      SUBJECTIVE/OBJECTIVE:    Comfortable    MEDICATIONS  (STANDING):  amLODIPine   Tablet 10 milliGRAM(s) Oral daily  aspirin enteric coated 81 milliGRAM(s) Oral daily  atorvastatin 80 milliGRAM(s) Oral at bedtime  cilostazol 50 milliGRAM(s) Oral two times a day  dextrose 5% + sodium chloride 0.9%. 1000 milliLiter(s) (30 mL/Hr) IV Continuous <Continuous>  dextrose 50% Injectable 25 Gram(s) IV Push once  glucagon  Injectable 1 milliGRAM(s) IntraMuscular once  heparin   Injectable 5000 Unit(s) SubCutaneous every 12 hours  nystatin Powder 1 Application(s) Topical two times a day  pantoprazole    Tablet 40 milliGRAM(s) Oral before breakfast    MEDICATIONS  (PRN):  acetaminophen     Tablet .. 650 milliGRAM(s) Oral every 6 hours PRN Temp greater or equal to 38C (100.4F), Mild Pain (1 - 3)  oxycodone    5 mG/acetaminophen 325 mG 1 Tablet(s) Oral every 8 hours PRN Moderate Pain (4 - 6)      Allergies    No Known Allergies    Intolerances          Vital Signs Last 24 Hrs  T(C): 36.8 (08 Dec 2022 10:37), Max: 36.8 (07 Dec 2022 17:37)  T(F): 98.2 (08 Dec 2022 10:37), Max: 98.2 (07 Dec 2022 17:37)  HR: 86 (08 Dec 2022 10:37) (69 - 92)  BP: 120/75 (08 Dec 2022 10:37) (120/75 - 131/75)  BP(mean): --  RR: 18 (08 Dec 2022 10:37) (18 - 18)  SpO2: 97% (08 Dec 2022 10:37) (92% - 97%)    Parameters below as of 08 Dec 2022 10:37  Patient On (Oxygen Delivery Method): room air                  Physical Exam:   Physical Exam: PHYSICAL EXAM:  GENERAL: NAD, well-groomed, well-developed  HEAD:  Atraumatic, Normocephalic  EYES:  PERRLA, conjunctiva and sclera clear  ENMT: Intact  NECK: Supple, No JVD, Normal thyroid  NERVOUS SYSTEM:  Alert ;  Motor Strength 3/5 R; L hemiparesis;  Mild Dysarthria   CHEST/LUNG: Clear bilaterally; No rales, rhonchi, wheezing, or rubs  HEART: Regular rate and rhythm; No murmurs, rubs, or gallops  ABDOMEN: Soft, Nontender, Nondistended; Bowel sounds present  EXTREMITIES:   No clubbing, cyanosis, or edema  SKIN: No rashes or lesions      LABS:                        11.6   4.74  )-----------( 346      ( 08 Dec 2022 08:30 )             36.1     12-08    140  |  111<H>  |  11  ----------------------------<  73  3.7   |  24  |  1.01    Ca    8.8      08 Dec 2022 08:30          CAPILLARY BLOOD GLUCOSE      POCT Blood Glucose.: 106 mg/dL (08 Dec 2022 11:16)  POCT Blood Glucose.: 77 mg/dL (08 Dec 2022 07:44)  POCT Blood Glucose.: 93 mg/dL (07 Dec 2022 20:48)  POCT Blood Glucose.: 81 mg/dL (07 Dec 2022 16:44)          RADIOLOGY & ADDITIONAL TESTS:        Consultant(s) Notes Reviewed:  [xxxx ] YES  [ ] NO  
Patient is a 89y old  Female who presents with a chief complaint of Slurred speech (13 Dec 2022 12:14)      SUBJECTIVE/OBJECTIVE:    Without complaints. Patient resting comfortably.     MEDICATIONS  (STANDING):  amLODIPine   Tablet 10 milliGRAM(s) Oral daily  aspirin enteric coated 81 milliGRAM(s) Oral daily  atorvastatin 80 milliGRAM(s) Oral at bedtime  cilostazol 50 milliGRAM(s) Oral two times a day  dextrose 5% + sodium chloride 0.9%. 1000 milliLiter(s) (30 mL/Hr) IV Continuous <Continuous>  dextrose 50% Injectable 25 Gram(s) IV Push once  glucagon  Injectable 1 milliGRAM(s) IntraMuscular once  heparin   Injectable 5000 Unit(s) SubCutaneous every 12 hours  nystatin Powder 1 Application(s) Topical two times a day  pantoprazole    Tablet 40 milliGRAM(s) Oral before breakfast    MEDICATIONS  (PRN):  acetaminophen     Tablet .. 650 milliGRAM(s) Oral every 6 hours PRN Temp greater or equal to 38C (100.4F), Mild Pain (1 - 3)      Allergies    No Known Allergies    Intolerances          Vital Signs Last 24 Hrs  T(C): 36.5 (14 Dec 2022 11:31), Max: 37 (13 Dec 2022 18:16)  T(F): 97.7 (14 Dec 2022 11:31), Max: 98.6 (13 Dec 2022 18:16)  HR: 93 (14 Dec 2022 11:31) (78 - 93)  BP: 113/67 (14 Dec 2022 11:31) (108/66 - 120/73)  BP(mean): 88 (13 Dec 2022 18:16) (88 - 88)  RR: 17 (14 Dec 2022 11:31) (17 - 18)  SpO2: 95% (14 Dec 2022 11:31) (95% - 99%)    Parameters below as of 14 Dec 2022 11:31  Patient On (Oxygen Delivery Method): nasal cannula                PHYSICAL EXAM:  GENERAL: NAD, well-groomed, well-developed  HEAD:  Atraumatic, Normocephalic  EYES: EOMI, PERRLA, conjunctiva and sclera clear  ENMT: No tonsillar erythema, exudates, or enlargement; Moist mucous membranes, No lesions  NECK: Supple, No JVD, Normal thyroid  NERVOUS SYSTEM:  Alert & Oriented X3; Motor Strength L hemiparsis  CHEST/LUNG: Clear bilaterally; No rales, rhonchi, wheezing, or rubs  HEART: Regular rate and rhythm; No murmurs, rubs, or gallops  ABDOMEN: Soft, Nontender, Nondistended; Bowel sounds present  EXTREMITIES:  , No clubbing, cyanosis, or edema  SKIN: No rashes or lesions    LABS:                        11.7   4.69  )-----------( 422      ( 13 Dec 2022 07:00 )             35.8     12-13    144  |  109<H>  |  18  ----------------------------<  88  4.0   |  28  |  1.08    Ca    8.8      13 Dec 2022 07:00          CAPILLARY BLOOD GLUCOSE              RADIOLOGY & ADDITIONAL TESTS:        Consultant(s) Notes Reviewed:  [ ] YES  [ ] NO  
Neurology Progress Note    No acute events.     Neuro Exam: AOx3. Follows commands. Mild spastic dysarthria. No facial droop. PERRL. Moving all four extremities with left sided more than right sided weakness.     MRI brain- Two punctate acute infarcts within the right frontal lobe  Echo- Normal EF  CTA head/neck- no significant stenosis  LDL- 83  HgbA1c- 6.1     A/P:  Acute right frontal stroke  Old strokes  Fibromyalgia  HTN  Gout    - aspirin and statin for secondary stroke prevention  - PT/OT  - discharge planning to rehab
Neurology Progress Note    No acute events.     Neuro Exam: AOx3. Follows commands. Mild spastic dysarthria. No facial droop. PERRL. Moving all four extremities with left sided more than right sided weakness.     MRI brain- Two punctate acute infarcts within the right frontal lobe  Echo- Normal EF  CTA head/neck- no significant stenosis  LDL- 83  HgbA1c- 6.1     A/P:  Acute right frontal stroke  Old strokes  Fibromyalgia  HTN  Gout    - aspirin and statin for secondary stroke prevention  - PT/OT  - discharge planning to rehab
Patient is a 89y old  Female who presents with a chief complaint of Acute CVA (09 Dec 2022 10:43)      SUBJECTIVE/OBJECTIVE:    Without complaints. Patient resting comfortably.     MEDICATIONS  (STANDING):  amLODIPine   Tablet 10 milliGRAM(s) Oral daily  aspirin enteric coated 81 milliGRAM(s) Oral daily  atorvastatin 80 milliGRAM(s) Oral at bedtime  cilostazol 50 milliGRAM(s) Oral two times a day  dextrose 5% + sodium chloride 0.9%. 1000 milliLiter(s) (30 mL/Hr) IV Continuous <Continuous>  dextrose 50% Injectable 25 Gram(s) IV Push once  glucagon  Injectable 1 milliGRAM(s) IntraMuscular once  heparin   Injectable 5000 Unit(s) SubCutaneous every 12 hours  nystatin Powder 1 Application(s) Topical two times a day  pantoprazole    Tablet 40 milliGRAM(s) Oral before breakfast    MEDICATIONS  (PRN):  acetaminophen     Tablet .. 650 milliGRAM(s) Oral every 6 hours PRN Temp greater or equal to 38C (100.4F), Mild Pain (1 - 3)  oxycodone    5 mG/acetaminophen 325 mG 1 Tablet(s) Oral every 8 hours PRN Moderate Pain (4 - 6)      Allergies    No Known Allergies    Intolerances          Vital Signs Last 24 Hrs  T(C): 36.7 (09 Dec 2022 10:23), Max: 36.8 (08 Dec 2022 16:09)  T(F): 98 (09 Dec 2022 10:23), Max: 98.2 (08 Dec 2022 16:09)  HR: 90 (09 Dec 2022 10:23) (79 - 90)  BP: 113/73 (09 Dec 2022 10:23) (109/69 - 126/74)  BP(mean): --  RR: 18 (09 Dec 2022 10:23) (16 - 18)  SpO2: 98% (09 Dec 2022 10:23) (92% - 98%)    Parameters below as of 09 Dec 2022 10:23  Patient On (Oxygen Delivery Method): room air          Physical Exam:   Physical Exam: PHYSICAL EXAM:  GENERAL: NAD, well-groomed, well-developed  HEAD:  Atraumatic, Normocephalic  EYES:  PERRLA, conjunctiva and sclera clear  ENMT: Intact  NECK: Supple, No JVD, Normal thyroid  NERVOUS SYSTEM:  Alert ;  Motor Strength 3/5 R; L hemiparesis;  Mild Dysarthria   CHEST/LUNG: Clear bilaterally; No rales, rhonchi, wheezing, or rubs  HEART: Regular rate and rhythm; No murmurs, rubs, or gallops  ABDOMEN: Soft, Nontender, Nondistended; Bowel sounds present  EXTREMITIES:   No clubbing, cyanosis, or edema  SKIN: No rashes or lesions              LABS:                        10.9   4.30  )-----------( 365      ( 09 Dec 2022 06:45 )             33.9     12-09    141  |  109<H>  |  11  ----------------------------<  77  3.6   |  25  |  1.04    Ca    8.8      09 Dec 2022 06:45          CAPILLARY BLOOD GLUCOSE              RADIOLOGY & ADDITIONAL TESTS:        Consultant(s) Notes Reviewed:  [xx ] YES  [ ] NO  
Neurology Progress Note    No acute events.     Neuro Exam: AOx3. Follows commands. Mild spastic dysarthria. No facial droop. PERRL. Moving all four extremities with left sided more than right sided weakness.     MRI brain- Two punctate acute infarcts within the right frontal lobe  Echo- Normal EF  CTA head/neck- no significant stenosis  LDL- 83  HgbA1c- 6.1     A/P:  Acute right frontal stroke  Old strokes  Fibromyalgia  HTN  Gout    - aspirin and statin for secondary stroke prevention  - PT/OT  - discharge planning to rehab
Patient is a 89y old  Female who presents with a chief complaint of Slurred speech (09 Dec 2022 12:57)      SUBJECTIVE/OBJECTIVE:    Without complaints. Patient resting comfortably.     MEDICATIONS  (STANDING):  amLODIPine   Tablet 10 milliGRAM(s) Oral daily  aspirin enteric coated 81 milliGRAM(s) Oral daily  atorvastatin 80 milliGRAM(s) Oral at bedtime  cilostazol 50 milliGRAM(s) Oral two times a day  dextrose 5% + sodium chloride 0.9%. 1000 milliLiter(s) (30 mL/Hr) IV Continuous <Continuous>  dextrose 50% Injectable 25 Gram(s) IV Push once  glucagon  Injectable 1 milliGRAM(s) IntraMuscular once  heparin   Injectable 5000 Unit(s) SubCutaneous every 12 hours  nystatin Powder 1 Application(s) Topical two times a day  pantoprazole    Tablet 40 milliGRAM(s) Oral before breakfast    MEDICATIONS  (PRN):  acetaminophen     Tablet .. 650 milliGRAM(s) Oral every 6 hours PRN Temp greater or equal to 38C (100.4F), Mild Pain (1 - 3)  oxycodone    5 mG/acetaminophen 325 mG 1 Tablet(s) Oral every 8 hours PRN Moderate Pain (4 - 6)      Allergies    No Known Allergies    Intolerances          Vital Signs Last 24 Hrs  T(C): 36.4 (10 Dec 2022 10:34), Max: 36.8 (09 Dec 2022 16:10)  T(F): 97.6 (10 Dec 2022 10:34), Max: 98.2 (09 Dec 2022 16:10)  HR: 68 (10 Dec 2022 10:34) (68 - 91)  BP: 120/78 (10 Dec 2022 10:34) (106/69 - 120/78)  BP(mean): --  RR: 18 (10 Dec 2022 10:34) (18 - 18)  SpO2: 93% (10 Dec 2022 10:34) (93% - 95%)    Parameters below as of 10 Dec 2022 10:34  Patient On (Oxygen Delivery Method): room air      Physical Exam:   Physical Exam: PHYSICAL EXAM:  GENERAL: NAD, well-groomed, well-developed  HEAD:  Atraumatic, Normocephalic  EYES:  PERRLA, conjunctiva and sclera clear  ENMT: Intact  NECK: Supple, No JVD, Normal thyroid  NERVOUS SYSTEM:  Alert ;  Motor Strength 3/5 R; L hemiparesis;  Mild Dysarthria   CHEST/LUNG: Clear bilaterally; No rales, rhonchi, wheezing, or rubs  HEART: Regular rate and rhythm; No murmurs, rubs, or gallops  ABDOMEN: Soft, Nontender, Nondistended; Bowel sounds present  EXTREMITIES:   No clubbing, cyanosis, or edema  SKIN: No rashes or lesions      LABS:                        11.6   4.57  )-----------( 366      ( 10 Dec 2022 07:35 )             36.3     12-10    140  |  108  |  12  ----------------------------<  87  3.6   |  26  |  1.08    Ca    8.9      10 Dec 2022 07:35          CAPILLARY BLOOD GLUCOSE              RADIOLOGY & ADDITIONAL TESTS:        Consultant(s) Notes Reviewed:  [ ] YES  [ ] NO  
Patient is a 89y old  Female who presents with a chief complaint of Slurred speech (12 Dec 2022 14:29)      SUBJECTIVE/OBJECTIVE:    Without complaints. Patient resting comfortably.     MEDICATIONS  (STANDING):  amLODIPine   Tablet 10 milliGRAM(s) Oral daily  aspirin enteric coated 81 milliGRAM(s) Oral daily  atorvastatin 80 milliGRAM(s) Oral at bedtime  cilostazol 50 milliGRAM(s) Oral two times a day  dextrose 5% + sodium chloride 0.9%. 1000 milliLiter(s) (30 mL/Hr) IV Continuous <Continuous>  dextrose 50% Injectable 25 Gram(s) IV Push once  glucagon  Injectable 1 milliGRAM(s) IntraMuscular once  heparin   Injectable 5000 Unit(s) SubCutaneous every 12 hours  nystatin Powder 1 Application(s) Topical two times a day  pantoprazole    Tablet 40 milliGRAM(s) Oral before breakfast    MEDICATIONS  (PRN):  acetaminophen     Tablet .. 650 milliGRAM(s) Oral every 6 hours PRN Temp greater or equal to 38C (100.4F), Mild Pain (1 - 3)  oxycodone    5 mG/acetaminophen 325 mG 1 Tablet(s) Oral every 8 hours PRN Moderate Pain (4 - 6)      Allergies    No Known Allergies    Intolerances          Vital Signs Last 24 Hrs  T(C): 36.3 (13 Dec 2022 11:39), Max: 37.1 (12 Dec 2022 16:15)  T(F): 97.3 (13 Dec 2022 11:39), Max: 98.8 (12 Dec 2022 16:15)  HR: 94 (13 Dec 2022 11:39) (78 - 94)  BP: 117/70 (13 Dec 2022 11:39) (104/69 - 117/70)  BP(mean): --  RR: 19 (13 Dec 2022 11:39) (18 - 19)  SpO2: 94% (13 Dec 2022 11:39) (93% - 95%)    Parameters below as of 13 Dec 2022 11:39  Patient On (Oxygen Delivery Method): room air                  PHYSICAL EXAM:  GENERAL: NAD, well-groomed, well-developed  HEAD:  Atraumatic, Normocephalic  EYES: EOMI, PERRLA, conjunctiva and sclera clear  ENMT: No tonsillar erythema, exudates, or enlargement; Moist mucous membranes, No lesions  NECK: Supple, No JVD, Normal thyroid  NERVOUS SYSTEM:  Alert & Oriented X3; Motor Strength L hemiparsis  CHEST/LUNG: Clear bilaterally; No rales, rhonchi, wheezing, or rubs  HEART: Regular rate and rhythm; No murmurs, rubs, or gallops  ABDOMEN: Soft, Nontender, Nondistended; Bowel sounds present  EXTREMITIES:  , No clubbing, cyanosis, or edema  SKIN: No rashes or lesions  LABS:                        11.7   4.69  )-----------( 422      ( 13 Dec 2022 07:00 )             35.8     12-13    144  |  109<H>  |  18  ----------------------------<  88  4.0   |  28  |  1.08    Ca    8.8      13 Dec 2022 07:00          CAPILLARY BLOOD GLUCOSE              RADIOLOGY & ADDITIONAL TESTS:        Consultant(s) Notes Reviewed:  [ ] YES  [ ] NO  
Patient is a 89y old  Female who presents with a chief complaint of Slurred speech (07 Dec 2022 09:25)      SUBJECTIVE/OBJECTIVE:    Without complaints. Patient resting comfortably.     MEDICATIONS  (STANDING):  amLODIPine   Tablet 10 milliGRAM(s) Oral daily  aspirin enteric coated 81 milliGRAM(s) Oral daily  atorvastatin 80 milliGRAM(s) Oral at bedtime  cilostazol 50 milliGRAM(s) Oral two times a day  dextrose 5% + sodium chloride 0.9%. 1000 milliLiter(s) (30 mL/Hr) IV Continuous <Continuous>  dextrose 50% Injectable 25 Gram(s) IV Push once  dextrose 50% Injectable 12.5 Gram(s) IV Push once  dextrose 50% Injectable 25 Gram(s) IV Push once  dextrose Oral Gel 15 Gram(s) Oral once  glucagon  Injectable 1 milliGRAM(s) IntraMuscular once  heparin   Injectable 5000 Unit(s) SubCutaneous every 12 hours  nystatin Powder 1 Application(s) Topical two times a day  pantoprazole    Tablet 40 milliGRAM(s) Oral before breakfast    MEDICATIONS  (PRN):  acetaminophen     Tablet .. 650 milliGRAM(s) Oral every 6 hours PRN Temp greater or equal to 38C (100.4F), Mild Pain (1 - 3)  oxycodone    5 mG/acetaminophen 325 mG 1 Tablet(s) Oral every 8 hours PRN Moderate Pain (4 - 6)      Allergies    No Known Allergies    Intolerances          Vital Signs Last 24 Hrs  T(C): 36.4 (07 Dec 2022 11:09), Max: 36.8 (06 Dec 2022 16:00)  T(F): 97.6 (07 Dec 2022 11:09), Max: 98.2 (06 Dec 2022 16:00)  HR: 80 (07 Dec 2022 11:09) (57 - 92)  BP: 125/75 (07 Dec 2022 11:09) (113/65 - 126/85)  BP(mean): --  RR: 18 (07 Dec 2022 11:09) (18 - 20)  SpO2: 97% (07 Dec 2022 11:09) (93% - 100%)    Parameters below as of 07 Dec 2022 11:09  Patient On (Oxygen Delivery Method): room air          Physical Exam:   Physical Exam: PHYSICAL EXAM:  GENERAL: NAD, well-groomed, well-developed  HEAD:  Atraumatic, Normocephalic  EYES:  PERRLA, conjunctiva and sclera clear  ENMT: Intact  NECK: Supple, No JVD, Normal thyroid  NERVOUS SYSTEM:  Alert ;  Motor Strength 3/5 R; L hemiparesis; Dysarthria   CHEST/LUNG: Clear bilaterally; No rales, rhonchi, wheezing, or rubs  HEART: Regular rate and rhythm; No murmurs, rubs, or gallops  ABDOMEN: Soft, Nontender, Nondistended; Bowel sounds present  EXTREMITIES:   No clubbing, cyanosis, or edema  SKIN: No rashes or lesions      LABS:                        11.0   4.10  )-----------( 328      ( 07 Dec 2022 07:18 )             34.6     12-07    141  |  110<H>  |  13  ----------------------------<  78  3.6   |  25  |  1.01    Ca    8.8      07 Dec 2022 07:18          CAPILLARY BLOOD GLUCOSE      POCT Blood Glucose.: 91 mg/dL (07 Dec 2022 11:16)  POCT Blood Glucose.: 84 mg/dL (07 Dec 2022 07:48)  POCT Blood Glucose.: 112 mg/dL (06 Dec 2022 22:26)  POCT Blood Glucose.: 106 mg/dL (06 Dec 2022 17:25)          RADIOLOGY & ADDITIONAL TESTS:        Consultant(s) Notes Reviewed:  [xxx< from: TTE Echo Complete w/o Contrast w/ Doppler (12.06.22 @ 12:57) >  Summary:   1. Left ventricular ejection fraction, by visual estimation, is 60 to   65%.   2. Technically difficult study.   3. Normal global left ventricular systolic function.   4. Mildly increased LV wall thickness.   5. Normal left ventricular internal cavity size.   6. Spectral Doppler shows impaired relaxation pattern of left   ventricular myocardial filling (Grade I diastolic dysfunction).   7. Normal right ventricular size and function.   8. Normal left atrial size.   9. Normal right atrial size.  10. There is no evidence of pericardial effusion.  11. Mild mitral valve regurgitation.  12. Mild thickening and calcification of the anterior and posterior   mitral valve leaflets.  13. Sclerotic aortic valve with decreased opening.  14. Consider echocontrast enhanced study to further characterized   endocardial border definition.  15. Increased relative wall thickness with normal mass index consistent   with left ventricular concentric remodeling.  16. Intact intra-atrial septum without shunt.        < end of copied text >  xxx ] YES  [ ] NO  
Patient is a 89y old  Female who presents with a chief complaint of SLURRED SPEECH     (12 Dec 2022 12:06)      SUBJECTIVE/OBJECTIVE:  Without complaints. Patient resting comfortably.     MEDICATIONS  (STANDING):  amLODIPine   Tablet 10 milliGRAM(s) Oral daily  aspirin enteric coated 81 milliGRAM(s) Oral daily  atorvastatin 80 milliGRAM(s) Oral at bedtime  cilostazol 50 milliGRAM(s) Oral two times a day  dextrose 5% + sodium chloride 0.9%. 1000 milliLiter(s) (30 mL/Hr) IV Continuous <Continuous>  dextrose 50% Injectable 25 Gram(s) IV Push once  glucagon  Injectable 1 milliGRAM(s) IntraMuscular once  heparin   Injectable 5000 Unit(s) SubCutaneous every 12 hours  nystatin Powder 1 Application(s) Topical two times a day  pantoprazole    Tablet 40 milliGRAM(s) Oral before breakfast    MEDICATIONS  (PRN):  acetaminophen     Tablet .. 650 milliGRAM(s) Oral every 6 hours PRN Temp greater or equal to 38C (100.4F), Mild Pain (1 - 3)  oxycodone    5 mG/acetaminophen 325 mG 1 Tablet(s) Oral every 8 hours PRN Moderate Pain (4 - 6)      Allergies    No Known Allergies    Intolerances          Vital Signs Last 24 Hrs  T(C): 37.1 (12 Dec 2022 10:42), Max: 37.1 (12 Dec 2022 10:42)  T(F): 98.7 (12 Dec 2022 10:42), Max: 98.7 (12 Dec 2022 10:42)  HR: 100 (12 Dec 2022 10:42) (77 - 100)  BP: 105/67 (12 Dec 2022 10:42) (104/66 - 109/72)  BP(mean): --  RR: 18 (12 Dec 2022 10:42) (18 - 18)  SpO2: 95% (12 Dec 2022 10:42) (92% - 97%)    Parameters below as of 11 Dec 2022 16:15  Patient On (Oxygen Delivery Method): room air        PHYSICAL EXAM:  GENERAL: NAD, well-groomed, well-developed  HEAD:  Atraumatic, Normocephalic  EYES: EOMI, PERRLA, conjunctiva and sclera clear  ENMT: No tonsillar erythema, exudates, or enlargement; Moist mucous membranes, No lesions  NECK: Supple, No JVD, Normal thyroid  NERVOUS SYSTEM:  Alert & Oriented X3; Motor Strength L hemiparsis  CHEST/LUNG: Clear bilaterally; No rales, rhonchi, wheezing, or rubs  HEART: Regular rate and rhythm; No murmurs, rubs, or gallops  ABDOMEN: Soft, Nontender, Nondistended; Bowel sounds present  EXTREMITIES:  , No clubbing, cyanosis, or edema  SKIN: No rashes or lesions    LABS:              CAPILLARY BLOOD GLUCOSE              RADIOLOGY & ADDITIONAL TESTS:        Consultant(s) Notes Reviewed:  [ ] YES  [ ] NO  
Patient is a 89y old  Female who presents with a chief complaint of Slurred speech (06 Dec 2022 10:39)      SUBJECTIVE/OBJECTIVE:    Comfortable    MEDICATIONS  (STANDING):  amLODIPine   Tablet 10 milliGRAM(s) Oral daily  aspirin enteric coated 81 milliGRAM(s) Oral daily  atorvastatin 80 milliGRAM(s) Oral at bedtime  cilostazol 50 milliGRAM(s) Oral two times a day  dextrose 5% + sodium chloride 0.9%. 1000 milliLiter(s) (30 mL/Hr) IV Continuous <Continuous>  dextrose 50% Injectable 25 Gram(s) IV Push once  dextrose 50% Injectable 12.5 Gram(s) IV Push once  dextrose 50% Injectable 25 Gram(s) IV Push once  dextrose Oral Gel 15 Gram(s) Oral once  glucagon  Injectable 1 milliGRAM(s) IntraMuscular once  heparin   Injectable 5000 Unit(s) SubCutaneous every 12 hours  pantoprazole    Tablet 40 milliGRAM(s) Oral before breakfast    MEDICATIONS  (PRN):      Allergies    No Known Allergies    Intolerances          Vital Signs Last 24 Hrs  T(C): 36.5 (06 Dec 2022 09:30), Max: 37.1 (05 Dec 2022 15:58)  T(F): 97.7 (06 Dec 2022 09:30), Max: 98.7 (05 Dec 2022 15:58)  HR: 75 (06 Dec 2022 09:30) (62 - 87)  BP: 122/75 (06 Dec 2022 09:30) (110/72 - 126/79)  BP(mean): --  RR: 20 (06 Dec 2022 09:30) (17 - 20)  SpO2: 95% (06 Dec 2022 09:30) (94% - 97%)    Parameters below as of 06 Dec 2022 09:30  Patient On (Oxygen Delivery Method): room air                      Physical Exam:   Physical Exam: PHYSICAL EXAM:  GENERAL: NAD, well-groomed, well-developed  HEAD:  Atraumatic, Normocephalic  EYES:  PERRLA, conjunctiva and sclera clear  ENMT: Intact  NECK: Supple, No JVD, Normal thyroid  NERVOUS SYSTEM:  Alert ;  Motor Strength 3/5 R; L hemiparesis; Dysarthria   CHEST/LUNG: Clear bilaterally; No rales, rhonchi, wheezing, or rubs  HEART: Regular rate and rhythm; No murmurs, rubs, or gallops  ABDOMEN: Soft, Nontender, Nondistended; Bowel sounds present  EXTREMITIES:   No clubbing, cyanosis, or edema  SKIN: No rashes or lesions    LABS:                        12.5   4.78  )-----------( 303      ( 05 Dec 2022 05:30 )             38.8   < from: MR Head No Cont (12.06.22 @ 12:16) >    FINDINGS:    Examination is slightly degraded due to patient motion.    Two small foci of restricted diffusion (DWI hyperintense/ADC hypointense)   within the right frontal cortical/subcortical region and right frontal   centrum semiovale. These foci measure up to 7 mm and 4 mm respectively,   and are most consistent with punctate acute infarcts. FLAIR hyperintense   signal corresponding to right frontal centrum semiovale infarct is noted.   No corresponding SWI signal loss to suggest hemorrhagic transformation.    A 0.6 cm focus of SWI signal loss within the left superior cerebellum,   likely reflects prior chronic microhemorrhage (image 10, series 7).   Additional similar punctate foci of microhemorrhage within the bifrontal   cortical/subcortical region (images 10-18, series 7).    Additional scattered T2/FLAIR hyperintensities within the   subcortical/periventricular white matter, nonspecific but most commonly   suggestive of prior chronic lacunar infarcts and/or sequela of chronic   small vessel ischemia.    There is no mass effect, edema, or midline shift. The basal cisterns are   patent.  There is no hydrocephalus.  No extra-axial collection is seen.    The corpus callosum, optic chiasm and pineal gland are normal in   configuration.  Partially empty sella, a nonspecific finding. Normal pituitary gland   consideration    No cerebellar tonsillar herniation/ectopia. No Chiari malformation.    Changes from bilateral lens placement surgery. The visualized paranasal   sinuses and mastoid/middle ear cavities are clear.    Preserved T2 flow-voids along the dural venous sinuses and proximal   [place], which are better evaluated on recent CTA/CTP.      IMPRESSION:    1.  Two punctate acute infarcts within the right frontal lobe, as   described. No hemorrhagic transformation or midline shift. Consider   follow-up imaging as clinically indicated.    2.  Nonspecific findings, most commonly suggestive of prior chronic   lacunar infarcts and chronic small vessel ischemia. Few prior chronic   microhemorrhages are noted.      < end of copied text >    12-05    140  |  109<H>  |  8   ----------------------------<  64<L>  3.6   |  24  |  0.98    Ca    9.1      05 Dec 2022 05:30          CAPILLARY BLOOD GLUCOSE      POCT Blood Glucose.: 100 mg/dL (06 Dec 2022 07:45)  POCT Blood Glucose.: 173 mg/dL (05 Dec 2022 22:18)  POCT Blood Glucose.: 94 mg/dL (05 Dec 2022 17:29)          RADIOLOGY & ADDITIONAL TESTS:        Consultant(s) Notes Reviewed:  [ ] YES  [ ] NO

## 2022-12-14 NOTE — PROGRESS NOTE ADULT - PROBLEM SELECTOR PLAN 1
·  Problem: CVA (cerebral vascular accident).   ·  Plan: Neuro consult appreciated.  MR head noted  TTE  serial troponin  ASA, Statin  neuro checks  Monitoring.
Neuro consult appreciated.  MR head noted  TTE  serial troponin  ASA, Statin  neuro checks  Monitoring
·  Problem: CVA (cerebral vascular accident).   ·  Plan: Neuro consult appreciated.  MR head noted  TTE  serial troponin  ASA, Statin  neuro checks  Monitoring.
·  Problem: CVA (cerebral vascular accident).   ·  Plan: Neuro consult appreciated.  MR head noted  TTE  serial troponin  ASA, Statin  neuro checks  Monitoring.
Neuro consult appreciated.  MR head noted  TTE  serial troponin  ASA, Statin  neuro checks  Monitoring
Neuro consult appreciated.  MR head noted  TTE  serial troponin  ASA, Statin  neuro checks  Monitoring

## 2022-12-14 NOTE — PROGRESS NOTE ADULT - TIME BILLING
Neuro following;   A/P:  Acute right frontal stroke  Old strokes  Fibromyalgia  HTN  Gout  - aspirin and statin for secondary stroke prevention  - PT/OT  - discharge planning to rehab      PT, HARPAL  Patient for Rehab.  Will discuss with daughter and updated  Discharge planning  Continue current care and treatment.
Neuro following;   A/P:  Acute right frontal stroke  Old strokes  Fibromyalgia  HTN  Gout  - aspirin and statin for secondary stroke prevention  - PT/OT  - discharge planning to rehab      PT, HARPAL  Patient for Rehab.  Awaiting ins, auth  Discharge planning  Continue current care and treatment.
Neuro following;   A/P:  Acute right frontal stroke  Old strokes  Fibromyalgia  HTN  Gout  - aspirin and statin for secondary stroke prevention  - PT/OT  - discharge planning to rehab      PT, HARPAL  Patient for Rehab.  Still Awaiting ins, auth  Discharge planning  Continue current care and treatment.
Neuro following; - aspirin and statin for secondary stroke prevention  - MRI brain and Echo pending  - PT/OT    Continue current care and treatment.
Neuro following;   A/P:  Acute right frontal stroke  Old strokes  Fibromyalgia  HTN  Gout  - aspirin and statin for secondary stroke prevention  - PT/OT  - discharge planning to rehab      PT, HARPAL  Patient for Rehab.  Still Awaiting ins, auth  Discharge planning  Continue current care and treatment.
Neuro following;   A/P:  Acute right frontal stroke  Old strokes  Fibromyalgia  HTN  Gout  - aspirin and statin for secondary stroke prevention  - PT/OT  - discharge planning to rehab      PT, HARPAL  Patient resistant to going to Rehab.  Will discuss with daughter  Discharge planning  Continue current care and treatment.

## 2022-12-21 DIAGNOSIS — R29.701 NIHSS SCORE 1: ICD-10-CM

## 2022-12-21 DIAGNOSIS — M10.9 GOUT, UNSPECIFIED: ICD-10-CM

## 2022-12-21 DIAGNOSIS — I63.89 OTHER CEREBRAL INFARCTION: ICD-10-CM

## 2022-12-21 DIAGNOSIS — I10 ESSENTIAL (PRIMARY) HYPERTENSION: ICD-10-CM

## 2022-12-21 DIAGNOSIS — R47.81 SLURRED SPEECH: ICD-10-CM

## 2022-12-21 DIAGNOSIS — E44.0 MODERATE PROTEIN-CALORIE MALNUTRITION: ICD-10-CM

## 2022-12-21 DIAGNOSIS — Z96.643 PRESENCE OF ARTIFICIAL HIP JOINT, BILATERAL: ICD-10-CM

## 2022-12-21 DIAGNOSIS — M79.7 FIBROMYALGIA: ICD-10-CM

## 2022-12-30 ENCOUNTER — OUTPATIENT (OUTPATIENT)
Dept: OUTPATIENT SERVICES | Facility: HOSPITAL | Age: 87
LOS: 1 days | Discharge: HOME | End: 2022-12-30
Payer: MEDICARE

## 2022-12-30 DIAGNOSIS — R94.31 ABNORMAL ELECTROCARDIOGRAM [ECG] [EKG]: ICD-10-CM

## 2022-12-30 PROCEDURE — 71275 CT ANGIOGRAPHY CHEST: CPT | Mod: 26

## 2022-12-30 PROCEDURE — 76937 US GUIDE VASCULAR ACCESS: CPT | Mod: 26

## 2022-12-30 PROCEDURE — 36410 VNPNXR 3YR/> PHY/QHP DX/THER: CPT

## 2022-12-30 NOTE — ASU DISCHARGE PLAN (ADULT/PEDIATRIC) - NS MD DC FALL RISK RISK
For information on Fall & Injury Prevention, visit: [email or web address] OR  [email or web address] OR  https://www.cdc.gov/steadi/patient.html

## 2022-12-31 ENCOUNTER — EMERGENCY (EMERGENCY)
Facility: HOSPITAL | Age: 87
LOS: 0 days | Discharge: HOME | End: 2023-01-01
Attending: STUDENT IN AN ORGANIZED HEALTH CARE EDUCATION/TRAINING PROGRAM
Payer: MEDICARE

## 2022-12-31 VITALS
HEIGHT: 66.5 IN | WEIGHT: 178.35 LBS | HEART RATE: 82 BPM | DIASTOLIC BLOOD PRESSURE: 75 MMHG | SYSTOLIC BLOOD PRESSURE: 130 MMHG | OXYGEN SATURATION: 99 % | RESPIRATION RATE: 17 BRPM | TEMPERATURE: 99 F

## 2022-12-31 DIAGNOSIS — M79.7 FIBROMYALGIA: ICD-10-CM

## 2022-12-31 DIAGNOSIS — M19.90 UNSPECIFIED OSTEOARTHRITIS, UNSPECIFIED SITE: ICD-10-CM

## 2022-12-31 DIAGNOSIS — G62.9 POLYNEUROPATHY, UNSPECIFIED: ICD-10-CM

## 2022-12-31 DIAGNOSIS — R06.02 SHORTNESS OF BREATH: ICD-10-CM

## 2022-12-31 DIAGNOSIS — R11.0 NAUSEA: ICD-10-CM

## 2022-12-31 DIAGNOSIS — R07.89 OTHER CHEST PAIN: ICD-10-CM

## 2022-12-31 DIAGNOSIS — Z86.711 PERSONAL HISTORY OF PULMONARY EMBOLISM: ICD-10-CM

## 2022-12-31 DIAGNOSIS — I10 ESSENTIAL (PRIMARY) HYPERTENSION: ICD-10-CM

## 2022-12-31 DIAGNOSIS — I69.354 HEMIPLEGIA AND HEMIPARESIS FOLLOWING CEREBRAL INFARCTION AFFECTING LEFT NON-DOMINANT SIDE: ICD-10-CM

## 2022-12-31 DIAGNOSIS — Z79.82 LONG TERM (CURRENT) USE OF ASPIRIN: ICD-10-CM

## 2022-12-31 PROCEDURE — 99285 EMERGENCY DEPT VISIT HI MDM: CPT

## 2022-12-31 PROCEDURE — 93010 ELECTROCARDIOGRAM REPORT: CPT

## 2022-12-31 NOTE — ED ADULT TRIAGE NOTE - CHIEF COMPLAINT QUOTE
GORGE by myra aguilera for sudden onset of chest pain around 2305, diagnosed with PE yesterday, patient has molst form from [place]

## 2022-12-31 NOTE — ED ADULT TRIAGE NOTE - AS TEMP SITE
[de-identified] : 70 year old female patient returns 3 months s/p left TKR done on 7/6/2021. She has continued going to outpatient PT. She also returned to her  twice a week for upper body and core. She is thrilled with her progress and result. She has not been using a cane for a while. She wants to continue with PT. She notes intermittent numbness along the lateral aspect of the knee, and pain into the shin if she walks too fast. Of note, Her most recent right knee Monovisc injection was in 03/2020. Her BMI is 44.4 (weight = 220 pounds, Height = 4 feet, 11 inches). Patient is fully COVID vaccinated as of February 2021. oral

## 2023-01-01 VITALS
OXYGEN SATURATION: 99 % | RESPIRATION RATE: 18 BRPM | SYSTOLIC BLOOD PRESSURE: 111 MMHG | HEART RATE: 75 BPM | DIASTOLIC BLOOD PRESSURE: 75 MMHG

## 2023-01-01 LAB
ALBUMIN SERPL ELPH-MCNC: 2.9 G/DL — LOW (ref 3.3–5)
ALP SERPL-CCNC: 82 U/L — SIGNIFICANT CHANGE UP (ref 40–120)
ALT FLD-CCNC: 19 U/L — SIGNIFICANT CHANGE UP (ref 12–78)
ANION GAP SERPL CALC-SCNC: 5 MMOL/L — SIGNIFICANT CHANGE UP (ref 5–17)
APTT BLD: 26.4 SEC — LOW (ref 27.5–35.5)
AST SERPL-CCNC: 16 U/L — SIGNIFICANT CHANGE UP (ref 15–37)
BASOPHILS # BLD AUTO: 0.02 K/UL — SIGNIFICANT CHANGE UP (ref 0–0.2)
BASOPHILS NFR BLD AUTO: 0.4 % — SIGNIFICANT CHANGE UP (ref 0–2)
BILIRUB SERPL-MCNC: 0.3 MG/DL — SIGNIFICANT CHANGE UP (ref 0.2–1.2)
BUN SERPL-MCNC: 16 MG/DL — SIGNIFICANT CHANGE UP (ref 7–23)
CALCIUM SERPL-MCNC: 8.6 MG/DL — SIGNIFICANT CHANGE UP (ref 8.5–10.1)
CHLORIDE SERPL-SCNC: 111 MMOL/L — HIGH (ref 96–108)
CK MB BLD-MCNC: 1.8 % — SIGNIFICANT CHANGE UP (ref 0–3.5)
CK MB BLD-MCNC: <2.1 % — SIGNIFICANT CHANGE UP (ref 0–3.5)
CK MB CFR SERPL CALC: 1.1 NG/ML — SIGNIFICANT CHANGE UP (ref 0.5–3.6)
CK MB CFR SERPL CALC: <1 NG/ML — SIGNIFICANT CHANGE UP (ref 0.5–3.6)
CK SERPL-CCNC: 47 U/L — SIGNIFICANT CHANGE UP (ref 26–192)
CK SERPL-CCNC: 60 U/L — SIGNIFICANT CHANGE UP (ref 26–192)
CO2 SERPL-SCNC: 29 MMOL/L — SIGNIFICANT CHANGE UP (ref 22–31)
CREAT SERPL-MCNC: 1.03 MG/DL — SIGNIFICANT CHANGE UP (ref 0.5–1.3)
EGFR: 52 ML/MIN/1.73M2 — LOW
EOSINOPHIL # BLD AUTO: 0.2 K/UL — SIGNIFICANT CHANGE UP (ref 0–0.5)
EOSINOPHIL NFR BLD AUTO: 3.6 % — SIGNIFICANT CHANGE UP (ref 0–6)
GLUCOSE SERPL-MCNC: 97 MG/DL — SIGNIFICANT CHANGE UP (ref 70–99)
HCT VFR BLD CALC: 33.2 % — LOW (ref 34.5–45)
HGB BLD-MCNC: 10.6 G/DL — LOW (ref 11.5–15.5)
IMM GRANULOCYTES NFR BLD AUTO: 0.7 % — SIGNIFICANT CHANGE UP (ref 0–0.9)
INR BLD: 1.08 RATIO — SIGNIFICANT CHANGE UP (ref 0.88–1.16)
LYMPHOCYTES # BLD AUTO: 1.66 K/UL — SIGNIFICANT CHANGE UP (ref 1–3.3)
LYMPHOCYTES # BLD AUTO: 30.1 % — SIGNIFICANT CHANGE UP (ref 13–44)
MCHC RBC-ENTMCNC: 27.2 PG — SIGNIFICANT CHANGE UP (ref 27–34)
MCHC RBC-ENTMCNC: 31.9 G/DL — LOW (ref 32–36)
MCV RBC AUTO: 85.1 FL — SIGNIFICANT CHANGE UP (ref 80–100)
MONOCYTES # BLD AUTO: 0.85 K/UL — SIGNIFICANT CHANGE UP (ref 0–0.9)
MONOCYTES NFR BLD AUTO: 15.4 % — HIGH (ref 2–14)
NEUTROPHILS # BLD AUTO: 2.74 K/UL — SIGNIFICANT CHANGE UP (ref 1.8–7.4)
NEUTROPHILS NFR BLD AUTO: 49.8 % — SIGNIFICANT CHANGE UP (ref 43–77)
NRBC # BLD: 0 /100 WBCS — SIGNIFICANT CHANGE UP (ref 0–0)
PLATELET # BLD AUTO: 210 K/UL — SIGNIFICANT CHANGE UP (ref 150–400)
POTASSIUM SERPL-MCNC: 4 MMOL/L — SIGNIFICANT CHANGE UP (ref 3.5–5.3)
POTASSIUM SERPL-SCNC: 4 MMOL/L — SIGNIFICANT CHANGE UP (ref 3.5–5.3)
PROT SERPL-MCNC: 6.9 GM/DL — SIGNIFICANT CHANGE UP (ref 6–8.3)
PROTHROM AB SERPL-ACNC: 13 SEC — SIGNIFICANT CHANGE UP (ref 10.5–13.4)
RBC # BLD: 3.9 M/UL — SIGNIFICANT CHANGE UP (ref 3.8–5.2)
RBC # FLD: 16.3 % — HIGH (ref 10.3–14.5)
SODIUM SERPL-SCNC: 145 MMOL/L — SIGNIFICANT CHANGE UP (ref 135–145)
TROPONIN I, HIGH SENSITIVITY RESULT: 31.3 NG/L — SIGNIFICANT CHANGE UP
TROPONIN I, HIGH SENSITIVITY RESULT: 31.6 NG/L — SIGNIFICANT CHANGE UP
WBC # BLD: 5.51 K/UL — SIGNIFICANT CHANGE UP (ref 3.8–10.5)
WBC # FLD AUTO: 5.51 K/UL — SIGNIFICANT CHANGE UP (ref 3.8–10.5)

## 2023-01-01 PROCEDURE — 71275 CT ANGIOGRAPHY CHEST: CPT | Mod: 26,MA

## 2023-01-01 PROCEDURE — 71045 X-RAY EXAM CHEST 1 VIEW: CPT | Mod: 26

## 2023-01-01 RX ORDER — ACETAMINOPHEN 500 MG
1000 TABLET ORAL ONCE
Refills: 0 | Status: COMPLETED | OUTPATIENT
Start: 2023-01-01 | End: 2023-01-01

## 2023-01-01 RX ORDER — ONDANSETRON 8 MG/1
4 TABLET, FILM COATED ORAL ONCE
Refills: 0 | Status: COMPLETED | OUTPATIENT
Start: 2023-01-01 | End: 2023-01-01

## 2023-01-01 RX ADMIN — Medication 1000 MILLIGRAM(S): at 03:00

## 2023-01-01 RX ADMIN — Medication 400 MILLIGRAM(S): at 02:03

## 2023-01-01 RX ADMIN — ONDANSETRON 4 MILLIGRAM(S): 8 TABLET, FILM COATED ORAL at 01:24

## 2023-01-01 RX ADMIN — Medication 1000 MILLIGRAM(S): at 03:46

## 2023-01-01 NOTE — ED ADULT NURSE REASSESSMENT NOTE - NS ED NURSE REASSESS COMMENT FT1
Received patient from overnight RN found laying supine, head elevated, on stretcher.  Patient brought to ED from [facility] for chest pain.  All results negative.  To be discharged back to [facility].  [facility] staff called to get report on patient, and will transfer patient back to facility.  No signs of distress.

## 2023-01-01 NOTE — ED ADULT NURSE NOTE - CHIEF COMPLAINT QUOTE
GORGE by myra aguilera for sudden onset of chest pain around 2305, diagnosed with PE yesterday, patient has molst form from [place]

## 2023-01-01 NOTE — ED PROVIDER NOTE - OBJECTIVE STATEMENT
89 year old female, sent in for chest pain, midsternal pain radiating to neck and arm, mild sob and nausea (-) vomiting (-) diaphoresis m pt was recently diagnosed with PE 89 year old female, sent in from [facility] for sudden onset of SOB, midsternal pain radiating to neck and arm associated with nausea (-) vomiting (-) diaphoresis m pt was recently diagnosed with PE

## 2023-01-01 NOTE — ED ADULT NURSE NOTE - NSIMPLEMENTINTERV_GEN_ALL_ED
Implemented All Fall Risk Interventions:  [place] to call system. Call bell, personal items and telephone within reach. Instruct patient to call for assistance. Room bathroom lighting operational. Non-slip footwear when patient is off stretcher. Physically safe environment: no spills, clutter or unnecessary equipment. Stretcher in lowest position, wheels locked, appropriate side rails in place. Provide visual cue, wrist band, yellow gown, etc. Monitor gait and stability. Monitor for mental status changes and reorient to person, place, and time. Review medications for side effects contributing to fall risk. Reinforce activity limits and safety measures with patient and family.

## 2023-01-01 NOTE — ED PROVIDER NOTE - CLINICAL SUMMARY MEDICAL DECISION MAKING FREE TEXT BOX
pt presents today sent in from [facility] for sob, chest pain and mild nausea, pt was recently diagnosed with PE, exam benign, workup today show small RUL sugmental PE not seen in prior ct, dr florez made aware and wants pt discharged back to [facility]

## 2023-01-01 NOTE — ED ADULT NURSE NOTE - OBJECTIVE STATEMENT
Brought to the ED from [facility] for with c/o of sudden onset fo CP that started approximately 23:00. Pt also reporting SOB with intermittent nausea. Pt verbalizes recent dx with PE v/s PN.

## 2023-01-01 NOTE — ED PROVIDER NOTE - PATIENT PORTAL LINK FT
You can access the FollowMyHealth Patient Portal offered by [facility] by registering at the following website: [email or web address]. By joining [facility]’s FollowMyHealth portal, you will also be able to view your health information using other applications (apps) compatible with our system.

## 2023-01-08 ENCOUNTER — EMERGENCY (EMERGENCY)
Facility: HOSPITAL | Age: 88
LOS: 0 days | Discharge: HOME | End: 2023-01-09
Attending: EMERGENCY MEDICINE
Payer: MEDICARE

## 2023-01-08 VITALS
HEIGHT: 66.5 IN | OXYGEN SATURATION: 99 % | TEMPERATURE: 97 F | RESPIRATION RATE: 22 BRPM | SYSTOLIC BLOOD PRESSURE: 145 MMHG | DIASTOLIC BLOOD PRESSURE: 81 MMHG | WEIGHT: 185.63 LBS | HEART RATE: 96 BPM

## 2023-01-08 DIAGNOSIS — R07.89 OTHER CHEST PAIN: ICD-10-CM

## 2023-01-08 DIAGNOSIS — R06.02 SHORTNESS OF BREATH: ICD-10-CM

## 2023-01-08 DIAGNOSIS — I10 ESSENTIAL (PRIMARY) HYPERTENSION: ICD-10-CM

## 2023-01-08 DIAGNOSIS — Z86.73 PERSONAL HISTORY OF TRANSIENT ISCHEMIC ATTACK (TIA), AND CEREBRAL INFARCTION WITHOUT RESIDUAL DEFICITS: ICD-10-CM

## 2023-01-08 DIAGNOSIS — Z20.822 CONTACT WITH AND (SUSPECTED) EXPOSURE TO COVID-19: ICD-10-CM

## 2023-01-08 DIAGNOSIS — M10.9 GOUT, UNSPECIFIED: ICD-10-CM

## 2023-01-08 DIAGNOSIS — Z79.82 LONG TERM (CURRENT) USE OF ASPIRIN: ICD-10-CM

## 2023-01-08 DIAGNOSIS — Z86.711 PERSONAL HISTORY OF PULMONARY EMBOLISM: ICD-10-CM

## 2023-01-08 LAB
ALBUMIN SERPL ELPH-MCNC: 3.3 G/DL — SIGNIFICANT CHANGE UP (ref 3.3–5)
ALP SERPL-CCNC: 101 U/L — SIGNIFICANT CHANGE UP (ref 40–120)
ALT FLD-CCNC: 26 U/L — SIGNIFICANT CHANGE UP (ref 12–78)
ANION GAP SERPL CALC-SCNC: 5 MMOL/L — SIGNIFICANT CHANGE UP (ref 5–17)
APTT BLD: 33.9 SEC — SIGNIFICANT CHANGE UP (ref 27.5–35.5)
AST SERPL-CCNC: 19 U/L — SIGNIFICANT CHANGE UP (ref 15–37)
BASE EXCESS BLDA CALC-SCNC: 2.4 MMOL/L — SIGNIFICANT CHANGE UP (ref -2–3)
BASOPHILS # BLD AUTO: 0.02 K/UL — SIGNIFICANT CHANGE UP (ref 0–0.2)
BASOPHILS NFR BLD AUTO: 0.3 % — SIGNIFICANT CHANGE UP (ref 0–2)
BILIRUB SERPL-MCNC: 0.3 MG/DL — SIGNIFICANT CHANGE UP (ref 0.2–1.2)
BLOOD GAS COMMENTS ARTERIAL: SIGNIFICANT CHANGE UP
BUN SERPL-MCNC: 26 MG/DL — HIGH (ref 7–23)
CALCIUM SERPL-MCNC: 9 MG/DL — SIGNIFICANT CHANGE UP (ref 8.5–10.1)
CHLORIDE SERPL-SCNC: 111 MMOL/L — HIGH (ref 96–108)
CO2 BLDA-SCNC: 28 MMOL/L — HIGH (ref 19–24)
CO2 SERPL-SCNC: 27 MMOL/L — SIGNIFICANT CHANGE UP (ref 22–31)
CREAT SERPL-MCNC: 0.9 MG/DL — SIGNIFICANT CHANGE UP (ref 0.5–1.3)
D DIMER BLD IA.RAPID-MCNC: 243 NG/ML DDU — HIGH
EGFR: 61 ML/MIN/1.73M2 — SIGNIFICANT CHANGE UP
EOSINOPHIL # BLD AUTO: 0.01 K/UL — SIGNIFICANT CHANGE UP (ref 0–0.5)
EOSINOPHIL NFR BLD AUTO: 0.1 % — SIGNIFICANT CHANGE UP (ref 0–6)
FLUAV AG NPH QL: SIGNIFICANT CHANGE UP
FLUBV AG NPH QL: SIGNIFICANT CHANGE UP
GAS PNL BLDA: SIGNIFICANT CHANGE UP
GLUCOSE SERPL-MCNC: 142 MG/DL — HIGH (ref 70–99)
HCO3 BLDA-SCNC: 27 MMOL/L — SIGNIFICANT CHANGE UP (ref 21–28)
HCT VFR BLD CALC: 32.2 % — LOW (ref 34.5–45)
HGB BLD-MCNC: 10.1 G/DL — LOW (ref 11.5–15.5)
HOROWITZ INDEX BLDA+IHG-RTO: 30 — SIGNIFICANT CHANGE UP
IMM GRANULOCYTES NFR BLD AUTO: 1.6 % — HIGH (ref 0–0.9)
INR BLD: 1.66 RATIO — HIGH (ref 0.88–1.16)
LACTATE SERPL-SCNC: 1.5 MMOL/L — SIGNIFICANT CHANGE UP (ref 0.7–2)
LYMPHOCYTES # BLD AUTO: 0.98 K/UL — LOW (ref 1–3.3)
LYMPHOCYTES # BLD AUTO: 14.4 % — SIGNIFICANT CHANGE UP (ref 13–44)
MCHC RBC-ENTMCNC: 27.1 PG — SIGNIFICANT CHANGE UP (ref 27–34)
MCHC RBC-ENTMCNC: 31.4 G/DL — LOW (ref 32–36)
MCV RBC AUTO: 86.3 FL — SIGNIFICANT CHANGE UP (ref 80–100)
MONOCYTES # BLD AUTO: 0.42 K/UL — SIGNIFICANT CHANGE UP (ref 0–0.9)
MONOCYTES NFR BLD AUTO: 6.2 % — SIGNIFICANT CHANGE UP (ref 2–14)
NEUTROPHILS # BLD AUTO: 5.27 K/UL — SIGNIFICANT CHANGE UP (ref 1.8–7.4)
NEUTROPHILS NFR BLD AUTO: 77.4 % — HIGH (ref 43–77)
NRBC # BLD: 0 /100 WBCS — SIGNIFICANT CHANGE UP (ref 0–0)
NT-PROBNP SERPL-SCNC: 405 PG/ML — SIGNIFICANT CHANGE UP (ref 0–450)
PCO2 BLDA: 42 MMHG — SIGNIFICANT CHANGE UP (ref 32–46)
PH BLDA: 7.42 — SIGNIFICANT CHANGE UP (ref 7.35–7.45)
PLATELET # BLD AUTO: 310 K/UL — SIGNIFICANT CHANGE UP (ref 150–400)
PO2 BLDA: 141 MMHG — HIGH (ref 83–108)
POTASSIUM SERPL-MCNC: 3.8 MMOL/L — SIGNIFICANT CHANGE UP (ref 3.5–5.3)
POTASSIUM SERPL-SCNC: 3.8 MMOL/L — SIGNIFICANT CHANGE UP (ref 3.5–5.3)
PROT SERPL-MCNC: 7.8 GM/DL — SIGNIFICANT CHANGE UP (ref 6–8.3)
PROTHROM AB SERPL-ACNC: 19.9 SEC — HIGH (ref 10.5–13.4)
RBC # BLD: 3.73 M/UL — LOW (ref 3.8–5.2)
RBC # FLD: 15.7 % — HIGH (ref 10.3–14.5)
SAO2 % BLDA: 99.3 % — HIGH (ref 94–98)
SARS-COV-2 RNA SPEC QL NAA+PROBE: SIGNIFICANT CHANGE UP
SODIUM SERPL-SCNC: 143 MMOL/L — SIGNIFICANT CHANGE UP (ref 135–145)
TROPONIN I, HIGH SENSITIVITY RESULT: 27.5 NG/L — SIGNIFICANT CHANGE UP
WBC # BLD: 6.81 K/UL — SIGNIFICANT CHANGE UP (ref 3.8–10.5)
WBC # FLD AUTO: 6.81 K/UL — SIGNIFICANT CHANGE UP (ref 3.8–10.5)

## 2023-01-08 PROCEDURE — 71045 X-RAY EXAM CHEST 1 VIEW: CPT | Mod: 26

## 2023-01-08 PROCEDURE — 93010 ELECTROCARDIOGRAM REPORT: CPT

## 2023-01-08 PROCEDURE — 99285 EMERGENCY DEPT VISIT HI MDM: CPT

## 2023-01-08 NOTE — ED PROVIDER NOTE - PATIENT PORTAL LINK FT
You can access the FollowMyHealth Patient Portal offered by [facility] by registering at the following website: [email or web address]. By joining [facility]’s FollowMyHealth portal, you will also be able to view your health information using other applications (apps) compatible with our system.

## 2023-01-08 NOTE — ED ADULT NURSE NOTE - SUICIDE SCREENING QUESTION 1
PT DAILY TREATMENT NOTE     Patient Name: Phong Weinstein  Date:2020  : 1944  [x]  Patient  Verified  Payor: [place] MEDICARE / Plan: [place] MEDICARE PART A & B / Product Type: Medicare /    In time:8:31  Out time:9:10  Total Treatment Time (min): 39  Visit #: 4 of 8    Medicare/BCBS Only   Total Timed Codes (min):  39 1:1 Treatment Time:  39       Treatment Area: Pain in left knee [M25.562]    SUBJECTIVE  Pain Level (0-10 scale): 1-2/10  Any medication changes, allergies to medications, adverse drug reactions, diagnosis change, or new procedure performed?: [x] No    [] Yes (see summary sheet for update)  Subjective functional status/changes:   [] No changes reported  \"It's about the same, minimal pain right now. \"    OBJECTIVE    24 min Therapeutic Exercise:  [x] See flow sheet :   Rationale: increase ROM and increase strength to improve the patients ability to perform ADL's. 15 min Neuromuscular Re-education:  [x]  See flow sheet : Static balance, dynamic step ups, low hurdles. Rationale: increase strength, improve balance and increase proprioception  to improve the patients ability to perform functional activities. With   [x] TE   [] TA   [] neuro   [] other: Patient Education: [x] Review HEP    [] Progressed/Changed HEP based on:   [] positioning   [] body mechanics   [] transfers   [] heat/ice application    [] other:      Other Objective/Functional Measures: Pain primarily with lateral steps with squat, unable to maintain squat form secondary to Left knee pain. Pain Level (0-10 scale) post treatment: 0/10    ASSESSMENT/Changes in Function: Pt fully participated in treatment, denied pain with most of treatment, difficulty with performing lateral steps with squats. Continue PT to increase Left LE strength/stability to improve ease of performing functional activities.     Patient will continue to benefit from skilled PT services to modify and progress therapeutic interventions, address functional mobility deficits, address ROM deficits, address strength deficits, analyze and cue movement patterns and analyze and modify body mechanics/ergonomics to attain remaining goals. [x]  See Plan of Care  []  See progress note/recertification  []  See Discharge Summary         Progress towards goals / Updated goals:  Goals for this certification period to be accomplished in 4 weeks:              1. Improve left glute med MMT to 4/5 to improve ability for gait              PN- 4-/5              2.  The pt will report a little difficulty with performing light activities at home.              PN- moderate              3. Improve Romberg test with EC to 30 sec for decreased fall risk with ADL             PN: 25 seconds 10/27/2020              Current: 30 seconds without LOB 11/06/2020    PLAN  []  Upgrade activities as tolerated     [x]  Continue plan of care  []  Update interventions per flow sheet       []  Discharge due to:_  []  Other:_      Justina Corona PTA 11/20/2020  8:30 AM    Future Appointments   Date Time Provider Jesús Camachoi   11/23/2020  9:00 AM Shashank Traore, PT [facility]   11/27/2020 10:00 AM Mikayla Hernandez PTA [facility]   11/30/2020 10:00 AM Naima Kathleen, PT [facility]   12/2/2020  2:30 PM Maria A Garcia MD [facility] No

## 2023-01-08 NOTE — ED PROVIDER NOTE - CLINICAL SUMMARY MEDICAL DECISION MAKING FREE TEXT BOX
Patient brought from [facility] for shortness of breath.  VSS, initially was started on bipap by triage RN, however backed off on Bipap and is 99% on RA.  Lab values reviewed, there are no values which require acute intervention.  Blood gas normal.  Serial EKG and troponins stable.  CTA chest negative for acute PE, patient did not fail oral anticoagulation.  UA negative.  Patient appears well, no longer sob, feel she is ok to return to [facility] where she can be under supervision.  Patient discharged back to [facility].

## 2023-01-08 NOTE — ED PROVIDER NOTE - PHYSICAL EXAMINATION
Gen: Alert, distressed  Head: NC, AT, EOMI, normal lids/conjunctiva  ENT: normal hearing, patent oropharynx without erythema/exudate, uvula midline  Neck: +supple, no tenderness/meningismus/JVD, +Trachea midline  Pulm: Decreased right sided breath sounds, increased resp effort, + retractions  CV: RRR, no M/R/G, +dist pulses  Abd: soft, NT/ND, Negative Barto signs, +BS, no palpable masses  Mskel: no edema/erythema/cyanosis  Skin: no rash, warm/dry  Neuro: AAOx3, no apparent sensory/motor deficits, coordination intact

## 2023-01-08 NOTE — ED ADULT TRIAGE NOTE - CHIEF COMPLAINT QUOTE
from [facility],  per RN MATILDA,  acute onset CP & SOB x 10 minutes. pt klutching chest stating "I don't feel right"   (PMH-PE, CVA) from [facility],  per RN MATILDA,  acute onset CP & SOB x 10 minutes. pt klutching chest stating "I don't feel right"   (PMH-PE, CVA, DNR/DNI)

## 2023-01-08 NOTE — ED PROVIDER NOTE - EKG ADDITIONAL INFORMATION FREE TEXT
sinus rhythm, rate 98, no acute st e/d, no twi, qtc 426 sinus rhythm, rate 98, no acute st e/d, no twi, qtc 426  Repeat sinus rhythm, rate 70, no acute st e/d, no twi, qtc 427

## 2023-01-08 NOTE — ED PROVIDER NOTE - OBJECTIVE STATEMENT
Pertinent PMH/PSH/FHx/SHx and Review of Systems contained within:  Patient presents to the ED for chest pain and shortness of breath.  Patient comes from [facility] after admission 1 month ago for CVA.  She complains of "sticking" pain and shortness of breath, started at rest, patient in respiratory distress, started on Bipap for work of breathing on arrival.  Patient recently found with small PE, currently being managed on aspirin, not on anticoagulation per chart.      Relevant PMHx/SHx/SOCHx/FAMH:  HTN, CVA x3, gout, carpal tunnel, PE  Patient denies EtOH/tobacco/illicit substance use.    ROS: No fever/chills, No headache/photophobia/eye pain/changes in vision, No ear pain/sore throat/dysphagia, No palpitations, no cough/wheeze/stridor, No abdominal pain, No N/V/D/melena, no dysuria/frequency/discharge, No neck/back pain, no rash, no changes in neurological status/function. Pertinent PMH/PSH/FHx/SHx and Review of Systems contained within:  Patient presents to the ED for chest pain and shortness of breath.  Patient comes from [facility] after admission 1 month ago for CVA.  She complains of "sticking" pain and shortness of breath, started at rest, patient in respiratory distress, started on Bipap for work of breathing on arrival.  Patient recently found with small PE, currently being managed on eliquis at [facility].  Patient is DNR/DNI.    Relevant PMHx/SHx/SOCHx/FAMH:  HTN, CVA x3, gout, carpal tunnel, PE  Patient denies EtOH/tobacco/illicit substance use.    ROS: No fever/chills, No headache/photophobia/eye pain/changes in vision, No ear pain/sore throat/dysphagia, No palpitations, no cough/wheeze/stridor, No abdominal pain, No N/V/D/melena, no dysuria/frequency/discharge, No neck/back pain, no rash, no changes in neurological status/function.

## 2023-01-08 NOTE — ED ADULT NURSE NOTE - OBJECTIVE STATEMENT
AAOx3 pt from [facility] for SOB and difficulty breathing while sleeping. Pt states "my chest feels sticky". Pt unable to speak in full sentences, pt tachypneic, accessory muscles used to breath. Pt placed on BiPAP at time of assessment. No swelling noted.  PMH-PE, CVA, DNR/DNI AAOx3 pt from [facility] for SOB and difficulty breathing while sleeping. Pt states "my chest feels sticky". Pt unable to speak in full sentences, pt tachypneic, accessory muscles used to breath. Pt placed on BiPAP at time of assessment. No swelling noted.  Pt has PICC line on right upper arm. PMH-PE, CVA, DNR/DNI

## 2023-01-08 NOTE — ED ADULT NURSE NOTE - CHIEF COMPLAINT QUOTE
from [facility],  per RN MATILDA,  acute onset CP & SOB x 10 minutes. pt klutching chest stating "I don't feel right"   (PMH-PE, CVA, DNR/DNI)

## 2023-01-09 VITALS
DIASTOLIC BLOOD PRESSURE: 69 MMHG | TEMPERATURE: 98 F | OXYGEN SATURATION: 100 % | RESPIRATION RATE: 20 BRPM | SYSTOLIC BLOOD PRESSURE: 145 MMHG | HEART RATE: 62 BPM

## 2023-01-09 LAB
APPEARANCE UR: CLEAR — SIGNIFICANT CHANGE UP
BACTERIA # UR AUTO: ABNORMAL
BILIRUB UR-MCNC: NEGATIVE — SIGNIFICANT CHANGE UP
COLOR SPEC: YELLOW — SIGNIFICANT CHANGE UP
DIFF PNL FLD: NEGATIVE — SIGNIFICANT CHANGE UP
EPI CELLS # UR: NEGATIVE — SIGNIFICANT CHANGE UP
GLUCOSE UR QL: NEGATIVE MG/DL — SIGNIFICANT CHANGE UP
KETONES UR-MCNC: NEGATIVE — SIGNIFICANT CHANGE UP
LEUKOCYTE ESTERASE UR-ACNC: NEGATIVE — SIGNIFICANT CHANGE UP
NITRITE UR-MCNC: NEGATIVE — SIGNIFICANT CHANGE UP
PH UR: 6 — SIGNIFICANT CHANGE UP (ref 5–8)
PROT UR-MCNC: 15 MG/DL
RBC CASTS # UR COMP ASSIST: SIGNIFICANT CHANGE UP /HPF (ref 0–4)
SP GR SPEC: 1.01 — SIGNIFICANT CHANGE UP (ref 1.01–1.02)
TROPONIN I, HIGH SENSITIVITY RESULT: 31.1 NG/L — SIGNIFICANT CHANGE UP
UROBILINOGEN FLD QL: NEGATIVE MG/DL — SIGNIFICANT CHANGE UP
WBC UR QL: SIGNIFICANT CHANGE UP

## 2023-01-09 PROCEDURE — 71275 CT ANGIOGRAPHY CHEST: CPT | Mod: 26,MA

## 2023-01-09 PROCEDURE — 93010 ELECTROCARDIOGRAM REPORT: CPT

## 2023-01-11 LAB
-  AMIKACIN: SIGNIFICANT CHANGE UP
-  AMPICILLIN: SIGNIFICANT CHANGE UP
-  AZTREONAM: SIGNIFICANT CHANGE UP
-  CEFEPIME: SIGNIFICANT CHANGE UP
-  CEFTAZIDIME/AVIBACTAM: SIGNIFICANT CHANGE UP
-  CEFTAZIDIME: SIGNIFICANT CHANGE UP
-  CEFTOLOZANE/TAZOBACTAM: SIGNIFICANT CHANGE UP
-  CIPROFLOXACIN: SIGNIFICANT CHANGE UP
-  CIPROFLOXACIN: SIGNIFICANT CHANGE UP
-  DAPTOMYCIN: SIGNIFICANT CHANGE UP
-  GENTAMICIN: SIGNIFICANT CHANGE UP
-  IMIPENEM: SIGNIFICANT CHANGE UP
-  LEVOFLOXACIN: SIGNIFICANT CHANGE UP
-  LEVOFLOXACIN: SIGNIFICANT CHANGE UP
-  LINEZOLID: SIGNIFICANT CHANGE UP
-  MEROPENEM: SIGNIFICANT CHANGE UP
-  NITROFURANTOIN: SIGNIFICANT CHANGE UP
-  PIPERACILLIN/TAZOBACTAM: SIGNIFICANT CHANGE UP
-  TETRACYCLINE: SIGNIFICANT CHANGE UP
-  TOBRAMYCIN: SIGNIFICANT CHANGE UP
-  VANCOMYCIN: SIGNIFICANT CHANGE UP
BLANDM BLD POS QL PROBE: SIGNIFICANT CHANGE UP
CULTURE RESULTS: SIGNIFICANT CHANGE UP
METHOD TYPE: SIGNIFICANT CHANGE UP
ORGANISM # SPEC MICROSCOPIC CNT: SIGNIFICANT CHANGE UP
SPECIMEN SOURCE: SIGNIFICANT CHANGE UP

## 2023-01-12 RX ORDER — CIPROFLOXACIN LACTATE 400MG/40ML
1 VIAL (ML) INTRAVENOUS
Qty: 14 | Refills: 0
Start: 2023-01-12 | End: 2023-01-18

## 2023-01-12 NOTE — ED POST DISCHARGE NOTE - RESULT SUMMARY
10,000-49,000 pseudomonas aeruginosa (carbapenem resistant); 10,000-49,000 enterecoccus faecium (vancomycin resistant)

## 2023-01-12 NOTE — ED POST DISCHARGE NOTE - DETAILS
Contacted RN at [facility] who reports pt is doing well, feels better since ED visit.  Also S/w PCP Dr. Nixon - Results provided. D/w provider pt to begin cipro 500 mg bid x 7 days.  Return to ED if pt feels unwell.

## 2023-01-14 LAB
CULTURE RESULTS: SIGNIFICANT CHANGE UP
CULTURE RESULTS: SIGNIFICANT CHANGE UP
SPECIMEN SOURCE: SIGNIFICANT CHANGE UP
SPECIMEN SOURCE: SIGNIFICANT CHANGE UP

## 2023-02-19 ENCOUNTER — INPATIENT (INPATIENT)
Facility: HOSPITAL | Age: 88
LOS: 7 days | Discharge: HOME | End: 2023-02-27
Attending: FAMILY MEDICINE | Admitting: FAMILY MEDICINE
Payer: MEDICARE

## 2023-02-19 VITALS
OXYGEN SATURATION: 99 % | TEMPERATURE: 98 F | SYSTOLIC BLOOD PRESSURE: 149 MMHG | DIASTOLIC BLOOD PRESSURE: 80 MMHG | RESPIRATION RATE: 10 BRPM | HEART RATE: 90 BPM | WEIGHT: 179.02 LBS | HEIGHT: 66.5 IN

## 2023-02-19 LAB
ALBUMIN SERPL ELPH-MCNC: 3.6 G/DL — SIGNIFICANT CHANGE UP (ref 3.3–5)
ALP SERPL-CCNC: 72 U/L — SIGNIFICANT CHANGE UP (ref 40–120)
ALT FLD-CCNC: 18 U/L — SIGNIFICANT CHANGE UP (ref 12–78)
ANION GAP SERPL CALC-SCNC: 5 MMOL/L — SIGNIFICANT CHANGE UP (ref 5–17)
AST SERPL-CCNC: 21 U/L — SIGNIFICANT CHANGE UP (ref 15–37)
BASOPHILS # BLD AUTO: 0.03 K/UL — SIGNIFICANT CHANGE UP (ref 0–0.2)
BASOPHILS NFR BLD AUTO: 0.7 % — SIGNIFICANT CHANGE UP (ref 0–2)
BILIRUB SERPL-MCNC: 0.4 MG/DL — SIGNIFICANT CHANGE UP (ref 0.2–1.2)
BUN SERPL-MCNC: 6 MG/DL — LOW (ref 7–23)
CALCIUM SERPL-MCNC: 8.7 MG/DL — SIGNIFICANT CHANGE UP (ref 8.5–10.1)
CHLORIDE SERPL-SCNC: 115 MMOL/L — HIGH (ref 96–108)
CO2 SERPL-SCNC: 28 MMOL/L — SIGNIFICANT CHANGE UP (ref 22–31)
CREAT SERPL-MCNC: 0.87 MG/DL — SIGNIFICANT CHANGE UP (ref 0.5–1.3)
EGFR: 63 ML/MIN/1.73M2 — SIGNIFICANT CHANGE UP
EOSINOPHIL # BLD AUTO: 0.08 K/UL — SIGNIFICANT CHANGE UP (ref 0–0.5)
EOSINOPHIL NFR BLD AUTO: 1.8 % — SIGNIFICANT CHANGE UP (ref 0–6)
GLUCOSE SERPL-MCNC: 77 MG/DL — SIGNIFICANT CHANGE UP (ref 70–99)
HCT VFR BLD CALC: 31 % — LOW (ref 34.5–45)
HGB BLD-MCNC: 9.8 G/DL — LOW (ref 11.5–15.5)
IMM GRANULOCYTES NFR BLD AUTO: 0.2 % — SIGNIFICANT CHANGE UP (ref 0–0.9)
LIDOCAIN IGE QN: 74 U/L — SIGNIFICANT CHANGE UP (ref 73–393)
LYMPHOCYTES # BLD AUTO: 1.61 K/UL — SIGNIFICANT CHANGE UP (ref 1–3.3)
LYMPHOCYTES # BLD AUTO: 35.9 % — SIGNIFICANT CHANGE UP (ref 13–44)
MAGNESIUM SERPL-MCNC: 2.3 MG/DL — SIGNIFICANT CHANGE UP (ref 1.6–2.6)
MCHC RBC-ENTMCNC: 25.7 PG — LOW (ref 27–34)
MCHC RBC-ENTMCNC: 31.6 G/DL — LOW (ref 32–36)
MCV RBC AUTO: 81.2 FL — SIGNIFICANT CHANGE UP (ref 80–100)
MONOCYTES # BLD AUTO: 0.62 K/UL — SIGNIFICANT CHANGE UP (ref 0–0.9)
MONOCYTES NFR BLD AUTO: 13.8 % — SIGNIFICANT CHANGE UP (ref 2–14)
NEUTROPHILS # BLD AUTO: 2.13 K/UL — SIGNIFICANT CHANGE UP (ref 1.8–7.4)
NEUTROPHILS NFR BLD AUTO: 47.6 % — SIGNIFICANT CHANGE UP (ref 43–77)
NRBC # BLD: 0 /100 WBCS — SIGNIFICANT CHANGE UP (ref 0–0)
NT-PROBNP SERPL-SCNC: 119 PG/ML — SIGNIFICANT CHANGE UP (ref 0–450)
PLATELET # BLD AUTO: 391 K/UL — SIGNIFICANT CHANGE UP (ref 150–400)
POTASSIUM SERPL-MCNC: 3.3 MMOL/L — LOW (ref 3.5–5.3)
POTASSIUM SERPL-SCNC: 3.3 MMOL/L — LOW (ref 3.5–5.3)
PROT SERPL-MCNC: 8 GM/DL — SIGNIFICANT CHANGE UP (ref 6–8.3)
RAPID RVP RESULT: SIGNIFICANT CHANGE UP
RBC # BLD: 3.82 M/UL — SIGNIFICANT CHANGE UP (ref 3.8–5.2)
RBC # FLD: 15.8 % — HIGH (ref 10.3–14.5)
SARS-COV-2 RNA SPEC QL NAA+PROBE: SIGNIFICANT CHANGE UP
SODIUM SERPL-SCNC: 148 MMOL/L — HIGH (ref 135–145)
TROPONIN I, HIGH SENSITIVITY RESULT: 31 NG/L — SIGNIFICANT CHANGE UP
WBC # BLD: 4.48 K/UL — SIGNIFICANT CHANGE UP (ref 3.8–10.5)
WBC # FLD AUTO: 4.48 K/UL — SIGNIFICANT CHANGE UP (ref 3.8–10.5)

## 2023-02-19 PROCEDURE — 99222 1ST HOSP IP/OBS MODERATE 55: CPT

## 2023-02-19 PROCEDURE — 93970 EXTREMITY STUDY: CPT | Mod: 26

## 2023-02-19 PROCEDURE — 71045 X-RAY EXAM CHEST 1 VIEW: CPT | Mod: 26

## 2023-02-19 PROCEDURE — 99285 EMERGENCY DEPT VISIT HI MDM: CPT

## 2023-02-19 RX ORDER — CILOSTAZOL 100 MG/1
50 TABLET ORAL
Refills: 0 | Status: DISCONTINUED | OUTPATIENT
Start: 2023-02-19 | End: 2023-02-19

## 2023-02-19 RX ORDER — ATORVASTATIN CALCIUM 80 MG/1
80 TABLET, FILM COATED ORAL AT BEDTIME
Refills: 0 | Status: DISCONTINUED | OUTPATIENT
Start: 2023-02-19 | End: 2023-02-21

## 2023-02-19 RX ORDER — ALLOPURINOL 300 MG
1 TABLET ORAL
Qty: 0 | Refills: 0 | DISCHARGE

## 2023-02-19 RX ORDER — POTASSIUM CHLORIDE 20 MEQ
40 PACKET (EA) ORAL ONCE
Refills: 0 | Status: COMPLETED | OUTPATIENT
Start: 2023-02-19 | End: 2023-02-19

## 2023-02-19 RX ORDER — ASPIRIN/CALCIUM CARB/MAGNESIUM 324 MG
81 TABLET ORAL DAILY
Refills: 0 | Status: DISCONTINUED | OUTPATIENT
Start: 2023-02-19 | End: 2023-02-27

## 2023-02-19 RX ORDER — CILOSTAZOL 100 MG/1
1 TABLET ORAL
Qty: 0 | Refills: 0 | DISCHARGE

## 2023-02-19 RX ORDER — HEPARIN SODIUM 5000 [USP'U]/ML
5000 INJECTION INTRAVENOUS; SUBCUTANEOUS EVERY 12 HOURS
Refills: 0 | Status: DISCONTINUED | OUTPATIENT
Start: 2023-02-19 | End: 2023-02-19

## 2023-02-19 RX ORDER — ACETAMINOPHEN 500 MG
650 TABLET ORAL
Qty: 0 | Refills: 0 | DISCHARGE

## 2023-02-19 RX ORDER — COLCHICINE 0.6 MG
0.6 TABLET ORAL DAILY
Refills: 0 | Status: DISCONTINUED | OUTPATIENT
Start: 2023-02-19 | End: 2023-02-27

## 2023-02-19 RX ORDER — GABAPENTIN 400 MG/1
1 CAPSULE ORAL
Qty: 0 | Refills: 0 | DISCHARGE

## 2023-02-19 RX ORDER — MORPHINE SULFATE 50 MG/1
4 CAPSULE, EXTENDED RELEASE ORAL ONCE
Refills: 0 | Status: DISCONTINUED | OUTPATIENT
Start: 2023-02-19 | End: 2023-02-19

## 2023-02-19 RX ORDER — CHOLESTYRAMINE 4 G/9G
1 POWDER, FOR SUSPENSION ORAL
Qty: 0 | Refills: 0 | DISCHARGE

## 2023-02-19 RX ORDER — SODIUM CHLORIDE 9 MG/ML
1000 INJECTION, SOLUTION INTRAVENOUS
Refills: 0 | Status: DISCONTINUED | OUTPATIENT
Start: 2023-02-19 | End: 2023-02-20

## 2023-02-19 RX ORDER — APIXABAN 2.5 MG/1
5 TABLET, FILM COATED ORAL EVERY 12 HOURS
Refills: 0 | Status: DISCONTINUED | OUTPATIENT
Start: 2023-02-19 | End: 2023-02-27

## 2023-02-19 RX ORDER — AMLODIPINE BESYLATE 2.5 MG/1
10 TABLET ORAL DAILY
Refills: 0 | Status: DISCONTINUED | OUTPATIENT
Start: 2023-02-19 | End: 2023-02-27

## 2023-02-19 RX ORDER — ACETAMINOPHEN 500 MG
1000 TABLET ORAL ONCE
Refills: 0 | Status: COMPLETED | OUTPATIENT
Start: 2023-02-19 | End: 2023-02-19

## 2023-02-19 RX ORDER — OXYCODONE HYDROCHLORIDE 5 MG/1
2.5 TABLET ORAL EVERY 8 HOURS
Refills: 0 | Status: DISCONTINUED | OUTPATIENT
Start: 2023-02-19 | End: 2023-02-20

## 2023-02-19 RX ADMIN — Medication 81 MILLIGRAM(S): at 12:53

## 2023-02-19 RX ADMIN — ATORVASTATIN CALCIUM 80 MILLIGRAM(S): 80 TABLET, FILM COATED ORAL at 21:43

## 2023-02-19 RX ADMIN — Medication 40 MILLIEQUIVALENT(S): at 12:53

## 2023-02-19 RX ADMIN — MORPHINE SULFATE 4 MILLIGRAM(S): 50 CAPSULE, EXTENDED RELEASE ORAL at 14:56

## 2023-02-19 RX ADMIN — MORPHINE SULFATE 4 MILLIGRAM(S): 50 CAPSULE, EXTENDED RELEASE ORAL at 13:36

## 2023-02-19 RX ADMIN — Medication 100 MILLIGRAM(S): at 18:24

## 2023-02-19 RX ADMIN — Medication 1000 MILLIGRAM(S): at 14:56

## 2023-02-19 RX ADMIN — Medication 400 MILLIGRAM(S): at 12:52

## 2023-02-19 RX ADMIN — SODIUM CHLORIDE 60 MILLILITER(S): 9 INJECTION, SOLUTION INTRAVENOUS at 18:23

## 2023-02-19 RX ADMIN — Medication 0.6 MILLIGRAM(S): at 18:22

## 2023-02-19 RX ADMIN — APIXABAN 5 MILLIGRAM(S): 2.5 TABLET, FILM COATED ORAL at 18:22

## 2023-02-19 NOTE — PHARMACOTHERAPY INTERVENTION NOTE - COMMENTS
Contacted patient's pharmacy and reviewed current medications. Patient is currently on:  - Atorvastatin 40 mG by mouth once a day (Last picked up 1/30, 90 day supply)  - Eliquis 5 mG by mouth twice a day (Last picked up 1/30, 30 day supply)   - Amlodipine 10 mG by mouth once a day  (Last picked up 1/30, 30 day supply)   - Cilostazol 50 mG by mouth twice a day  (Last picked up 1/30, 30 day supply)   - Pantoprazole 40 mG by mouth once a day  (Last picked up 1/30, 30 day supply)   - Allopurinol 100 mG by mouth once a day (Last picked up 11/30/22, 30 day supply)  - Gabapentin 300 mG by mouth three times a day (Last picked up 11/30/22, 30 day supply)  - Cholestyramine 4 g packet by mouth once a day (Last picked up 11/30/22, 30 day supply)  - Bisoprolol/hydrochlorothiazide 5 mg/6.25 mG by mouth once a day (Last picked up 09/2022, 90 day supply)    OMR has been updated.  Contacted patient's pharmacy and reviewed current medications. Patient is currently on:  - Atorvastatin 40 mG by mouth once a day (Last picked up 1/30, 90 day supply)  - Eliquis 5 mG by mouth twice a day (Last picked up 1/30, 30 day supply)   - Amlodipine 10 mG by mouth once a day  (Last picked up 1/30, 30 day supply)   - Cilostazol 50 mG by mouth twice a day  (Last picked up 1/30, 30 day supply)   - Pantoprazole 40 mG by mouth once a day  (Last picked up 1/30, 30 day supply)   - Allopurinol 100 mG by mouth once a day (Last picked up 11/30/22, 30 day supply)  - Gabapentin 300 mG by mouth three times a day (Last picked up 11/30/22, 30 day supply)  - Cholestyramine 4 g packet by mouth once a day (Last picked up 11/30/22, 30 day supply)  - Bisoprolol/hydrochlorothiazide 5 mg/6.25 mG by mouth once a day (Last picked up 09/2022, 90 day supply)    Patient also received the following medications while at [facility]   - Lyrica 100 mG by mouth twice a day (Last picked up 1/23, 14 day supply)   - Calcium 600 mG/ Vitamin D 400 units by mouth once a day (Last picked up 1/23, 14 day supply)   - Aspirin 81 mG by mouth once a day (Last picked up 1/23, 14 day supply)   - Miralax 17 g by mouth once a day PRN constipation (Last picked up 1/23, 14 day supply)   - Robitussin DM by mouth once a day PRN cough (Last picked up 1/23, 14 day supply)     OMR has been updated.  RDS (respiratory distress syndrome of )

## 2023-02-19 NOTE — ED PROVIDER NOTE - PHYSICAL EXAMINATION
VITAL SIGNS: I have reviewed nursing notes and confirm.  CONSTITUTIONAL: well-appearing, non-toxic,  SKIN: Warm dry, normal skin turgor  HEAD: NCAT  EYES: no scleral icterus  ENT: Moist mucous membranes, normal pharynx   NECK: Supple; non tender. Full ROM.   CARD: RRR, no murmurs, rubs or gallops  RESP: clear to ausculation b/l.  No rales, rhonchi, or wheezing.  ABD: soft, + BS, non-tender, non-distended,  NEURO: normal motor. normal sensory.   PSYCH: Cooperative, appropriate.

## 2023-02-19 NOTE — H&P ADULT - ASSESSMENT
89yo  F     with h/o CVA x3, HTN,        carpal tunnel syndrome, gout , fibromyalgia/  PE  1/2023           arrived   from home for generalized body pain  x 1 week.       admits to ?  chills, denies fever, vomiting diarrhea, no chest pain shortness of breath abdominal pain       rvp is pending      *  admitted  with  diffuse myalgias, which pt attributes  to  her  dx  of fibromyalgia     on Lyrica     rvp/ covid, is  pending   *  prior  CVA   89yo  F     with h/o CVA x3, HTN,        carpal tunnel syndrome, gout , fibromyalgia/  PE  1/2023           arrived   from home for generalized body pain  x 1 week.       admits to ?  chills, denies fever, vomiting diarrhea, no chest pain shortness of breath abdominal pain       rvp is pending      *  admitted  with  diffuse myalgias, which pt attributes  to  her  dx  of fibromyalgia         on Lyrica         rvp/ covid, is  pending   *  prior  CVA         on asa/ lipitor   *   HTN           on norvasc   *  Gout        Colchicine    dvt ppx/  s/q  heparin         rad< from: TTE Echo Complete w/o Contrast w/ Doppler (12.06.22 @ 12:57) >    Summary:   1. Left ventricular ejection fraction, by visual estimation, is 60 to   65%.   2. Technically difficult study.   3. Normal global left ventricular systolic function.   4. Mildly increased LV wall thickness.   5. Normal left ventricular internal cavity size.   6. Spectral Doppler shows impaired relaxation pattern of left   ventricular myocardial filling (Grade I diastolic dysfunction).   7. Normal right ventricular size and function.   8. Normal left atrial size.   9. Normal right atrial size.  10. There is no evidence of pericardial effusion.  11. Mild mitral valve regurgitation.  12. Mild thickening and calcification of the anterior and posterior   mitral valve leaflets.  13. Sclerotic aortic valve with decreased opening.  14. Consider echocontrast enhanced study to further characterized   endocardial border definition.  15. Increased relative wall thickness with normal mass index consistent   with left ventricular concentric remodeling.  16. Intact intra-atrial septum without shunt.  [id number] Leonardo Escobar MD FACC, FASE, FACP  < end of copied text >     rad< from: CT Angio Chest PE Protocol w/ IV Cont (01.01.23 @ 06:38) >  IMPRESSION:  Small segmental right upper lobe pulmonary embolism on the exam of   12/30/2022 is not visible on the current study. Tiny distal segmental   left upper lobe peripheral pulmonary embolism is seen (4:255). This is   less prominent when compared to the exam of 12/30/2022.  No right heart strain.  Findings were discussed with Dr. ROCKY SUN [phone] 1/1/2023 7:00 AM   by Dr. Lemus with read back confirmation.  --- End of Report ---             89yo  F     with h/o CVA x3, HTN,        carpal tunnel syndrome, gout , fibromyalgia/  PE  1/2023           arrived   from home for generalized body pain  x 1 week.       admits to ?  chills, denies fever, vomiting diarrhea, no chest pain shortness of breath abdominal pain       rvp is pending      *  admitted  with  diffuse myalgias, which pt attributes  to  her  dx  of fibromyalgia         on Lyrica         rvp/ covid, is  pending   *  prior  CVA         on asa/ lipitor   *   HTN           on norvasc   *  Gout        Colchicine      prior  RAD imaging  noted/    had  h/o PE  , noted on CT angio  chest in 12/30/22.   and on 1/1/2023  , and rpt ct angio on 1/9/23,  no pe      pulm to  weigh in. d r sidney    on   dvt ppx/  s/q  heparin         rad< from: TTE Echo Complete w/o Contrast w/ Doppler (12.06.22 @ 12:57) >    Summary:   1. Left ventricular ejection fraction, by visual estimation, is 60 to   65%.   2. Technically difficult study.   3. Normal global left ventricular systolic function.   4. Mildly increased LV wall thickness.   5. Normal left ventricular internal cavity size.   6. Spectral Doppler shows impaired relaxation pattern of left   ventricular myocardial filling (Grade I diastolic dysfunction).   7. Normal right ventricular size and function.   8. Normal left atrial size.   9. Normal right atrial size.  10. There is no evidence of pericardial effusion.  11. Mild mitral valve regurgitation.  12. Mild thickening and calcification of the anterior and posterior   mitral valve leaflets.  13. Sclerotic aortic valve with decreased opening.  14. Consider echocontrast enhanced study to further characterized   endocardial border definition.  15. Increased relative wall thickness with normal mass index consistent   with left ventricular concentric remodeling.  16. Intact intra-atrial septum without shunt.  [id number] Leonardo Escobar MD FACC, FASE, FACP  < end of copied text >     rad< from: CT Angio Chest PE Protocol w/ IV Cont (01.01.23 @ 06:38) >  IMPRESSION:  Small segmental right upper lobe pulmonary embolism on the exam of   12/30/2022 is not visible on the current study. Tiny distal segmental   left upper lobe peripheral pulmonary embolism is seen (4:255). This is   less prominent when compared to the exam of 12/30/2022.  No right heart strain.  Findings were discussed with Dr. ROCKY SUN [id number] 1/1/2023 7:00 AM   by Dr. Lemus with read back confirmation.  --- End of Report ---             89yo  F     with h/o CVA x3, HTN,        carpal tunnel syndrome, gout , fibromyalgia/  PE  1/2023           arrived   from home for generalized body pain  x 1 week.       admits to ?  chills, denies fever, vomiting diarrhea, no chest pain shortness of breath abdominal pain       rvp is pending      *  admitted  with  diffuse myalgias, which pt attributes  to  her  dx  of fibromyalgia         on Lyrica         rvp/ covid, is  pending   *  prior  CVA         on asa/ lipitor   *   HTN           on norvasc   *  c/c  anemia    *  Gout        Colchicine      prior  RAD imaging  noted/    had  h/o PE  , noted on CT angio  chest in 12/30/22.   and on 1/1/2023  , and rpt ct angio on 1/9/23,  no pe      pulm to  weigh in. d r sidney    on   dvt ppx/  s/q  heparin     my scheduled  shift ends  at 4  pm     rad< from: TTE Echo Complete w/o Contrast w/ Doppler (12.06.22 @ 12:57) >  Summary:   1. Left ventricular ejection fraction, by visual estimation, is 60 to   65%.   2. Technically difficult study.   3. Normal global left ventricular systolic function.   4. Mildly increased LV wall thickness.   5. Normal left ventricular internal cavity size.   6. Spectral Doppler shows impaired relaxation pattern of left   ventricular myocardial filling (Grade I diastolic dysfunction).   7. Normal right ventricular size and function.   8. Normal left atrial size.   9. Normal right atrial size.  10. There is no evidence of pericardial effusion.  11. Mild mitral valve regurgitation.  12. Mild thickening and calcification of the anterior and posterior   mitral valve leaflets.  13. Sclerotic aortic valve with decreased opening.  14. Consider echocontrast enhanced study to further characterized   endocardial border definition.  15. Increased relative wall thickness with normal mass index consistent   with left ventricular concentric remodeling.  16. Intact intra-atrial septum without shunt.  [id number] Leonardo Escobar MD FACC, FASE, FACP  < end of copied text >     rad< from: CT Angio Chest PE Protocol w/ IV Cont (01.01.23 @ 06:38) >  IMPRESSION:  Small segmental right upper lobe pulmonary embolism on the exam of   12/30/2022 is not visible on the current study. Tiny distal segmental   left upper lobe peripheral pulmonary embolism is seen (4:255). This is   less prominent when compared to the exam of 12/30/2022.  No right heart strain.  Findings were discussed with Dr. ROCKY SUN [id number] 1/1/2023 7:00 AM   by Dr. Lemus with read back confirmation.  --- End of Report ---             89yo  F       with h/o CVA x3, HTN,        carpal tunnel syndrome, gout , fibromyalgia/  PE  1/2023           arrived   from home for generalized body pain  x 1 week.       denies fever, vomiting diarrhea, no chest pain shortness of breath abdominal pain       rvp is   negative     *  admitted  with  diffuse myalgias, which pt attributes  to  her  dx  of fibromyalgia          archie  with b/l lower  leg pain/  venous dopplers  ordered         on Lyrica  and  tyleol prn, pain,  at home         rvp/ covid, is   negative   *  prior  CVA         on asa/ lipitor   *   HTN           on norvasc   *  c/c  anemia    *  Gout        Colchicine    *   prior  RAD imaging  noted/    had  h/o PE  , noted on CT angio  chest in 12/30/22.   and on 1/1/2023  , and rpt ct angio on 1/9/23,  no pe         on Eliquis bid, per  pt /       pulm   damian little       my scheduled  shift ends  at 4  pm     rad< from: TTE Echo Complete w/o Contrast w/ Doppler (12.06.22 @ 12:57) >  Summary:   1. Left ventricular ejection fraction, by visual estimation, is 60 to   65%.   2. Technically difficult study.   3. Normal global left ventricular systolic function.   4. Mildly increased LV wall thickness.   5. Normal left ventricular internal cavity size.   6. Spectral Doppler shows impaired relaxation pattern of left   ventricular myocardial filling (Grade I diastolic dysfunction).   7. Normal right ventricular size and function.   8. Normal left atrial size.   9. Normal right atrial size.  10. There is no evidence of pericardial effusion.  11. Mild mitral valve regurgitation.  12. Mild thickening and calcification of the anterior and posterior   mitral valve leaflets.  13. Sclerotic aortic valve with decreased opening.  14. Consider echocontrast enhanced study to further characterized   endocardial border definition.  15. Increased relative wall thickness with normal mass index consistent   with left ventricular concentric remodeling.  16. Intact intra-atrial septum without shunt.  [id number] Leonardo Escobar MD FACC, FASE, FACP  < end of copied text >     rad< from: CT Angio Chest PE Protocol w/ IV Cont (01.01.23 @ 06:38) >  IMPRESSION:  Small segmental right upper lobe pulmonary embolism on the exam of   12/30/2022 is not visible on the current study. Tiny distal segmental   left upper lobe peripheral pulmonary embolism is seen (4:255). This is   less prominent when compared to the exam of 12/30/2022.  No right heart strain.  Findings were discussed with Dr. ROCKY SUN [id number] 1/1/2023 7:00 AM   by Dr. Lemus with read back confirmation.  --- End of Report ---             89yo  F       with h/o CVA x3, HTN,        carpal tunnel syndrome, gout , fibromyalgia/  PE  1/2023           arrived   from home for generalized body pain  x 1 week.       denies fever, vomiting diarrhea, no chest pain shortness of breath abdominal pain       rvp is   negative     *  admitted  with  diffuse myalgias, which pt attributes  to  her  dx  of fibromyalgia          archie  with b/l lower  leg pain/  venous dopplers  ordered         on Lyrica  and  tyleol prn, pain,  at home,  which  is  not  helping  pe r pt          rvp/ covid, is   negative   *  prior  CVA         on asa/ lipitor   *   HTN           on norvasc   *  c/c  anemia    *  Gout        on colchicine    *    had  h/o PE,  noted on CT angio  chest in 12/30/22.   and on 1/1/2023  , and rpt ct angio on 1/9/23,  no pe         on Eliquis bid, per  pt          puleric little     my scheduled  shift ends  at 4  pm     rad< from: TTE Echo Complete w/o Contrast w/ Doppler (12.06.22 @ 12:57) >  Summary:   1. Left ventricular ejection fraction, by visual estimation, is 60 to   65%.   2. Technically difficult study.   3. Normal global left ventricular systolic function.   4. Mildly increased LV wall thickness.   5. Normal left ventricular internal cavity size.   6. Spectral Doppler shows impaired relaxation pattern of left   ventricular myocardial filling (Grade I diastolic dysfunction).   7. Normal right ventricular size and function.   8. Normal left atrial size.   9. Normal right atrial size.  10. There is no evidence of pericardial effusion.  11. Mild mitral valve regurgitation.  12. Mild thickening and calcification of the anterior and posterior   mitral valve leaflets.  13. Sclerotic aortic valve with decreased opening.  14. Consider echocontrast enhanced study to further characterized   endocardial border definition.  15. Increased relative wall thickness with normal mass index consistent   with left ventricular concentric remodeling.  16. Intact intra-atrial septum without shunt.  [id number] Leonardo Escobar MD FACC, FASE, FACP  < end of copied text >     rad< from: CT Angio Chest PE Protocol w/ IV Cont (01.01.23 @ 06:38) >  IMPRESSION:  Small segmental right upper lobe pulmonary embolism on the exam of   12/30/2022 is not visible on the current study. Tiny distal segmental   left upper lobe peripheral pulmonary embolism is seen (4:255). This is   less prominent when compared to the exam of 12/30/2022.  No right heart strain.  Findings were discussed with Dr. ROCKY SUN [phone] 1/1/2023 7:00 AM   by Dr. Lemus with read back confirmation.  --- End of Report ---             89yo  F       with h/o CVA x3, HTN,        carpal tunnel syndrome, gout , fibromyalgia/  PE  1/2023           arrived   from home for generalized body pain  x 1 week.       denies fever, vomiting diarrhea, no chest pain shortness of breath abdominal pain       rvp is   negative/  pt lives  by herself     *  admitted  with  diffuse myalgias, which pt attributes  to  her  dx  of fibromyalgia          archie  with b/l lower  leg pain/  venous dopplers  ordered         on Lyrica  and  tyleol prn, pain,  at home,  which  is  not  helping  pe r pt          rvp/ covid, is   negative   *  prior  CVA         on asa/ lipitor   *   HTN           on norvasc   *  c/c  anemia          Ct a/p, pending   *  Gout        on colchicine    *    had  h/o PE,  noted on CT angio  chest in 12/30/22.   and on 1/1/2023  , and rpt ct angio on 1/9/23,  no pe           on Eliquis bid, per  pt /    pulm   d r sidney     my scheduled  shift ends  at 4  pm     rad< from: TTE Echo Complete w/o Contrast w/ Doppler (12.06.22 @ 12:57) >  Summary:   1. Left ventricular ejection fraction, by visual estimation, is 60 to   65%.   2. Technically difficult study.   3. Normal global left ventricular systolic function.   4. Mildly increased LV wall thickness.   5. Normal left ventricular internal cavity size.   6. Spectral Doppler shows impaired relaxation pattern of left   ventricular myocardial filling (Grade I diastolic dysfunction).   7. Normal right ventricular size and function.   8. Normal left atrial size.   9. Normal right atrial size.  10. There is no evidence of pericardial effusion.  11. Mild mitral valve regurgitation.  12. Mild thickening and calcification of the anterior and posterior   mitral valve leaflets.  13. Sclerotic aortic valve with decreased opening.  14. Consider echocontrast enhanced study to further characterized   endocardial border definition.  15. Increased relative wall thickness with normal mass index consistent   with left ventricular concentric remodeling.  16. Intact intra-atrial septum without shunt.  [id number] Leonardo Escobar MD FACC, FASE, FACP  < end of copied text >     rad< from: CT Angio Chest PE Protocol w/ IV Cont (01.01.23 @ 06:38) >  IMPRESSION:  Small segmental right upper lobe pulmonary embolism on the exam of   12/30/2022 is not visible on the current study. Tiny distal segmental   left upper lobe peripheral pulmonary embolism is seen (4:255). This is   less prominent when compared to the exam of 12/30/2022.  No right heart strain.  Findings were discussed with Dr. ROCKY SUN [phone] 1/1/2023 7:00 AM   by Dr. Lemus with read back confirmation.  --- End of Report ---

## 2023-02-19 NOTE — ED ADULT NURSE NOTE - NSIMPLEMENTINTERV_GEN_ALL_ED
Implemented All Fall with Harm Risk Interventions:  [place] to call system. Call bell, personal items and telephone within reach. Instruct patient to call for assistance. Room bathroom lighting operational. Non-slip footwear when patient is off stretcher. Physically safe environment: no spills, clutter or unnecessary equipment. Stretcher in lowest position, wheels locked, appropriate side rails in place. Provide visual cue, wrist band, yellow gown, etc. Monitor gait and stability. Monitor for mental status changes and reorient to person, place, and time. Review medications for side effects contributing to fall risk. Reinforce activity limits and safety measures with patient and family. Provide visual clues: red socks.

## 2023-02-19 NOTE — CONSULT NOTE ADULT - SUBJECTIVE AND OBJECTIVE BOX
Patient is a 89y old  Female who presents with a chief complaint of myalgias (19 Feb 2023 12:22)    HPI:   89yo  F with h/o CVA x3, HTN, carpal tunnel syndrome, gout , fibromyalgia, h/o  PE  1/2023, on Eliquis bid, per  pt, ( but unsure as  to  why she takes  it ), chronic hypoxic Respiratory failure on home O2.  Came   from home for generalized body pain  x 1 week along with she could not bear her weight.  Admits to  chills which is not new but    denies fever, vomiting diarrhea, no chest pain.  Feels SOB  shortness of breath at times.      denies other complaints.      rvp is negative      pt lives  by herself (19 Feb 2023 12:22)    PAST MEDICAL & SURGICAL HISTORY:  Hypertension    Fibromyalgia    Osteoarthritis    Neuropathy    HTN (hypertension)    CVA (cerebral vascular accident)  left side weakness    No significant past surgical history    FAMILY HISTORY:  No pertinent family history in first degree relatives    SOCIAL HISTORY: BMI (kg/m2): 28.5 (02-19 @ 10:07). Remote smoking    Allergies  No Known Allergies    MEDICATIONS  (STANDING):  amLODIPine   Tablet 10 milliGRAM(s) Oral daily  apixaban 5 milliGRAM(s) Oral every 12 hours  aspirin enteric coated 81 milliGRAM(s) Oral daily  atorvastatin 80 milliGRAM(s) Oral at bedtime  colchicine 0.6 milliGRAM(s) Oral daily  dextrose 5% + sodium chloride 0.45%. 1000 milliLiter(s) (60 mL/Hr) IV Continuous <Continuous>  pregabalin 100 milliGRAM(s) Oral two times a day    MEDICATIONS  (PRN):  oxyCODONE    IR 2.5 milliGRAM(s) Oral every 8 hours PRN Moderate Pain (4 - 6)    REVIEW OF SYSTEMS: Not able to provide details.    Vital Signs Last 24 Hrs  T(C): 36.3 (19 Feb 2023 16:39), Max: 36.8 (19 Feb 2023 13:09)  T(F): 97.3 (19 Feb 2023 16:39), Max: 98.3 (19 Feb 2023 13:09)  HR: 82 (19 Feb 2023 16:39) (82 - 93)  BP: 138/75 (19 Feb 2023 16:39) (122/80 - 149/80)  BP(mean): --  RR: 18 (19 Feb 2023 16:39) (10 - 18)  SpO2: 96% (19 Feb 2023 16:39) (96% - 99%)    Parameters below as of 19 Feb 2023 16:39  Patient On (Oxygen Delivery Method): nasal cannula  O2 Flow (L/min): 3    PHYSICAL EXAM:  GEN:         Awake, responsive and comfortable.  HEENT:    Normal.    RESP:       no wheezing  CVS:             Regular rate and rhythm.   ABD:         Soft, non-tender, non-distended;   SKIN:           Warm and dry.  EXTR:            No clubbing, cyanosis or edema  CNS:              Intact sensory and motor function.  PSYCH:        cooperative, no anxiety or depression    LABS:                        9.8    4.48  )-----------( 391      ( 19 Feb 2023 11:40 )             31.0     02-19    148<H>  |  115<H>  |  6<L>  ----------------------------<  77  3.3<L>   |  28  |  0.87    Ca    8.7      19 Feb 2023 11:40  Mg     2.3     02-19    TPro  8.0  /  Alb  3.6  /  TBili  0.4  /  DBili  x   /  AST  21  /  ALT  18  /  AlkPhos  72  02-19    RADIOLOGY & ADDITIONAL STUDIES:  < from: US Duplex Venous Lower Ext Complete, Bilateral (02.19.23 @ 15:03) >  ACC: [id number] EXAM:  US DPLX LWR EXT VEINS COMPL BI   ORDERED BY: LEDA GARCIA     PROCEDURE DATE:  02/19/2023          INTERPRETATION:  CLINICAL INFORMATION: Bilateral leg pain    COMPARISON: None available.    TECHNIQUE: Duplex sonography of the BILATERAL LOWER extremity veins with   color and spectral Doppler, with and without compression.    FINDINGS:    RIGHT:  Normal compressibility of the RIGHT common femoral, femoral and popliteal   veins.  Doppler examination shows normal spontaneous and phasic flow.  No RIGHT calf vein thrombosis is detected.    LEFT:  Normal compressibility of the LEFT common femoral, femoral and popliteal   veins.  Doppler examination shows normal spontaneous and phasic flow.  No LEFT calf vein thrombosis isdetected.    IMPRESSION:  No evidence of deep venous thrombosis in either lower extremity.    --- End of Report ---    JOSE M ALEMAN MD; Attending Radiologist  This document has been electronically signed. Feb 19 2023  3:08PM  < from: Xray Chest 1 View- PORTABLE-Urgent (02.19.23 @ 12:06) >  ACC: [id number] EXAM:  XR CHEST PORTABLE URGENT 1V   ORDERED BY: SOPHIA KIM     PROCEDURE DATE:  02/19/2023      INTERPRETATION:  AP chest on February 19, 2023 11:37 AM. Patient has   generalized pain.    Heart magnified by technique.    No definite infiltration.    Shoulder degeneration left greater than right noted.    Chest is similar to January 8 this year.    IMPRESSION: No acute finding or change.    SAEID BARRERA MD; Attending Radiologist  This document has been electronically signed. Feb 19 2023  3:55PM    ASSESSMENT AND PLAN:  ·	SOB.  ·	Chronic hypoxic Respiratory failure.  ·	Chronic pain syndrome.  ·	Anemia.  ·	Hypernatremia.  ·	Hypokalemia.  ·	HTN.  ·	Gout.  ·	Fibromyalgia.  ·	DVT hx.      SPO2 97% on nasal O2.  Continue Apixaban.  Pain control.  IV hydration.  PT evaluation.  Consider psych evaluation.

## 2023-02-19 NOTE — ED ADULT NURSE NOTE - OBJECTIVE STATEMENT
as per pt, "I am having pain all over my legs and it hurts when I try to walk and now when I am laying here", pt legs are warm, dry and clean with no discoloration, pt does not c/o pain on palpation

## 2023-02-19 NOTE — ED ADULT NURSE NOTE - NS ED NURSE LEVEL OF CONSCIOUSNESS ORIENTATION
Oriented - self; Oriented - place; Oriented - time The patient is a 49y Male complaining of multiple medical complaints.

## 2023-02-19 NOTE — H&P ADULT - HISTORY OF PRESENT ILLNESS
89yo  F     with h/o CVA x3, HTN,        carpal tunnel syndrome, gout , fibromyalgia/  PE  1/2023           arrived   from home for generalized body pain  x 1 week.      Patient admits to chills, denies fever, vomiting diarrhea, no chest pain shortness of breath abdominal pain     denies other complaints.     rvp is pending   89yo  F     with h/o CVA x3, HTN,      carpal tunnel syndrome, gout , fibromyalgia/  PE  1/2023          arrived   from home for generalized body pain  x 1 week.      pt  admits to ?  chills, denies fever, vomiting diarrhea, no chest pain shortness of breath abdominal pain     denies other complaints.      rvp is pending   89yo  F     with h/o CVA x3, HTN,      carpal tunnel syndrome, gout , fibromyalgia      h/o  PE  1/2023, on eliquis bid, per  pt          arrived   from home for generalized body pain  x 1 week.      pt  admits to ?  chills,/   denies fever, vomiting diarrhea, no chest pain shortness of breath abdominal pain     denies other complaints.      rvp is negative   pt lives  by herself  89yo  F     with h/o CVA x3, HTN,      carpal tunnel syndrome, gout , fibromyalgia      h/o  PE  1/2023, on eliquis bid, per  pt,  but unsure as  to  why she takes  it          arrived   from home for generalized body pain  x 1 week.      pt  admits to ?  chills,/   denies fever, vomiting diarrhea, no chest pain shortness of breath abdominal pain     denies other complaints.      rvp is negative      pt lives  by herself

## 2023-02-19 NOTE — ED ADULT TRIAGE NOTE - CHIEF COMPLAINT QUOTE
patient BIBA c/o of general body pain , chills denied fever no cough , patient on Oxygen at home 4 L NC denied chest pain, patient moving all extremities no facial droop clear speech at this time

## 2023-02-19 NOTE — ED PROVIDER NOTE - OBJECTIVE STATEMENT
Orientation to room/Bed in low position, brakes on/Side rails x 2 or 4 up, assess large gaps, such that a patient could get extremity or other body part entrapped, use additional safety procedures 89yo, BF with h/o CVA x3, HTN, carpal tunnel syndrome, gout , fibromyalgia BIBEMS from home for generalized body pain  x 1 week. Patient admits to chills, denies fever, vomiting diarrhea, no chest pain shortness of breath abdominal pain. denies other complaints.

## 2023-02-19 NOTE — PATIENT PROFILE ADULT - FALL HARM RISK - HARM RISK INTERVENTIONS
Assistance with ambulation/Assistance OOB with selected safe patient handling equipment/Communicate Risk of Fall with Harm to all staff/Discuss with provider need for PT consult/Monitor gait and stability/Reinforce activity limits and safety measures with patient and family/Tailored Fall Risk Interventions/Visual Cue: Yellow wristband and red socks/Bed in lowest position, wheels locked, appropriate side rails in place/Call bell, personal items and telephone in reach/Instruct patient to call for assistance before getting out of bed or chair/Non-slip footwear when patient is out of bed/[place] to call system/Physically safe environment - no spills, clutter or unnecessary equipment/Purposeful Proactive Rounding/Room/bathroom lighting operational, light cord in reach

## 2023-02-19 NOTE — H&P ADULT - NSHPLABSRESULTS_GEN_ALL_CORE
LABS:                        9.8    4.48  )-----------( 391      ( 19 Feb 2023 11:40 )             31.0     02-19    148<H>  |  115<H>  |  6<L>  ----------------------------<  77  3.3<L>   |  28  |  0.87    Ca    8.7      19 Feb 2023 11:40  Mg     2.3     02-19    TPro  8.0  /  Alb  3.6  /  TBili  0.4  /  DBili  x   /  AST  21  /  ALT  18  /  AlkPhos  72  02-19

## 2023-02-19 NOTE — H&P ADULT - NSHPPHYSICALEXAM_GEN_ALL_CORE
T(C): 36.7 (02-19-23 @ 10:07), Max: 36.7 (02-19-23 @ 10:07)  HR: 90 (02-19-23 @ 10:07) (90 - 90)  BP: 149/80 (02-19-23 @ 10:07) (149/80 - 149/80)  RR: 10 (02-19-23 @ 10:07) (10 - 10)  SpO2: 99% (02-19-23 @ 10:07) (99% - 99%)  GENERAL: NAD, lying in bed comfortably  HEAD:  Atraumatic, normocephalic  EYES: EOMI, PERRLA, conjunctiva and sclera clear  NECK: Supple, trachea midline, no JVD  HEART: Regular rate and rhythm, no murmurs, rubs, or gallops  LUNGS: Unlabored respirations.  Clear to auscultation bilaterally, no crackles, wheezing, or rhonchi  ABDOMEN: Soft, nontender, nondistended, +BS  EXTREMITIES: 2+ peripheral pulses bilaterally. No clubbing, cyanosis, or edema  NERVOUS SYSTEM:  A&Ox3, moving all extremities, no focal deficits   SKIN: No rashes or lesions

## 2023-02-20 LAB
ANION GAP SERPL CALC-SCNC: 5 MMOL/L — SIGNIFICANT CHANGE UP (ref 5–17)
BUN SERPL-MCNC: 6 MG/DL — LOW (ref 7–23)
CALCIUM SERPL-MCNC: 8.9 MG/DL — SIGNIFICANT CHANGE UP (ref 8.5–10.1)
CHLORIDE SERPL-SCNC: 113 MMOL/L — HIGH (ref 96–108)
CO2 SERPL-SCNC: 27 MMOL/L — SIGNIFICANT CHANGE UP (ref 22–31)
CREAT SERPL-MCNC: 0.77 MG/DL — SIGNIFICANT CHANGE UP (ref 0.5–1.3)
EGFR: 73 ML/MIN/1.73M2 — SIGNIFICANT CHANGE UP
GLUCOSE SERPL-MCNC: 70 MG/DL — SIGNIFICANT CHANGE UP (ref 70–99)
POTASSIUM SERPL-MCNC: 3.8 MMOL/L — SIGNIFICANT CHANGE UP (ref 3.5–5.3)
POTASSIUM SERPL-SCNC: 3.8 MMOL/L — SIGNIFICANT CHANGE UP (ref 3.5–5.3)
SODIUM SERPL-SCNC: 145 MMOL/L — SIGNIFICANT CHANGE UP (ref 135–145)

## 2023-02-20 PROCEDURE — 99232 SBSQ HOSP IP/OBS MODERATE 35: CPT

## 2023-02-20 RX ORDER — TRAMADOL HYDROCHLORIDE 50 MG/1
50 TABLET ORAL EVERY 6 HOURS
Refills: 0 | Status: DISCONTINUED | OUTPATIENT
Start: 2023-02-20 | End: 2023-02-26

## 2023-02-20 RX ADMIN — Medication 100 MILLIGRAM(S): at 17:29

## 2023-02-20 RX ADMIN — APIXABAN 5 MILLIGRAM(S): 2.5 TABLET, FILM COATED ORAL at 17:29

## 2023-02-20 RX ADMIN — Medication 0.6 MILLIGRAM(S): at 11:24

## 2023-02-20 RX ADMIN — APIXABAN 5 MILLIGRAM(S): 2.5 TABLET, FILM COATED ORAL at 05:18

## 2023-02-20 RX ADMIN — Medication 100 MILLIGRAM(S): at 05:18

## 2023-02-20 RX ADMIN — Medication 81 MILLIGRAM(S): at 11:24

## 2023-02-20 RX ADMIN — TRAMADOL HYDROCHLORIDE 50 MILLIGRAM(S): 50 TABLET ORAL at 17:00

## 2023-02-20 RX ADMIN — SODIUM CHLORIDE 60 MILLILITER(S): 9 INJECTION, SOLUTION INTRAVENOUS at 11:24

## 2023-02-20 RX ADMIN — OXYCODONE HYDROCHLORIDE 2.5 MILLIGRAM(S): 5 TABLET ORAL at 08:32

## 2023-02-20 RX ADMIN — OXYCODONE HYDROCHLORIDE 2.5 MILLIGRAM(S): 5 TABLET ORAL at 09:00

## 2023-02-20 RX ADMIN — ATORVASTATIN CALCIUM 80 MILLIGRAM(S): 80 TABLET, FILM COATED ORAL at 21:13

## 2023-02-20 RX ADMIN — TRAMADOL HYDROCHLORIDE 50 MILLIGRAM(S): 50 TABLET ORAL at 16:19

## 2023-02-20 RX ADMIN — AMLODIPINE BESYLATE 10 MILLIGRAM(S): 2.5 TABLET ORAL at 05:18

## 2023-02-20 NOTE — PROGRESS NOTE ADULT - ASSESSMENT
89yo  F       with h/o CVA x3, HTN,        carpal tunnel syndrome, gout , fibromyalgia/  PE  1/2023           arrived   from home for generalized body pain  x 1 week.       denies fever, vomiting diarrhea, no chest pain shortness of breath abdominal pain       rvp is   negative/  pt lives  by herself     *  admitted  with  diffuse myalgias, which pt attributes  to  her  dx  of fibromyalgia          archie  with b/l lower  leg pain/  venous dopplers  ordered         on Lyrica  and  tyleol prn, pain,  at home,  which  is  not  helping  pe r pt          rvp/ covid, is   negative   *  prior  CVA         on asa/ lipitor   *   HTN           on norvasc   *  c/c  anemia          Ct a/p, pending   *  Gout        on colchicine    *    had  h/o PE,  noted on CT angio  chest in 12/30/22.   and on 1/1/2023  , and rpt ct angio on 1/9/23,  no pe           on Eliquis bid, per  pt /    pulm   d r sidney     my scheduled  shift ends  at 4  pm     rad< from: TTE Echo Complete w/o Contrast w/ Doppler (12.06.22 @ 12:57) >  Summary:   1. Left ventricular ejection fraction, by visual estimation, is 60 to   65%.   2. Technically difficult study.   3. Normal global left ventricular systolic function.   4. Mildly increased LV wall thickness.   5. Normal left ventricular internal cavity size.   6. Spectral Doppler shows impaired relaxation pattern of left   ventricular myocardial filling (Grade I diastolic dysfunction).   7. Normal right ventricular size and function.   8. Normal left atrial size.   9. Normal right atrial size.  10. There is no evidence of pericardial effusion.  11. Mild mitral valve regurgitation.  12. Mild thickening and calcification of the anterior and posterior   mitral valve leaflets.  13. Sclerotic aortic valve with decreased opening.  14. Consider echocontrast enhanced study to further characterized   endocardial border definition.  15. Increased relative wall thickness with normal mass index consistent   with left ventricular concentric remodeling.  16. Intact intra-atrial septum without shunt.  [id number] Leonardo Escobar MD FACC, FASE, FACP  < end of copied text >     rad< from: CT Angio Chest PE Protocol w/ IV Cont (01.01.23 @ 06:38) >  IMPRESSION:  Small segmental right upper lobe pulmonary embolism on the exam of   12/30/2022 is not visible on the current study. Tiny distal segmental   left upper lobe peripheral pulmonary embolism is seen (4:255). This is   less prominent when compared to the exam of 12/30/2022.  No right heart strain.  Findings were discussed with Dr. ROCKY SUN [phone] 1/1/2023 7:00 AM   by Dr. Lemus with read back confirmation.  --- End of Report ---             89 yo female PMH CVA x 3, HTN, carpal tunnel syndrome, gout , fibromyalgia/  PE  1/2023        Arrived   from home for generalized body pain  x 1 week.  RVP negative.     Admitted  with  diffuse myalgias, which pt attributes  to  her  dx  of fibromyalgia, archie  with b/l lower  leg pain. LE venous dopplers negative.     On Lyrica  and  tyleol prn, pain,  at home,  which  is  not  helping  per patient.      Covid, is   negative,     Prior CVA continue asa, lipitor and Eliquis.     HTN: on Norvasc    Anemia, CT abd and pelvis ordred on admission.

## 2023-02-20 NOTE — PROGRESS NOTE ADULT - SUBJECTIVE AND OBJECTIVE BOX
INTERVAL HPI:  89yo  F with h/o CVA x3, HTN, carpal tunnel syndrome, gout , fibromyalgia, h/o  PE  1/2023, on Eliquis bid, per  pt, ( but unsure as  to  why she takes  it ), chronic hypoxic Respiratory failure on home O2.  Came   from home for generalized body pain  x 1 week along with she could not bear her weight.  Admits to  chills which is not new but    denies fever, vomiting diarrhea, no chest pain.  Feels SOB  shortness of breath at times.      denies other complaints.      rvp is negative      pt lives  by herself (19 Feb 2023 12:22)    OVERNIGHT EVENTS:  Awake responsive and comfortable.    Vital Signs Last 24 Hrs  T(C): 37.1 (20 Feb 2023 16:38), Max: 37.1 (20 Feb 2023 11:55)  T(F): 98.7 (20 Feb 2023 16:38), Max: 98.7 (20 Feb 2023 11:55)  HR: 71 (20 Feb 2023 16:38) (66 - 82)  BP: 124/67 (20 Feb 2023 16:38) (124/67 - 137/73)  BP(mean): --  RR: 18 (20 Feb 2023 16:38) (17 - 18)  SpO2: 95% (20 Feb 2023 16:38) (95% - 96%)    Parameters below as of 19 Feb 2023 23:15  Patient On (Oxygen Delivery Method): mask, nonrebreather  O2 Flow (L/min): 3    PHYSICAL EXAM:  GEN:         Awake, responsive and comfortable.  HEENT:    Normal.    RESP:        no wheezing.  CVS:         Regular rate and rhythm.     MEDICATIONS  (STANDING):  amLODIPine   Tablet 10 milliGRAM(s) Oral daily  apixaban 5 milliGRAM(s) Oral every 12 hours  aspirin enteric coated 81 milliGRAM(s) Oral daily  atorvastatin 80 milliGRAM(s) Oral at bedtime  colchicine 0.6 milliGRAM(s) Oral daily  pregabalin 100 milliGRAM(s) Oral two times a day    MEDICATIONS  (PRN):  traMADol 50 milliGRAM(s) Oral every 6 hours PRN Mild Pain (1 - 3)    LABS:                        9.8    4.48  )-----------( 391      ( 19 Feb 2023 11:40 )             31.0     02-20    145  |  113<H>  |  6<L>  ----------------------------<  70  3.8   |  27  |  0.77    Ca    8.9      20 Feb 2023 06:05  Mg     2.3     02-19    TPro  8.0  /  Alb  3.6  /  TBili  0.4  /  DBili  x   /  AST  21  /  ALT  18  /  AlkPhos  72  02-19    ASSESSMENT AND PLAN:  ·	SOB.  ·	Chronic hypoxic Respiratory failure.  ·	Chronic pain syndrome.  ·	Anemia.  ·	Hypernatremia.  ·	Hypokalemia.  ·	HTN.  ·	Gout.  ·	Fibromyalgia.  ·	DVT hx.      SPO2 97% on nasal O2.  Continue Apixaban.  Pain control.  IV hydration.  PT evaluation.  Consider psych evaluation.    02/20/23:  Feeling better, SPO2 95%.  Continue treatment.

## 2023-02-20 NOTE — PROGRESS NOTE ADULT - SUBJECTIVE AND OBJECTIVE BOX
INTERVAL HPI/OVERNIGHT EVENTS:  Pt seen and examined at bedside.     Allergies/Intolerance: No Known Allergies      MEDICATIONS  (STANDING):  amLODIPine   Tablet 10 milliGRAM(s) Oral daily  apixaban 5 milliGRAM(s) Oral every 12 hours  aspirin enteric coated 81 milliGRAM(s) Oral daily  atorvastatin 80 milliGRAM(s) Oral at bedtime  colchicine 0.6 milliGRAM(s) Oral daily  dextrose 5% + sodium chloride 0.45%. 1000 milliLiter(s) (60 mL/Hr) IV Continuous <Continuous>  pregabalin 100 milliGRAM(s) Oral two times a day    MEDICATIONS  (PRN):  oxyCODONE    IR 2.5 milliGRAM(s) Oral every 8 hours PRN Moderate Pain (4 - 6)        ROS: all systems reviewed and wnl      PHYSICAL EXAMINATION:  Vital Signs Last 24 Hrs  T(C): 36.6 (20 Feb 2023 05:06), Max: 36.8 (19 Feb 2023 13:09)  T(F): 97.8 (20 Feb 2023 05:06), Max: 98.3 (19 Feb 2023 13:09)  HR: 66 (20 Feb 2023 05:06) (66 - 93)  BP: 137/73 (20 Feb 2023 05:06) (122/80 - 138/75)  BP(mean): --  RR: 17 (20 Feb 2023 05:06) (17 - 18)  SpO2: 96% (20 Feb 2023 05:06) (96% - 97%)    Parameters below as of 19 Feb 2023 23:15  Patient On (Oxygen Delivery Method): mask, nonrebreather  O2 Flow (L/min): 3    CAPILLARY BLOOD GLUCOSE            GENERAL:   NECK: supple, No JVD  CHEST/LUNG: clear to auscultation bilaterally; no rales, rhonchi, or wheezing b/l  HEART: normal S1, S2  ABDOMEN: BS+, soft, ND, NT   EXTREMITIES:  pulses palpable; no clubbing, cyanosis, or edema b/l LEs  SKIN: no rashes or lesions      LABS:                        9.8    4.48  )-----------( 391      ( 19 Feb 2023 11:40 )             31.0     02-20    145  |  113<H>  |  6<L>  ----------------------------<  70  3.8   |  27  |  0.77    Ca    8.9      20 Feb 2023 06:05  Mg     2.3     02-19    TPro  8.0  /  Alb  3.6  /  TBili  0.4  /  DBili  x   /  AST  21  /  ALT  18  /  AlkPhos  72  02-19               INTERVAL HPI/OVERNIGHT EVENTS:  Pt seen and examined at bedside.     Allergies/Intolerance: No Known Allergies      MEDICATIONS  (STANDING):  amLODIPine   Tablet 10 milliGRAM(s) Oral daily  apixaban 5 milliGRAM(s) Oral every 12 hours  aspirin enteric coated 81 milliGRAM(s) Oral daily  atorvastatin 80 milliGRAM(s) Oral at bedtime  colchicine 0.6 milliGRAM(s) Oral daily  dextrose 5% + sodium chloride 0.45%. 1000 milliLiter(s) (60 mL/Hr) IV Continuous <Continuous>  pregabalin 100 milliGRAM(s) Oral two times a day    MEDICATIONS  (PRN):  oxyCODONE    IR 2.5 milliGRAM(s) Oral every 8 hours PRN Moderate Pain (4 - 6)        ROS: all systems reviewed and wnl      PHYSICAL EXAMINATION:  Vital Signs Last 24 Hrs  T(C): 36.6 (20 Feb 2023 05:06), Max: 36.8 (19 Feb 2023 13:09)  T(F): 97.8 (20 Feb 2023 05:06), Max: 98.3 (19 Feb 2023 13:09)  HR: 66 (20 Feb 2023 05:06) (66 - 93)  BP: 137/73 (20 Feb 2023 05:06) (122/80 - 138/75)  BP(mean): --  RR: 17 (20 Feb 2023 05:06) (17 - 18)  SpO2: 96% (20 Feb 2023 05:06) (96% - 97%)    Parameters below as of 19 Feb 2023 23:15  Patient On (Oxygen Delivery Method): mask, nonrebreather  O2 Flow (L/min): 3    CAPILLARY BLOOD GLUCOSE            GENERAL: stable on NC, no fevers or CP   NECK: supple, No JVD  CHEST/LUNG: clear to auscultation bilaterally; no rales, rhonchi, or wheezing b/l  HEART: normal S1, S2  ABDOMEN: BS+, soft, ND, NT   EXTREMITIES:  pulses palpable; no clubbing, cyanosis, or edema b/l LEs      LABS:                        9.8    4.48  )-----------( 391      ( 19 Feb 2023 11:40 )             31.0     02-20    145  |  113<H>  |  6<L>  ----------------------------<  70  3.8   |  27  |  0.77    Ca    8.9      20 Feb 2023 06:05  Mg     2.3     02-19    TPro  8.0  /  Alb  3.6  /  TBili  0.4  /  DBili  x   /  AST  21  /  ALT  18  /  AlkPhos  72  02-19

## 2023-02-21 LAB
HCT VFR BLD CALC: 32.2 % — LOW (ref 34.5–45)
HGB BLD-MCNC: 9.7 G/DL — LOW (ref 11.5–15.5)
MCHC RBC-ENTMCNC: 25.4 PG — LOW (ref 27–34)
MCHC RBC-ENTMCNC: 30.1 G/DL — LOW (ref 32–36)
MCV RBC AUTO: 84.3 FL — SIGNIFICANT CHANGE UP (ref 80–100)
NRBC # BLD: 0 /100 WBCS — SIGNIFICANT CHANGE UP (ref 0–0)
PLATELET # BLD AUTO: 403 K/UL — HIGH (ref 150–400)
RBC # BLD: 3.82 M/UL — SIGNIFICANT CHANGE UP (ref 3.8–5.2)
RBC # FLD: 15.6 % — HIGH (ref 10.3–14.5)
WBC # BLD: 5 K/UL — SIGNIFICANT CHANGE UP (ref 3.8–10.5)
WBC # FLD AUTO: 5 K/UL — SIGNIFICANT CHANGE UP (ref 3.8–10.5)

## 2023-02-21 PROCEDURE — 74174 CTA ABD&PLVS W/CONTRAST: CPT | Mod: 26

## 2023-02-21 PROCEDURE — 99232 SBSQ HOSP IP/OBS MODERATE 35: CPT

## 2023-02-21 RX ORDER — IOHEXOL 300 MG/ML
30 INJECTION, SOLUTION INTRAVENOUS ONCE
Refills: 0 | Status: DISCONTINUED | OUTPATIENT
Start: 2023-02-21 | End: 2023-02-21

## 2023-02-21 RX ORDER — CYCLOBENZAPRINE HYDROCHLORIDE 10 MG/1
5 TABLET, FILM COATED ORAL
Refills: 0 | Status: DISCONTINUED | OUTPATIENT
Start: 2023-02-21 | End: 2023-02-26

## 2023-02-21 RX ORDER — CYCLOBENZAPRINE HYDROCHLORIDE 10 MG/1
5 TABLET, FILM COATED ORAL THREE TIMES A DAY
Refills: 0 | Status: DISCONTINUED | OUTPATIENT
Start: 2023-02-21 | End: 2023-02-21

## 2023-02-21 RX ORDER — POLYETHYLENE GLYCOL 3350 17 G/17G
17 POWDER, FOR SOLUTION ORAL DAILY
Refills: 0 | Status: DISCONTINUED | OUTPATIENT
Start: 2023-02-21 | End: 2023-02-27

## 2023-02-21 RX ORDER — IPRATROPIUM/ALBUTEROL SULFATE 18-103MCG
3 AEROSOL WITH ADAPTER (GRAM) INHALATION EVERY 6 HOURS
Refills: 0 | Status: DISCONTINUED | OUTPATIENT
Start: 2023-02-21 | End: 2023-02-27

## 2023-02-21 RX ORDER — RADIOPAQUE PVC MARKERS/BARIUM 24MARKERS
450 CAPSULE ORAL ONCE
Refills: 0 | Status: COMPLETED | OUTPATIENT
Start: 2023-02-21 | End: 2023-02-21

## 2023-02-21 RX ADMIN — APIXABAN 5 MILLIGRAM(S): 2.5 TABLET, FILM COATED ORAL at 17:31

## 2023-02-21 RX ADMIN — Medication 0.6 MILLIGRAM(S): at 12:44

## 2023-02-21 RX ADMIN — APIXABAN 5 MILLIGRAM(S): 2.5 TABLET, FILM COATED ORAL at 05:13

## 2023-02-21 RX ADMIN — TRAMADOL HYDROCHLORIDE 50 MILLIGRAM(S): 50 TABLET ORAL at 17:22

## 2023-02-21 RX ADMIN — Medication 100 MILLIGRAM(S): at 05:13

## 2023-02-21 RX ADMIN — Medication 100 MILLIGRAM(S): at 17:30

## 2023-02-21 RX ADMIN — AMLODIPINE BESYLATE 10 MILLIGRAM(S): 2.5 TABLET ORAL at 05:13

## 2023-02-21 RX ADMIN — TRAMADOL HYDROCHLORIDE 50 MILLIGRAM(S): 50 TABLET ORAL at 16:40

## 2023-02-21 RX ADMIN — Medication 81 MILLIGRAM(S): at 12:44

## 2023-02-21 NOTE — PHYSICAL THERAPY INITIAL EVALUATION ADULT - PERTINENT HX OF CURRENT PROBLEM, REHAB EVAL
Pt is admitted with dx myalgia, c/o body and extremities  aches and pains, generalized weakness, difficulty in ambulation and transfers.

## 2023-02-21 NOTE — PHYSICAL THERAPY INITIAL EVALUATION ADULT - DIAGNOSIS, PT EVAL
Pt presented with strength and endurance deficits, difficulty in bed mobility, transfers, unsteady gait.

## 2023-02-21 NOTE — PROGRESS NOTE ADULT - SUBJECTIVE AND OBJECTIVE BOX
INTERVAL HPI:    89yo  F with h/o CVA x3, HTN, carpal tunnel syndrome, gout , fibromyalgia, h/o  PE  1/2023, on Eliquis bid, per  pt, ( but unsure as  to  why she takes  it ), chronic hypoxic Respiratory failure on home O2.  Came   from home for generalized body pain  x 1 week along with she could not bear her weight.  Admits to  chills which is not new but    denies fever, vomiting diarrhea, no chest pain.  Feels SOB  shortness of breath at times.      denies other complaints.      rvp is negative      pt lives  by herself (19 Feb 2023 12:22)    OVERNIGHT EVENTS:  Reports sob at times.    Vital Signs Last 24 Hrs  T(C): 36.8 (21 Feb 2023 17:49), Max: 37.1 (21 Feb 2023 11:00)  T(F): 98.2 (21 Feb 2023 17:49), Max: 98.8 (21 Feb 2023 11:00)  HR: 69 (21 Feb 2023 17:49) (65 - 75)  BP: 115/69 (21 Feb 2023 17:49) (107/61 - 138/71)  BP(mean): --  RR: 17 (21 Feb 2023 17:49) (17 - 20)  SpO2: 97% (21 Feb 2023 17:49) (91% - 98%)    Parameters below as of 21 Feb 2023 13:27  Patient On (Oxygen Delivery Method): nasal cannula  O2 Flow (L/min): 3    PHYSICAL EXAM:  GEN:         Awake, responsive and comfortable.  HEENT:    Normal.    RESP:        no wheezing.  CVS:          Regular rate and rhythm.     MEDICATIONS  (STANDING):  amLODIPine   Tablet 10 milliGRAM(s) Oral daily  apixaban 5 milliGRAM(s) Oral every 12 hours  aspirin enteric coated 81 milliGRAM(s) Oral daily  colchicine 0.6 milliGRAM(s) Oral daily  polyethylene glycol 3350 17 Gram(s) Oral daily  pregabalin 100 milliGRAM(s) Oral two times a day    MEDICATIONS  (PRN):  cyclobenzaprine 5 milliGRAM(s) Oral two times a day PRN Muscle Spasm  traMADol 50 milliGRAM(s) Oral every 6 hours PRN Mild Pain (1 - 3)    LABS:                        9.7    5.00  )-----------( 403      ( 21 Feb 2023 12:15 )             32.2     02-20    145  |  113<H>  |  6<L>  ----------------------------<  70  3.8   |  27  |  0.77    Ca    8.9      20 Feb 2023 06:05    ASSESSMENT AND PLAN:  ·	SOB.  ·	Chronic hypoxic Respiratory failure.  ·	Chronic pain syndrome.  ·	Anemia.  ·	Hypernatremia.  ·	Hypokalemia.  ·	HTN.  ·	Gout.  ·	Fibromyalgia.  ·	DVT hx.      SPO2 97% on nasal O2.  Continue Apixaban.  Pain control.  IV hydration.  PT evaluation.  Consider psych evaluation.    02/20/23:  Feeling better, SPO2 95%.  Continue treatment.    02/21/23: Reports SOB at time.  Add PRN duoneb

## 2023-02-21 NOTE — PROGRESS NOTE ADULT - ASSESSMENT
89 yo female PMH CVA x 3, HTN, carpal tunnel syndrome, gout , fibromyalgia/  PE  1/2023        Arrived   from home for generalized body pain  x 1 week.  RVP negative.     Admitted  with  diffuse myalgias, which pt attributes  to  her  dx  of fibromyalgia, archie  with b/l lower  leg pain. LE venous dopplers negative.     On Lyrica  and  tyleol prn, pain,  at home,  which  is  not  helping  per patient.      Covid, is   negative,     Prior CVA continue asa, lipitor and Eliquis.     HTN: on Norvasc    Anemia, CT abd and pelvis neg for GIB but showed enteritis and distension (will need follow up for early obstruction) and gallstones/polyps  RUQ US ordered to evaluate GB. ESR, CRP, CPK ordered. start miralax. Trial of flexeral for muscle pain. Monitor OFF atorvastatin

## 2023-02-21 NOTE — PHYSICAL THERAPY INITIAL EVALUATION ADULT - STRENGTHENING, PT EVAL
Improve strength in the UE and LE to5/5, improve endurance to good and be able to perform functional tasks-bed mobility, sitting, standing, transfers and ambulate in a safe manner with or without  assistive device and prevent falls.

## 2023-02-21 NOTE — PHYSICAL THERAPY INITIAL EVALUATION ADULT - GENERAL OBSERVATIONS, REHAB EVAL
Pt is alert, oriented x 4, follow instructions, on 3 LPM nasal cannula, c/o generalized weakness and body aches and pains especially in the extremities.

## 2023-02-21 NOTE — PHYSICAL THERAPY INITIAL EVALUATION ADULT - GAIT TRAINING, PT EVAL
Pt will be able to ambulate using assistive device up to 100  ft or more, be able to negotiate steps safely observing proper gait, posture and prevent falls.

## 2023-02-21 NOTE — PHYSICAL THERAPY INITIAL EVALUATION ADULT - RANGE OF MOTION EXAMINATION, REHAB EVAL
with pain upon movement/bilateral upper extremity ROM was WFL (within functional limits)/bilateral lower extremity ROM was WFL (within functional limits)

## 2023-02-21 NOTE — PHYSICAL THERAPY INITIAL EVALUATION ADULT - ADDITIONAL COMMENTS
As per patient she lives alone pvt house, prior to this admission she is independent in tasks- bed mobility, transfers and ambulation;  friends offers assistance periodically, has meal delivered to her house.

## 2023-02-21 NOTE — PROGRESS NOTE ADULT - SUBJECTIVE AND OBJECTIVE BOX
Patient is a 89y old  Female who presents with a chief complaint of myalgias (20 Feb 2023 22:11)        SUBJECTIVE / OVERNIGHT EVENTS: Patient had no acute events overnight. Patient seen and examined at bedside this morning. Continues to have generalized body pains. Endorses b/l thigh and calve muscle pain.     ROS: [ - ] Fever [ - ] Chills [ - ] Nausea/Vomiting [ - ] Chest Pain [ + ] Shortness of breath     MEDICATIONS  (STANDING):  amLODIPine   Tablet 10 milliGRAM(s) Oral daily  apixaban 5 milliGRAM(s) Oral every 12 hours  aspirin enteric coated 81 milliGRAM(s) Oral daily  atorvastatin 80 milliGRAM(s) Oral at bedtime  colchicine 0.6 milliGRAM(s) Oral daily  polyethylene glycol 3350 17 Gram(s) Oral daily  pregabalin 100 milliGRAM(s) Oral two times a day    MEDICATIONS  (PRN):  cyclobenzaprine 5 milliGRAM(s) Oral three times a day PRN Muscle Spasm  traMADol 50 milliGRAM(s) Oral every 6 hours PRN Mild Pain (1 - 3)      Vital Signs Last 24 Hrs  T(C): 36.7 (21 Feb 2023 13:27), Max: 37.1 (21 Feb 2023 11:00)  T(F): 98.1 (21 Feb 2023 13:27), Max: 98.8 (21 Feb 2023 11:00)  HR: 71 (21 Feb 2023 13:27) (65 - 75)  BP: 121/67 (21 Feb 2023 13:27) (107/61 - 138/71)  BP(mean): --  RR: 18 (21 Feb 2023 13:27) (17 - 20)  SpO2: 97% (21 Feb 2023 13:27) (91% - 98%)    Parameters below as of 21 Feb 2023 13:27  Patient On (Oxygen Delivery Method): nasal cannula  O2 Flow (L/min): 3    CAPILLARY BLOOD GLUCOSE        I&O's Summary    20 Feb 2023 07:01  -  21 Feb 2023 07:00  --------------------------------------------------------  IN: 480 mL / OUT: 1200 mL / NET: -720 mL        PHYSICAL EXAM  GENERAL: NAD, lying comfortably in bed on NC  HEENT:  Atraumatic, Normocephalic, EOMI, conjunctiva and sclera clear, no LAD  CHEST/LUNG: Clear to auscultation bilaterally; No wheeze  HEART: RRR, S1 and S2 No murmurs, rubs, or gallops  ABDOMEN: Soft, Nontender, Nondistended  EXTREMITIES:  2+ Peripheral Pulses, b/l calf and thigh tenderness. No swelling   NEURO: AAOx3, non-focal  SKIN: No rashes or lesions    LABS:                        9.7    5.00  )-----------( 403      ( 21 Feb 2023 12:15 )             32.2     02-20    145  |  113<H>  |  6<L>  ----------------------------<  70  3.8   |  27  |  0.77    Ca    8.9      20 Feb 2023 06:05                  RADIOLOGY & ADDITIONAL TESTS:  < from: CT Angio Abdomen and Pelvis w/ IV Cont (02.21.23 @ 16:03) >  IMPRESSION:    No CT evidence of active GI bleed.    Prominent fluid-filled small bowel loops suggestive of enteritis.   Recommend follow-up to exclude early or partial obstruction.    Gallstones versus gallbladder polyps. Recommend ultrasound.        --- End of Report ---    < end of copied text >      Labs Personally Reviewed  Imaging Personally Reviewed  Consultant(s) Notes Reviewed

## 2023-02-22 LAB
ANION GAP SERPL CALC-SCNC: 6 MMOL/L — SIGNIFICANT CHANGE UP (ref 5–17)
BUN SERPL-MCNC: 13 MG/DL — SIGNIFICANT CHANGE UP (ref 7–23)
CALCIUM SERPL-MCNC: 8.6 MG/DL — SIGNIFICANT CHANGE UP (ref 8.5–10.1)
CHLORIDE SERPL-SCNC: 110 MMOL/L — HIGH (ref 96–108)
CK SERPL-CCNC: 84 U/L — SIGNIFICANT CHANGE UP (ref 26–192)
CO2 SERPL-SCNC: 26 MMOL/L — SIGNIFICANT CHANGE UP (ref 22–31)
CREAT SERPL-MCNC: 0.83 MG/DL — SIGNIFICANT CHANGE UP (ref 0.5–1.3)
CRP SERPL-MCNC: <3 MG/L — SIGNIFICANT CHANGE UP
EGFR: 67 ML/MIN/1.73M2 — SIGNIFICANT CHANGE UP
ERYTHROCYTE [SEDIMENTATION RATE] IN BLOOD: 23 MM/HR — HIGH (ref 0–20)
GLUCOSE SERPL-MCNC: 78 MG/DL — SIGNIFICANT CHANGE UP (ref 70–99)
HCT VFR BLD CALC: 30.3 % — LOW (ref 34.5–45)
HGB BLD-MCNC: 9.1 G/DL — LOW (ref 11.5–15.5)
MCHC RBC-ENTMCNC: 24.9 PG — LOW (ref 27–34)
MCHC RBC-ENTMCNC: 30 G/DL — LOW (ref 32–36)
MCV RBC AUTO: 83 FL — SIGNIFICANT CHANGE UP (ref 80–100)
NRBC # BLD: 0 /100 WBCS — SIGNIFICANT CHANGE UP (ref 0–0)
PLATELET # BLD AUTO: 387 K/UL — SIGNIFICANT CHANGE UP (ref 150–400)
POTASSIUM SERPL-MCNC: 4 MMOL/L — SIGNIFICANT CHANGE UP (ref 3.5–5.3)
POTASSIUM SERPL-SCNC: 4 MMOL/L — SIGNIFICANT CHANGE UP (ref 3.5–5.3)
RBC # BLD: 3.65 M/UL — LOW (ref 3.8–5.2)
RBC # FLD: 15.8 % — HIGH (ref 10.3–14.5)
SODIUM SERPL-SCNC: 142 MMOL/L — SIGNIFICANT CHANGE UP (ref 135–145)
WBC # BLD: 4.55 K/UL — SIGNIFICANT CHANGE UP (ref 3.8–10.5)
WBC # FLD AUTO: 4.55 K/UL — SIGNIFICANT CHANGE UP (ref 3.8–10.5)

## 2023-02-22 PROCEDURE — 76705 ECHO EXAM OF ABDOMEN: CPT | Mod: 26

## 2023-02-22 PROCEDURE — 99232 SBSQ HOSP IP/OBS MODERATE 35: CPT

## 2023-02-22 RX ADMIN — Medication 0.6 MILLIGRAM(S): at 13:17

## 2023-02-22 RX ADMIN — Medication 100 MILLIGRAM(S): at 18:26

## 2023-02-22 RX ADMIN — Medication 81 MILLIGRAM(S): at 13:17

## 2023-02-22 RX ADMIN — POLYETHYLENE GLYCOL 3350 17 GRAM(S): 17 POWDER, FOR SOLUTION ORAL at 13:17

## 2023-02-22 RX ADMIN — AMLODIPINE BESYLATE 10 MILLIGRAM(S): 2.5 TABLET ORAL at 05:13

## 2023-02-22 RX ADMIN — APIXABAN 5 MILLIGRAM(S): 2.5 TABLET, FILM COATED ORAL at 18:26

## 2023-02-22 RX ADMIN — Medication 100 MILLIGRAM(S): at 05:13

## 2023-02-22 RX ADMIN — APIXABAN 5 MILLIGRAM(S): 2.5 TABLET, FILM COATED ORAL at 05:13

## 2023-02-22 NOTE — PROGRESS NOTE ADULT - ASSESSMENT
89 yo female PMH CVA x 3, HTN, carpal tunnel syndrome, gout , fibromyalgia/  PE  1/2023        Arrived   from home for generalized body pain  x 1 week.  RVP negative.     Admitted  with  diffuse myalgias, which pt attributes  to  her  dx  of fibromyalgia, archie  with b/l lower  leg pain. LE venous dopplers negative. Monitor off atorvastatin. If improve, will consider restarting statin lower dose.     On Lyrica  and  tyleol prn, pain,  at home,  which  is  not  helping  per patient.      Covid, is   negative,     Prior CVA continue asa, lipitor and Eliquis.     HTN: on Norvasc    Anemia, CT abd and pelvis neg for GIB    Consitpation:  CT showed enteritis and distension and gallstones/polyps. RUQ US confirm gallstones but asymptomatic. Low concerns for obstruction. Bowel sounds presents and large BM 2/22       Medically optimized for HARPAL

## 2023-02-22 NOTE — PROGRESS NOTE ADULT - SUBJECTIVE AND OBJECTIVE BOX
Patient is a 89y old  Female who presents with a chief complaint of myalgias (21 Feb 2023 20:15)        SUBJECTIVE / OVERNIGHT EVENTS: Patient had no acute events overnight. Patient seen and examined at bedside this morning. LE extremity pains improved. No pain when sitting, only with standing. Later in the morning, patient had large BM    ROS:     MEDICATIONS  (STANDING):  amLODIPine   Tablet 10 milliGRAM(s) Oral daily  apixaban 5 milliGRAM(s) Oral every 12 hours  aspirin enteric coated 81 milliGRAM(s) Oral daily  colchicine 0.6 milliGRAM(s) Oral daily  polyethylene glycol 3350 17 Gram(s) Oral daily  pregabalin 100 milliGRAM(s) Oral two times a day    MEDICATIONS  (PRN):  albuterol/ipratropium for Nebulization 3 milliLiter(s) Nebulizer every 6 hours PRN Shortness of Breath  cyclobenzaprine 5 milliGRAM(s) Oral two times a day PRN Muscle Spasm  traMADol 50 milliGRAM(s) Oral every 6 hours PRN Mild Pain (1 - 3)      Vital Signs Last 24 Hrs  T(C): 36.8 (22 Feb 2023 12:07), Max: 36.9 (21 Feb 2023 23:15)  T(F): 98.3 (22 Feb 2023 12:07), Max: 98.4 (21 Feb 2023 23:15)  HR: 70 (22 Feb 2023 12:07) (67 - 98)  BP: 116/64 (22 Feb 2023 12:07) (115/66 - 116/68)  BP(mean): --  RR: 18 (22 Feb 2023 12:07) (17 - 18)  SpO2: 98% (22 Feb 2023 12:07) (96% - 100%)      CAPILLARY BLOOD GLUCOSE        I&O's Summary    21 Feb 2023 07:01  -  22 Feb 2023 07:00  --------------------------------------------------------  IN: 0 mL / OUT: 500 mL / NET: -500 mL        PHYSICAL EXAM  GENERAL: NAD, lying comfortably in bed on NC  HEENT:  Atraumatic, Normocephalic, EOMI, conjunctiva and sclera clear, no LAD  CHEST/LUNG: Clear to auscultation bilaterally; No wheeze  HEART: RRR, S1 and S2 No murmurs, rubs, or gallops  ABDOMEN: Soft, Nontender, Nondistended  EXTREMITIES:  2+ Peripheral Pulses, b/l calf and thigh point tenderness. No swelling   NEURO: AAOx3, non-focal  SKIN: No rashes or lesions    LABS:                        9.1    4.55  )-----------( 387      ( 22 Feb 2023 05:45 )             30.3     02-22    142  |  110<H>  |  13  ----------------------------<  78  4.0   |  26  |  0.83    Ca    8.6      22 Feb 2023 05:45        CARDIAC MARKERS ( 22 Feb 2023 05:45 )  x     / x     / 84 U/L / x     / x                RADIOLOGY & ADDITIONAL TESTS:  < from: US Abdomen Limited (02.22.23 @ 09:34) >  IMPRESSION:    Trace sludge/sand like stones within the gallbladder.        --- End of Report ---      < end of copied text >      Labs Personally Reviewed  Imaging Personally Reviewed  Consultant(s) Notes Reviewed

## 2023-02-23 PROCEDURE — 99233 SBSQ HOSP IP/OBS HIGH 50: CPT

## 2023-02-23 RX ORDER — ATORVASTATIN CALCIUM 80 MG/1
40 TABLET, FILM COATED ORAL AT BEDTIME
Refills: 0 | Status: DISCONTINUED | OUTPATIENT
Start: 2023-02-23 | End: 2023-02-27

## 2023-02-23 RX ADMIN — AMLODIPINE BESYLATE 10 MILLIGRAM(S): 2.5 TABLET ORAL at 05:01

## 2023-02-23 RX ADMIN — APIXABAN 5 MILLIGRAM(S): 2.5 TABLET, FILM COATED ORAL at 05:02

## 2023-02-23 RX ADMIN — Medication 0.6 MILLIGRAM(S): at 12:35

## 2023-02-23 RX ADMIN — Medication 81 MILLIGRAM(S): at 12:35

## 2023-02-23 RX ADMIN — ATORVASTATIN CALCIUM 40 MILLIGRAM(S): 80 TABLET, FILM COATED ORAL at 22:15

## 2023-02-23 RX ADMIN — APIXABAN 5 MILLIGRAM(S): 2.5 TABLET, FILM COATED ORAL at 17:55

## 2023-02-23 RX ADMIN — TRAMADOL HYDROCHLORIDE 50 MILLIGRAM(S): 50 TABLET ORAL at 09:15

## 2023-02-23 RX ADMIN — TRAMADOL HYDROCHLORIDE 50 MILLIGRAM(S): 50 TABLET ORAL at 10:15

## 2023-02-23 RX ADMIN — Medication 100 MILLIGRAM(S): at 17:55

## 2023-02-23 RX ADMIN — Medication 100 MILLIGRAM(S): at 05:02

## 2023-02-23 NOTE — PROGRESS NOTE ADULT - ASSESSMENT
89 yo female PMH CVA x 3, HTN, carpal tunnel syndrome, gout , fibromyalgia/  PE  1/2023        Arrived   from home for generalized body pain  x 1 week.  RVP negative.     Admitted  with  diffuse myalgias, which pt attributes  to  her  dx  of fibromyalgia, archie  with b/l lower  leg pain. LE venous dopplers negative. Monitor off atorvastatin. Pt has no pain now, resumed statin.     On Lyrica  and  tyleol prn, pain,  at home,  which  is  not  helping  per patient.      Covid, is negative,     Prior CVA continue asa, lipitor and Eliquis.     HTN: on Norvasc    Anemia, CT abd and pelvis neg for GIB    Consitpation:  CT showed enteritis and distension and gallstones/polyps. RUQ US confirm gallstones but asymptomatic. Low concerns for obstruction. Bowel sounds presents and large BM 2/22. cont miralax      Medically optimized for HARPAL ([place]) - awaiting for auth

## 2023-02-23 NOTE — PROGRESS NOTE ADULT - SUBJECTIVE AND OBJECTIVE BOX
INTERVAL HPI:  89yo  F with h/o CVA x3, HTN, carpal tunnel syndrome, gout , fibromyalgia, h/o  PE  1/2023, on Eliquis bid, per  pt, ( but unsure as  to  why she takes  it ), chronic hypoxic Respiratory failure on home O2.  Came   from home for generalized body pain  x 1 week along with she could not bear her weight.  Admits to  chills which is not new but    denies fever, vomiting diarrhea, no chest pain.  Feels SOB  shortness of breath at times.      denies other complaints.      rvp is negative      pt lives  by herself (19 Feb 2023 12:22)    OVERNIGHT EVENTS:  Comfortable    Vital Signs Last 24 Hrs  T(C): 36.6 (23 Feb 2023 05:06), Max: 36.8 (22 Feb 2023 12:07)  T(F): 97.9 (23 Feb 2023 05:06), Max: 98.3 (22 Feb 2023 12:07)  HR: 74 (23 Feb 2023 05:06) (69 - 75)  BP: 126/74 (23 Feb 2023 05:06) (115/63 - 126/74)  BP(mean): --  RR: 17 (23 Feb 2023 05:06) (17 - 18)  SpO2: 97% (23 Feb 2023 05:06) (97% - 98%)    Parameters below as of 22 Feb 2023 19:05  Patient On (Oxygen Delivery Method): nasal cannula  O2 Flow (L/min): 3    PHYSICAL EXAM:  GEN:         Awake, responsive and comfortable.  HEENT:    Normal.    RESP:       no distress  CVS:          Regular rate and rhythm.     MEDICATIONS  (STANDING):  amLODIPine   Tablet 10 milliGRAM(s) Oral daily  apixaban 5 milliGRAM(s) Oral every 12 hours  aspirin enteric coated 81 milliGRAM(s) Oral daily  colchicine 0.6 milliGRAM(s) Oral daily  polyethylene glycol 3350 17 Gram(s) Oral daily  pregabalin 100 milliGRAM(s) Oral two times a day    MEDICATIONS  (PRN):  albuterol/ipratropium for Nebulization 3 milliLiter(s) Nebulizer every 6 hours PRN Shortness of Breath  cyclobenzaprine 5 milliGRAM(s) Oral two times a day PRN Muscle Spasm  traMADol 50 milliGRAM(s) Oral every 6 hours PRN Mild Pain (1 - 3)    LABS:                        9.1    4.55  )-----------( 387      ( 22 Feb 2023 05:45 )             30.3     02-22    142  |  110<H>  |  13  ----------------------------<  78  4.0   |  26  |  0.83    Ca    8.6      22 Feb 2023 05:45    ASSESSMENT AND PLAN:  ·	SOB.  ·	Chronic hypoxic Respiratory failure.  ·	Chronic pain syndrome.  ·	Anemia.  ·	Hypernatremia.  ·	Hypokalemia.  ·	HTN.  ·	Gout.  ·	Fibromyalgia.  ·	DVT hx.      SPO2 97% on nasal O2.  Continue Apixaban.  Pain control.  IV hydration.  PT evaluation.  Consider psych evaluation.    02/20/23:  Feeling better, SPO2 95%.  Continue treatment.    02/21/23: Reports SOB at time.  Add PRN duoneb    02/23/23:  Comfortable, SPo2 97%.  Pulmonary stable.  O2 and bronchodilators as needed.

## 2023-02-23 NOTE — PROGRESS NOTE ADULT - SUBJECTIVE AND OBJECTIVE BOX
Patient is a 89y old  Female who presents with a chief complaint of myalgias (23 Feb 2023 10:26)      INTERVAL HPI/OVERNIGHT EVENTS:  pt states she feels better. Pt has constipation.       REVIEW OF SYSTEMS:  CONSTITUTIONAL: No fever, weight loss, or fatigue  EYES: No eye pain, visual disturbances, or discharge  ENMT:  No difficulty hearing, tinnitus, vertigo; No sinus or throat pain  NECK: No pain or stiffness  RESPIRATORY: No cough, wheezing, chills or hemoptysis; No shortness of breath  CARDIOVASCULAR: No chest pain, palpitations, dizziness, or leg swelling  GASTROINTESTINAL: No abdominal or epigastric pain. No nausea, vomiting, or hematemesis; + constipation. No melena or hematochezia.  GENITOURINARY: No dysuria, frequency, hematuria, or incontinence  NEUROLOGICAL: No headaches, memory loss, loss of strength, numbness, or tremors  SKIN: No itching, burning, rashes, or lesions   LYMPH NODES: No enlarged glands  MUSCULOSKELETAL: + general body pain  HEME/LYMPH: No easy bruising, or bleeding gums      FAMILY HISTORY:  No pertinent family history in first degree relatives      Vital Signs Last 24 Hrs  T(C): 36.8 (23 Feb 2023 17:03), Max: 36.8 (22 Feb 2023 23:08)  T(F): 98.3 (23 Feb 2023 17:03), Max: 98.3 (23 Feb 2023 17:03)  HR: 75 (23 Feb 2023 17:03) (69 - 79)  BP: 129/73 (23 Feb 2023 17:03) (117/68 - 129/73)  BP(mean): --  RR: 18 (23 Feb 2023 17:03) (17 - 18)  SpO2: 96% (23 Feb 2023 17:03) (95% - 99%)    Parameters below as of 23 Feb 2023 17:03  Patient On (Oxygen Delivery Method): room air        02-22-23 @ 07:01  -  02-23-23 @ 07:00  --------------------------------------------------------  IN: 0 mL / OUT: 400 mL / NET: -400 mL    02-23-23 @ 07:01  -  02-23-23 @ 17:54  --------------------------------------------------------  IN: 240 mL / OUT: 300 mL / NET: -60 mL        PHYSICAL EXAM:  GENERAL: NAD, well-groomed, well-developed  HEAD:  Atraumatic, Normocephalic  EYES: EOMI, conjunctiva and sclera clear  ENMT: No tonsillar erythema, exudates, or enlargement; Moist mucous membranes  NECK: Supple, No JVD, Normal thyroid  NERVOUS SYSTEM:  Alert & Oriented, Good concentration   CHEST/LUNG:  No rales, rhonchi, wheezing, or rubs  HEART: Regular rate and rhythm; No murmurs, rubs, or gallops  ABDOMEN: Soft, Nontender, Nondistended; Bowel sounds present  EXTREMITIES:  2+ Peripheral Pulses, No clubbing, cyanosis, or edema  LYMPH: No lymphadenopathy noted  SKIN: No rashes or lesions    Consultant(s) Notes Reviewed:  [x ] YES  [ ] NO  Care Discussed with Consultants/Other Providers [ x] YES  [ ] NO    LABS:        RADIOLOGY & ADDITIONAL TESTS:    Imaging Personally Reviewed:  [ ] YES  [ ] NO  albuterol/ipratropium for Nebulization 3 milliLiter(s) Nebulizer every 6 hours PRN  amLODIPine   Tablet 10 milliGRAM(s) Oral daily  apixaban 5 milliGRAM(s) Oral every 12 hours  aspirin enteric coated 81 milliGRAM(s) Oral daily  colchicine 0.6 milliGRAM(s) Oral daily  cyclobenzaprine 5 milliGRAM(s) Oral two times a day PRN  polyethylene glycol 3350 17 Gram(s) Oral daily  pregabalin 100 milliGRAM(s) Oral two times a day  traMADol 50 milliGRAM(s) Oral every 6 hours PRN      HEALTH ISSUES - PROBLEM Dx:

## 2023-02-24 DIAGNOSIS — M79.7 FIBROMYALGIA: ICD-10-CM

## 2023-02-24 DIAGNOSIS — Z86.73 PERSONAL HISTORY OF TRANSIENT ISCHEMIC ATTACK (TIA), AND CEREBRAL INFARCTION WITHOUT RESIDUAL DEFICITS: ICD-10-CM

## 2023-02-24 DIAGNOSIS — I26.99 OTHER PULMONARY EMBOLISM WITHOUT ACUTE COR PULMONALE: ICD-10-CM

## 2023-02-24 DIAGNOSIS — I10 ESSENTIAL (PRIMARY) HYPERTENSION: ICD-10-CM

## 2023-02-24 DIAGNOSIS — J96.11 CHRONIC RESPIRATORY FAILURE WITH HYPOXIA: ICD-10-CM

## 2023-02-24 DIAGNOSIS — M79.10 MYALGIA, UNSPECIFIED SITE: ICD-10-CM

## 2023-02-24 DIAGNOSIS — M10.9 GOUT, UNSPECIFIED: ICD-10-CM

## 2023-02-24 LAB
FLUAV AG NPH QL: SIGNIFICANT CHANGE UP
FLUBV AG NPH QL: SIGNIFICANT CHANGE UP
HCT VFR BLD CALC: 32.5 % — LOW (ref 34.5–45)
HGB BLD-MCNC: 9.8 G/DL — LOW (ref 11.5–15.5)
MCHC RBC-ENTMCNC: 25.6 PG — LOW (ref 27–34)
MCHC RBC-ENTMCNC: 30.2 G/DL — LOW (ref 32–36)
MCV RBC AUTO: 84.9 FL — SIGNIFICANT CHANGE UP (ref 80–100)
NRBC # BLD: 0 /100 WBCS — SIGNIFICANT CHANGE UP (ref 0–0)
PLATELET # BLD AUTO: 395 K/UL — SIGNIFICANT CHANGE UP (ref 150–400)
RBC # BLD: 3.83 M/UL — SIGNIFICANT CHANGE UP (ref 3.8–5.2)
RBC # FLD: 15.6 % — HIGH (ref 10.3–14.5)
SARS-COV-2 RNA SPEC QL NAA+PROBE: SIGNIFICANT CHANGE UP
WBC # BLD: 3.59 K/UL — LOW (ref 3.8–10.5)
WBC # FLD AUTO: 3.59 K/UL — LOW (ref 3.8–10.5)

## 2023-02-24 PROCEDURE — 99233 SBSQ HOSP IP/OBS HIGH 50: CPT

## 2023-02-24 RX ADMIN — AMLODIPINE BESYLATE 10 MILLIGRAM(S): 2.5 TABLET ORAL at 05:52

## 2023-02-24 RX ADMIN — ATORVASTATIN CALCIUM 40 MILLIGRAM(S): 80 TABLET, FILM COATED ORAL at 22:08

## 2023-02-24 RX ADMIN — TRAMADOL HYDROCHLORIDE 50 MILLIGRAM(S): 50 TABLET ORAL at 19:21

## 2023-02-24 RX ADMIN — APIXABAN 5 MILLIGRAM(S): 2.5 TABLET, FILM COATED ORAL at 05:52

## 2023-02-24 RX ADMIN — Medication 0.6 MILLIGRAM(S): at 11:40

## 2023-02-24 RX ADMIN — TRAMADOL HYDROCHLORIDE 50 MILLIGRAM(S): 50 TABLET ORAL at 18:37

## 2023-02-24 RX ADMIN — POLYETHYLENE GLYCOL 3350 17 GRAM(S): 17 POWDER, FOR SOLUTION ORAL at 11:39

## 2023-02-24 RX ADMIN — Medication 81 MILLIGRAM(S): at 11:40

## 2023-02-24 RX ADMIN — Medication 100 MILLIGRAM(S): at 17:20

## 2023-02-24 RX ADMIN — Medication 100 MILLIGRAM(S): at 05:52

## 2023-02-24 RX ADMIN — APIXABAN 5 MILLIGRAM(S): 2.5 TABLET, FILM COATED ORAL at 17:20

## 2023-02-24 NOTE — PROGRESS NOTE ADULT - SUBJECTIVE AND OBJECTIVE BOX
Patient is a 89y old  Female who presents with a chief complaint of myalgias (23 Feb 2023 17:54)      INTERVAL HPI/OVERNIGHT EVENTS:  Pt states breathing is same as usual. On NC 3L. Pt states she has "soar" on RUQ, but not pain and acceptable.    REVIEW OF SYSTEMS:  CONSTITUTIONAL: No fever, weight loss, or fatigue  EYES: No eye pain, visual disturbances, or discharge  ENMT:  No difficulty hearing, tinnitus, vertigo; No sinus or throat pain  NECK: No pain or stiffness  RESPIRATORY: No cough, wheezing, chills or hemoptysis; No shortness of breath  CARDIOVASCULAR: No chest pain, palpitations, dizziness, or leg swelling  GASTROINTESTINAL: No abdominal or epigastric pain. No nausea, vomiting, or hematemesis; + constipation. No melena or hematochezia.  GENITOURINARY: No dysuria, frequency, hematuria, or incontinence  NEUROLOGICAL: No headaches, memory loss, loss of strength, numbness, or tremors  SKIN: No itching, burning, rashes, or lesions   LYMPH NODES: No enlarged glands  MUSCULOSKELETAL: + general body pain  HEME/LYMPH: No easy bruising, or bleeding gums      FAMILY HISTORY:  No pertinent family history in first degree relatives      PHYSICAL EXAM:  GENERAL: NAD, well-groomed, well-developed  HEAD:  Atraumatic, Normocephalic  EYES: EOMI, conjunctiva and sclera clear +NC3L  ENMT: No tonsillar erythema, exudates, or enlargement; Moist mucous membranes  NECK: Supple, No JVD, Normal thyroid  NERVOUS SYSTEM:  Alert & Oriented, Good concentration   CHEST/LUNG:  No rales, rhonchi, wheezing, or rubs  HEART: Regular rate and rhythm; No murmurs, rubs, or gallops  ABDOMEN: Soft, Nontender, Nondistended; Bowel sounds present  EXTREMITIES:  2+ Peripheral Pulses, No clubbing, cyanosis, or edema  LYMPH: No lymphadenopathy noted  SKIN: No rashes or lesions      Vital Signs Last 24 Hrs  T(C): 36.5 (24 Feb 2023 11:10), Max: 36.9 (24 Feb 2023 00:05)  T(F): 97.7 (24 Feb 2023 11:10), Max: 98.4 (24 Feb 2023 00:05)  HR: 86 (24 Feb 2023 14:41) (72 - 86)  BP: 121/66 (24 Feb 2023 14:41) (107/62 - 129/73)  BP(mean): --  RR: 17 (24 Feb 2023 11:10) (16 - 18)  SpO2: 97% (24 Feb 2023 14:41) (96% - 97%)    Parameters below as of 24 Feb 2023 14:41  Patient On (Oxygen Delivery Method): nasal cannula  O2 Flow (L/min): 3      02-23-23 @ 07:01  -  02-24-23 @ 07:00  --------------------------------------------------------  IN: 540 mL / OUT: 1000 mL / NET: -460 mL    02-24-23 @ 07:01  -  02-24-23 @ 16:16  --------------------------------------------------------  IN: 860 mL / OUT: 800 mL / NET: 60 mL        Consultant(s) Notes Reviewed:  [x ] YES  [ ] NO  Care Discussed with Consultants/Other Providers [ x] YES  [ ] NO    LABS:        RADIOLOGY & ADDITIONAL TESTS:    Imaging Personally Reviewed:  [ ] YES  [ ] NO  albuterol/ipratropium for Nebulization 3 milliLiter(s) Nebulizer every 6 hours PRN  amLODIPine   Tablet 10 milliGRAM(s) Oral daily  apixaban 5 milliGRAM(s) Oral every 12 hours  aspirin enteric coated 81 milliGRAM(s) Oral daily  atorvastatin 40 milliGRAM(s) Oral at bedtime  colchicine 0.6 milliGRAM(s) Oral daily  cyclobenzaprine 5 milliGRAM(s) Oral two times a day PRN  polyethylene glycol 3350 17 Gram(s) Oral daily  pregabalin 100 milliGRAM(s) Oral two times a day  traMADol 50 milliGRAM(s) Oral every 6 hours PRN      HEALTH ISSUES - PROBLEM Dx:

## 2023-02-24 NOTE — PROGRESS NOTE ADULT - ASSESSMENT
89 yo female PMH CVA x 3, HTN, carpal tunnel syndrome, gout , fibromyalgia/  PE  1/2023 On home O2 Arrived   from home for generalized body pain  x 1 week.  RVP negative.   Admitted  with  diffuse myalgias, which pt attributes  to  her  dx  of fibromyalgia, archie  with b/l lower  leg pain. LE venous dopplers negative. Monitor off atorvastatin. Pt has no pain now, resumed statin.     On Lyrica  and  tyleol prn, pain,  at home,  which  is  not  helping  per patient.      Covid, is negative,     Prior CVA continue asa, lipitor and Eliquis.     HTN: on Norvasc    Anemia, CT abd and pelvis neg for GIB    Consitpation:  CT showed enteritis and distension and gallstones/polyps. RUQ US confirm gallstones but asymptomatic. Low concerns for obstruction. Bowel sounds presents and large BM 2/22. cont miralax      Medically optimized for HARPAL (Orzac) - awaiting for auth

## 2023-02-25 PROCEDURE — 99233 SBSQ HOSP IP/OBS HIGH 50: CPT

## 2023-02-25 RX ADMIN — TRAMADOL HYDROCHLORIDE 50 MILLIGRAM(S): 50 TABLET ORAL at 03:09

## 2023-02-25 RX ADMIN — AMLODIPINE BESYLATE 10 MILLIGRAM(S): 2.5 TABLET ORAL at 06:10

## 2023-02-25 RX ADMIN — ATORVASTATIN CALCIUM 40 MILLIGRAM(S): 80 TABLET, FILM COATED ORAL at 21:47

## 2023-02-25 RX ADMIN — POLYETHYLENE GLYCOL 3350 17 GRAM(S): 17 POWDER, FOR SOLUTION ORAL at 11:50

## 2023-02-25 RX ADMIN — TRAMADOL HYDROCHLORIDE 50 MILLIGRAM(S): 50 TABLET ORAL at 17:37

## 2023-02-25 RX ADMIN — Medication 0.6 MILLIGRAM(S): at 11:50

## 2023-02-25 RX ADMIN — APIXABAN 5 MILLIGRAM(S): 2.5 TABLET, FILM COATED ORAL at 06:10

## 2023-02-25 RX ADMIN — Medication 100 MILLIGRAM(S): at 06:09

## 2023-02-25 RX ADMIN — TRAMADOL HYDROCHLORIDE 50 MILLIGRAM(S): 50 TABLET ORAL at 02:09

## 2023-02-25 RX ADMIN — Medication 81 MILLIGRAM(S): at 11:50

## 2023-02-25 RX ADMIN — TRAMADOL HYDROCHLORIDE 50 MILLIGRAM(S): 50 TABLET ORAL at 18:37

## 2023-02-25 RX ADMIN — APIXABAN 5 MILLIGRAM(S): 2.5 TABLET, FILM COATED ORAL at 17:31

## 2023-02-25 RX ADMIN — Medication 100 MILLIGRAM(S): at 17:31

## 2023-02-25 NOTE — PROGRESS NOTE ADULT - SUBJECTIVE AND OBJECTIVE BOX
Patient is a 89y old  Female who presents with a chief complaint of WEAKNESS     (25 Feb 2023 14:00)      INTERVAL HPI/OVERNIGHT EVENTS:  Pt states she has hand pain and soar on RUQ, but acceptable and doesn't need pain meds.    REVIEW OF SYSTEMS:  CONSTITUTIONAL: No fever, weight loss, or fatigue  EYES: No eye pain, visual disturbances, or discharge  ENMT:  No difficulty hearing, tinnitus, vertigo; No sinus or throat pain  NECK: No pain or stiffness  RESPIRATORY: No cough, wheezing, chills or hemoptysis; No shortness of breath  CARDIOVASCULAR: No chest pain, palpitations, dizziness, or leg swelling  GASTROINTESTINAL: No abdominal or epigastric pain. No nausea, vomiting, or hematemesis; + constipation. No melena or hematochezia.  GENITOURINARY: No dysuria, frequency, hematuria, or incontinence  NEUROLOGICAL: No headaches, memory loss, loss of strength, numbness, or tremors  SKIN: No itching, burning, rashes, or lesions   LYMPH NODES: No enlarged glands  MUSCULOSKELETAL: + general body pain  HEME/LYMPH: No easy bruising, or bleeding gums      FAMILY HISTORY:  No pertinent family history in first degree relatives      PHYSICAL EXAM:  GENERAL: NAD, well-groomed, well-developed  HEAD:  Atraumatic, Normocephalic  EYES: EOMI, conjunctiva and sclera clear +NC3L  ENMT: No tonsillar erythema, exudates, or enlargement; Moist mucous membranes  NECK: Supple, No JVD, Normal thyroid  NERVOUS SYSTEM:  Alert & Oriented, Good concentration   CHEST/LUNG:  No rales, rhonchi, wheezing, or rubs  HEART: Regular rate and rhythm; No murmurs, rubs, or gallops  ABDOMEN: Soft, Nontender, Nondistended; Bowel sounds present  EXTREMITIES:  2+ Peripheral Pulses, No clubbing, cyanosis, or edema  LYMPH: No lymphadenopathy noted  SKIN: No rashes or lesions        Vital Signs Last 24 Hrs  T(C): 37 (25 Feb 2023 11:34), Max: 37 (25 Feb 2023 11:34)  T(F): 98.6 (25 Feb 2023 11:34), Max: 98.6 (25 Feb 2023 11:34)  HR: 100 (25 Feb 2023 11:34) (62 - 100)  BP: 121/63 (25 Feb 2023 11:34) (119/68 - 126/74)  BP(mean): --  RR: 17 (25 Feb 2023 11:34) (17 - 17)  SpO2: 96% (25 Feb 2023 11:34) (96% - 97%)    Parameters below as of 25 Feb 2023 11:34  Patient On (Oxygen Delivery Method): nasal cannula        02-24-23 @ 07:01  -  02-25-23 @ 07:00  --------------------------------------------------------  IN: 860 mL / OUT: 1300 mL / NET: -440 mL    02-25-23 @ 07:01  -  02-25-23 @ 15:12  --------------------------------------------------------  IN: 480 mL / OUT: 0 mL / NET: 480 mL          Consultant(s) Notes Reviewed:  [x ] YES  [ ] NO  Care Discussed with Consultants/Other Providers [ x] YES  [ ] NO    LABS:        RADIOLOGY & ADDITIONAL TESTS:    Imaging Personally Reviewed:  [ ] YES  [ ] NO  albuterol/ipratropium for Nebulization 3 milliLiter(s) Nebulizer every 6 hours PRN  amLODIPine   Tablet 10 milliGRAM(s) Oral daily  apixaban 5 milliGRAM(s) Oral every 12 hours  aspirin enteric coated 81 milliGRAM(s) Oral daily  atorvastatin 40 milliGRAM(s) Oral at bedtime  colchicine 0.6 milliGRAM(s) Oral daily  cyclobenzaprine 5 milliGRAM(s) Oral two times a day PRN  polyethylene glycol 3350 17 Gram(s) Oral daily  pregabalin 100 milliGRAM(s) Oral two times a day  traMADol 50 milliGRAM(s) Oral every 6 hours PRN      HEALTH ISSUES - PROBLEM Dx:  History of stroke    HTN (hypertension)    Fibromyalgia    Gout    Pulmonary embolism    Chronic respiratory failure with hypoxia    Myalgia

## 2023-02-25 NOTE — DIETITIAN NUTRITION RISK NOTIFICATION - TREATMENT: THE FOLLOWING DIET HAS BEEN RECOMMENDED
Diet, Regular:   Low Sodium (02-25-23 @ 14:44) [Pending Verification By Attending]  Diet, Regular (02-19-23 @ 12:46) [Active]

## 2023-02-25 NOTE — DIETITIAN INITIAL EVALUATION ADULT - PERTINENT MEDS FT
MEDICATIONS  (STANDING):  amLODIPine   Tablet 10 milliGRAM(s) Oral daily  apixaban 5 milliGRAM(s) Oral every 12 hours  aspirin enteric coated 81 milliGRAM(s) Oral daily  atorvastatin 40 milliGRAM(s) Oral at bedtime  colchicine 0.6 milliGRAM(s) Oral daily  polyethylene glycol 3350 17 Gram(s) Oral daily  pregabalin 100 milliGRAM(s) Oral two times a day    MEDICATIONS  (PRN):  albuterol/ipratropium for Nebulization 3 milliLiter(s) Nebulizer every 6 hours PRN Shortness of Breath  cyclobenzaprine 5 milliGRAM(s) Oral two times a day PRN Muscle Spasm  traMADol 50 milliGRAM(s) Oral every 6 hours PRN Mild Pain (1 - 3)

## 2023-02-25 NOTE — DIETITIAN INITIAL EVALUATION ADULT - ENERGY INTAKE
% as per flow sheets. Pt reports fair PO intake today (02/25). Refuses nutritional supplement at this time.

## 2023-02-25 NOTE — DIETITIAN INITIAL EVALUATION ADULT - ORAL INTAKE PTA/DIET HISTORY
Pt reports she does shopping and cooking at home PTA. States she receives prepared meals from community services PTA. States she has no diet restrictions at home and likes to use salt and spices in her food.

## 2023-02-25 NOTE — PROGRESS NOTE ADULT - ASSESSMENT
89 yo female PMH CVA x 3, HTN, carpal tunnel syndrome, gout , fibromyalgia/  PE  1/2023 On home O2 Arrived   from home for generalized body pain  x 1 week.  RVP negative.   Admitted  with  diffuse myalgias, which pt attributes  to  her  dx  of fibromyalgia, archie  with b/l lower  leg pain. LE venous dopplers negative. Monitor off atorvastatin. Pt has no pain now, resumed statin.     On Lyrica  and  tyleol prn, pain,  at home,  which  is  not  helping  per patient.      Covid, is negative,     #Fibromyalgia: cont Lyrica, cyclobenzaprine, tramadol PRN    #Prior CVA: continue asa, lipitor and Eliquis.     #HTN: on Norvasc    #Anemia, CT abd and pelvis neg for GIB    #Consitpation:  CT showed enteritis and distension and gallstones/polyps. RUQ US confirm gallstones but asymptomatic. Low concerns for obstruction. Bowel sounds presents and large BM 2/22. cont miralax      Medically optimized for HARPAL (Orzac) - awaiting for auth

## 2023-02-25 NOTE — DIETITIAN INITIAL EVALUATION ADULT - PHYSICAL ASSESSMENT TEMPLES
Diabetic Instructions from Dr. Jose Kasper:    Do your best to reduce how much sugar and processed starches you eat (white rice, pasta, potatoes, chips, crackers, snacks, etc)    Start to count your carbs - (My Fitness Pal is great for this) - limit your carbs to 150grams of carbs per day    Avoid any food item with more than 10grams of sugar per serving    Good fruits to eat:  apples, berries, cherries, peaches, plums  Fruits to avoid:  bananas, citrus, grapes, tropical fruits, dried fruits    Most of your food should be vegetables, select fruits, lean proteins (seafood, chicken, turkey), and nuts, beans, eggs.    Get bloodwork  Filled medication today    Follow up 3 months   mild

## 2023-02-25 NOTE — DIETITIAN INITIAL EVALUATION ADULT - OTHER INFO
Denies difficulty chewing/swallowing with food/drink.    Pt reports  pounds. Weight stable.     Discussed with pt importance of low sodium diet with history of multiple CVAs. Pt made aware RD remains available.

## 2023-02-25 NOTE — DIETITIAN INITIAL EVALUATION ADULT - CALCULATED TO (G/KG)
72.12 Olumiant Pregnancy And Lactation Text: Based on animal studies, Olumiant may cause embryo-fetal harm when administered to pregnant women.  The medication should not be used in pregnancy.  Breastfeeding is not recommended during treatment.

## 2023-02-26 PROCEDURE — 99232 SBSQ HOSP IP/OBS MODERATE 35: CPT

## 2023-02-26 RX ADMIN — AMLODIPINE BESYLATE 10 MILLIGRAM(S): 2.5 TABLET ORAL at 05:42

## 2023-02-26 RX ADMIN — APIXABAN 5 MILLIGRAM(S): 2.5 TABLET, FILM COATED ORAL at 17:11

## 2023-02-26 RX ADMIN — TRAMADOL HYDROCHLORIDE 50 MILLIGRAM(S): 50 TABLET ORAL at 06:45

## 2023-02-26 RX ADMIN — Medication 100 MILLIGRAM(S): at 17:11

## 2023-02-26 RX ADMIN — TRAMADOL HYDROCHLORIDE 50 MILLIGRAM(S): 50 TABLET ORAL at 15:30

## 2023-02-26 RX ADMIN — TRAMADOL HYDROCHLORIDE 50 MILLIGRAM(S): 50 TABLET ORAL at 05:45

## 2023-02-26 RX ADMIN — APIXABAN 5 MILLIGRAM(S): 2.5 TABLET, FILM COATED ORAL at 05:42

## 2023-02-26 RX ADMIN — Medication 0.6 MILLIGRAM(S): at 12:34

## 2023-02-26 RX ADMIN — POLYETHYLENE GLYCOL 3350 17 GRAM(S): 17 POWDER, FOR SOLUTION ORAL at 12:34

## 2023-02-26 RX ADMIN — Medication 81 MILLIGRAM(S): at 12:34

## 2023-02-26 RX ADMIN — TRAMADOL HYDROCHLORIDE 50 MILLIGRAM(S): 50 TABLET ORAL at 14:52

## 2023-02-26 RX ADMIN — ATORVASTATIN CALCIUM 40 MILLIGRAM(S): 80 TABLET, FILM COATED ORAL at 21:54

## 2023-02-26 RX ADMIN — Medication 100 MILLIGRAM(S): at 05:42

## 2023-02-26 NOTE — PROGRESS NOTE ADULT - SUBJECTIVE AND OBJECTIVE BOX
Patient is a 89y old  Female who presents with a chief complaint of myalgias (26 Feb 2023 09:20)      SUBJECTIVE / OVERNIGHT EVENTS: Patient seen and examined at bedside. No acute events overnight. Denies myalgias at rest. States she only gets muscle aches when moving around.    MEDICATIONS  (STANDING):  amLODIPine   Tablet 10 milliGRAM(s) Oral daily  apixaban 5 milliGRAM(s) Oral every 12 hours  aspirin enteric coated 81 milliGRAM(s) Oral daily  atorvastatin 40 milliGRAM(s) Oral at bedtime  colchicine 0.6 milliGRAM(s) Oral daily  polyethylene glycol 3350 17 Gram(s) Oral daily  pregabalin 100 milliGRAM(s) Oral two times a day    MEDICATIONS  (PRN):  albuterol/ipratropium for Nebulization 3 milliLiter(s) Nebulizer every 6 hours PRN Shortness of Breath  traMADol 50 milliGRAM(s) Oral every 6 hours PRN Mild Pain (1 - 3)      CAPILLARY BLOOD GLUCOSE        I&O's Summary    25 Feb 2023 07:01  -  26 Feb 2023 07:00  --------------------------------------------------------  IN: 560 mL / OUT: 600 mL / NET: -40 mL        PHYSICAL EXAM:  Vital Signs Last 24 Hrs  T(C): 36.9 (26 Feb 2023 12:07), Max: 36.9 (25 Feb 2023 23:00)  T(F): 98.4 (26 Feb 2023 12:07), Max: 98.5 (25 Feb 2023 23:00)  HR: 72 (26 Feb 2023 12:07) (68 - 72)  BP: 123/67 (26 Feb 2023 12:07) (117/71 - 125/71)  BP(mean): --  RR: 17 (26 Feb 2023 12:07) (16 - 18)  SpO2: 93% (26 Feb 2023 12:07) (93% - 96%)    Parameters below as of 26 Feb 2023 05:00  Patient On (Oxygen Delivery Method): nasal cannula    GEN: female in NAD, appears comfortable, no diaphoresis  EYES: No scleral injection, PERRL, EOMI  ENTM: neck supple & symmetric without tracheal deviation, moist membranes, no gross hearing impairment, thyroid gland not enlarged  CV: +S1/S2, no m/r/g, no abdominal bruit, no LE edema  RESP: breathing comfortably, no respiratory accessory muscle use, CTAB, no w/r/r  GI: normoactive BS, soft, NTND, no rebounding/guarding, no palpable masses    LABS:                      RADIOLOGY & ADDITIONAL TESTS:  Results Reviewed:   Imaging Personally Reviewed:  Electrocardiogram Personally Reviewed:    COORDINATION OF CARE:  Care Discussed with Consultants/Other Providers [Y/N]:  Prior or Outpatient Records Reviewed [Y/N]:

## 2023-02-26 NOTE — PROGRESS NOTE ADULT - SUBJECTIVE AND OBJECTIVE BOX
INTERVAL HPI:  89yo  F with h/o CVA x3, HTN, carpal tunnel syndrome, gout , fibromyalgia, h/o  PE  1/2023, on Eliquis bid, per  pt, ( but unsure as  to  why she takes  it ), chronic hypoxic Respiratory failure on home O2.  Came   from home for generalized body pain  x 1 week along with she could not bear her weight.  Admits to  chills which is not new but    denies fever, vomiting diarrhea, no chest pain.  Feels SOB  shortness of breath at times.      denies other complaints.      rvp is negative      pt lives  by herself (19 Feb 2023 12:22    OVERNIGHT EVENTS:  Awake, responsive and comfortable    Vital Signs Last 24 Hrs  T(C): 36.6 (26 Feb 2023 16:30), Max: 36.9 (25 Feb 2023 23:00)  T(F): 97.8 (26 Feb 2023 16:30), Max: 98.5 (25 Feb 2023 23:00)  HR: 70 (26 Feb 2023 16:30) (68 - 72)  BP: 112/65 (26 Feb 2023 16:30) (112/65 - 125/71)  BP(mean): --  RR: 18 (26 Feb 2023 16:30) (16 - 18)  SpO2: 95% (26 Feb 2023 16:30) (93% - 95%)    Parameters below as of 26 Feb 2023 05:00  Patient On (Oxygen Delivery Method): nasal cannula    PHYSICAL EXAM:  GEN:         Awake, responsive and comfortable.  HEENT:    Normal.    RESP:       no distress  CVS:          Regular rate and rhythm.     MEDICATIONS  (STANDING):  amLODIPine   Tablet 10 milliGRAM(s) Oral daily  apixaban 5 milliGRAM(s) Oral every 12 hours  aspirin enteric coated 81 milliGRAM(s) Oral daily  atorvastatin 40 milliGRAM(s) Oral at bedtime  colchicine 0.6 milliGRAM(s) Oral daily  polyethylene glycol 3350 17 Gram(s) Oral daily    MEDICATIONS  (PRN):  albuterol/ipratropium for Nebulization 3 milliLiter(s) Nebulizer every 6 hours PRN Shortness of Breath  traMADol 50 milliGRAM(s) Oral every 6 hours PRN Mild Pain (1 - 3)    ASSESSMENT AND PLAN:  ·	SOB.  ·	Chronic hypoxic Respiratory failure.  ·	Chronic pain syndrome.  ·	Anemia.  ·	Hypernatremia.  ·	Hypokalemia.  ·	HTN.  ·	Gout.  ·	Fibromyalgia.  ·	DVT hx.  ·	PE, small in 12/22 and 01/23      SPO2 97% on nasal O2.  Continue Apixaban.  Pain control.  IV hydration.  PT evaluation.  Consider psych evaluation.    02/26/23:  SPO2 95% on nasal O2 which is close to base line.  Continue apixaban.

## 2023-02-26 NOTE — PROGRESS NOTE ADULT - ASSESSMENT
89F with PMHx of CVA (x3), HTN, fibromyalgia, and recent diagnosis of PE (01/2023) presenting for myalgias and admitted for PT evaluation.    #Myalgia - resolved and likely due to fibromyalgia. They occur when she exerts herself.  - RVP negative (unlikely viral)  - Tolerating statin  - Needs HARPAL for graded exercise program  - Fibromyalgia treatment as below    #Fibromyalgia  - Continue with Lyrica  - Tramadol PRN pain    #Prior CVA  - Continue with baby aspirin and high dose statin    #Prior PE  - Pulm consult appreciated  - Continue with Eliquis (bleeding risk minima)    #HTN  - Continue with Amlodipine    Medically optimized for HARPAL (Orzac)--awaiting auth

## 2023-02-27 ENCOUNTER — TRANSCRIPTION ENCOUNTER (OUTPATIENT)
Age: 88
End: 2023-02-27

## 2023-02-27 VITALS
RESPIRATION RATE: 18 BRPM | DIASTOLIC BLOOD PRESSURE: 73 MMHG | TEMPERATURE: 98 F | HEART RATE: 74 BPM | SYSTOLIC BLOOD PRESSURE: 125 MMHG | OXYGEN SATURATION: 97 %

## 2023-02-27 RX ORDER — APIXABAN 2.5 MG/1
1 TABLET, FILM COATED ORAL
Qty: 0 | Refills: 0 | DISCHARGE

## 2023-02-27 RX ORDER — POLYETHYLENE GLYCOL 3350 17 G/17G
17 POWDER, FOR SOLUTION ORAL
Qty: 0 | Refills: 0 | DISCHARGE
Start: 2023-02-27

## 2023-02-27 RX ORDER — TRAMADOL HYDROCHLORIDE 50 MG/1
1 TABLET ORAL
Qty: 0 | Refills: 0 | DISCHARGE
Start: 2023-02-27

## 2023-02-27 RX ORDER — APIXABAN 2.5 MG/1
1 TABLET, FILM COATED ORAL
Qty: 0 | Refills: 0 | DISCHARGE
Start: 2023-02-27

## 2023-02-27 RX ADMIN — AMLODIPINE BESYLATE 10 MILLIGRAM(S): 2.5 TABLET ORAL at 05:31

## 2023-02-27 RX ADMIN — APIXABAN 5 MILLIGRAM(S): 2.5 TABLET, FILM COATED ORAL at 05:31

## 2023-02-27 RX ADMIN — APIXABAN 5 MILLIGRAM(S): 2.5 TABLET, FILM COATED ORAL at 17:36

## 2023-02-27 RX ADMIN — Medication 0.6 MILLIGRAM(S): at 14:22

## 2023-02-27 RX ADMIN — Medication 81 MILLIGRAM(S): at 14:24

## 2023-02-27 NOTE — PROGRESS NOTE ADULT - SUBJECTIVE AND OBJECTIVE BOX
INTERVAL HPI:  89yo  F with h/o CVA x3, HTN, carpal tunnel syndrome, gout , fibromyalgia, h/o  PE  1/2023, on Eliquis bid, per  pt, ( but unsure as  to  why she takes  it ), chronic hypoxic Respiratory failure on home O2.  Came   from home for generalized body pain  x 1 week along with she could not bear her weight.  Admits to  chills which is not new but    denies fever, vomiting diarrhea, no chest pain.  Feels SOB  shortness of breath at times.      denies other complaints.      rvp is negative      pt lives  by herself (19 Feb 2023 12:22    OVERNIGHT EVENTS:  Denies SOB, over all feels better,.    Vital Signs Last 24 Hrs  T(C): 36.4 (27 Feb 2023 16:25), Max: 36.9 (26 Feb 2023 23:31)  T(F): 97.6 (27 Feb 2023 16:25), Max: 98.4 (26 Feb 2023 23:31)  HR: 74 (27 Feb 2023 16:25) (58 - 82)  BP: 125/73 (27 Feb 2023 16:25) (122/68 - 126/72)  BP(mean): --  RR: 18 (27 Feb 2023 16:25) (17 - 18)  SpO2: 97% (27 Feb 2023 16:25) (97% - 98%)    Parameters below as of 27 Feb 2023 04:56  Patient On (Oxygen Delivery Method): nasal cannula    PHYSICAL EXAM:  GEN:         Awake, responsive and comfortable.  HEENT:    Normal.    RESP:      no wheezing.  CVS:         Regular rate and rhythm.     MEDICATIONS  (STANDING):  amLODIPine   Tablet 10 milliGRAM(s) Oral daily  apixaban 5 milliGRAM(s) Oral every 12 hours  aspirin enteric coated 81 milliGRAM(s) Oral daily  atorvastatin 40 milliGRAM(s) Oral at bedtime  colchicine 0.6 milliGRAM(s) Oral daily  polyethylene glycol 3350 17 Gram(s) Oral daily    MEDICATIONS  (PRN):  albuterol/ipratropium for Nebulization 3 milliLiter(s) Nebulizer every 6 hours PRN Shortness of Breath  traMADol 50 milliGRAM(s) Oral every 6 hours PRN Mild Pain (1 - 3)    ASSESSMENT AND PLAN:  ·	SOB.  ·	Chronic hypoxic Respiratory failure.  ·	Chronic pain syndrome.  ·	Anemia.  ·	Hypernatremia.  ·	Hypokalemia.  ·	HTN.  ·	Gout.  ·	Fibromyalgia.  ·	DVT hx.  ·	PE, small in 12/22 and 01/23      SPO2 97% on nasal O2.  Continue Apixaban.  Pain control.  IV hydration.  PT evaluation.  Consider psych evaluation.    02/26/23:  SPO2 95% on nasal O2 which is close to base line.  Continue apixaban.    02/27/23:  Breathing is better, SPO2 97%. She has home O2.  Pulmonary status close to base line.  For Discharge to [facility].

## 2023-02-27 NOTE — DISCHARGE NOTE PROVIDER - CARE PROVIDER_API CALL
Racheal De La Cruz)  Medicine  [address], [address]  [place] [address]  Phone: [phone]  Fax: [phone]  Follow Up Time: 2 weeks    PCP,   Phone: (   )    -  Fax: (   )    -  Follow Up Time:

## 2023-02-27 NOTE — DISCHARGE NOTE PROVIDER - PROVIDER TOKENS
PROVIDER:[TOKEN:[[id number]],FOLLOWUP:[2 weeks]],FREE:[LAST:[PCP],PHONE:[(   )    -],FAX:[(   )    -]]

## 2023-02-27 NOTE — PROGRESS NOTE ADULT - REASON FOR ADMISSION
myalgias

## 2023-02-27 NOTE — PROGRESS NOTE ADULT - NUTRITIONAL ASSESSMENT
This patient has been assessed with a concern for Malnutrition and has been determined to have a diagnosis/diagnoses of Moderate protein-calorie malnutrition.    This patient is being managed with:   Diet Regular-  Low Sodium  Entered: Feb 25 2023  2:42PM    
This patient has been assessed with a concern for Malnutrition and has been determined to have a diagnosis/diagnoses of Moderate protein-calorie malnutrition.    This patient is being managed with:   Diet Regular-  Low Sodium  Entered: Feb 25 2023  2:42PM    Diet Regular-  Entered: Feb 19 2023 12:45PM    The following pending diet order is being considered for treatment of Moderate protein-calorie malnutrition:null
This patient has been assessed with a concern for Malnutrition and has been determined to have a diagnosis/diagnoses of Moderate protein-calorie malnutrition.    This patient is being managed with:   Diet Regular-  Low Sodium  Entered: Feb 25 2023  2:42PM    Diet Regular-  Entered: Feb 19 2023 12:45PM    The following pending diet order is being considered for treatment of Moderate protein-calorie malnutrition:null

## 2023-02-27 NOTE — DISCHARGE NOTE PROVIDER - NSDCMRMEDTOKEN_GEN_ALL_CORE_FT
allopurinol 100 mg oral tablet: 1 tab(s) orally 2 times a day  amLODIPine 10 mg oral tablet: 1 tab(s) orally once a day  apixaban 5 mg oral tablet: 1 tab(s) orally every 12 hours  aspirin 81 mg oral delayed release tablet: 1 tab(s) orally once a day  atorvastatin 40 mg oral tablet: 1 tab(s) orally once a day  bisoprolol-hydrochlorothiazide 5 mg-6.25 mg oral tablet: 1 tab(s) orally once a day  Calcium 600+D oral tablet: orally once a day  cholestyramine 4 g/4.8 g oral powder for reconstitution: 1 packet(s) orally once a day  cilostazol 50 mg oral tablet: 1 tab(s) orally 2 times a day  colchicine 0.6 mg oral tablet: 1 tab(s) orally 2 times a day x 5 days  gabapentin 300 mg oral capsule: 1 cap(s) orally 3 times a day  Lyrica 100 mg oral capsule: 1 cap(s) orally 2 times a day  pantoprazole 40 mg oral delayed release tablet: 1 tab(s) orally once a day (before a meal)  polyethylene glycol 3350 oral powder for reconstitution: 17 gram(s) orally once a day  traMADol 50 mg oral tablet: 1 tab(s) orally every 6 hours, As needed, Mild Pain (1 - 3)

## 2023-02-27 NOTE — DISCHARGE NOTE NURSING/CASE MANAGEMENT/SOCIAL WORK - PATIENT PORTAL LINK FT
You can access the FollowMyHealth Patient Portal offered by [facility] by registering at the following website: [email or web address]. By joining [employer]’s FollowMyHealth portal, you will also be able to view your health information using other applications (apps) compatible with our system. Concern for possible superimposed bacterial pneumonia versus aspiration pneumonia   CXR showing large left sided opacity   lactate 3.9 ->3.0 continue to trend q6h until normal   will check procalcitonin, inflammatory markers   unlikely able to produce sputum for culture   monitor BCx  c/w Zosyn q12, renally dosed Concern for possible superimposed bacterial pneumonia versus aspiration pneumonia   CXR showing large left sided opacity   lactate 3.9 ->3.0 continue to trend q6h until normal   will check procalcitonin, inflammatory markers   unlikely able to produce sputum for culture   s/p 1L NS in the ED, would hold off on additional IVF at this time given appears slightly hypervolemic   monitor BCx  c/w Zosyn q12, renally dosed

## 2023-02-27 NOTE — PROGRESS NOTE ADULT - SUBJECTIVE AND OBJECTIVE BOX
Patient is a 89y old  Female who presents with a chief complaint of myalgias (26 Feb 2023 19:48)      INTERVAL HPI/OVERNIGHT EVENTS: pt sitting in a chair, no acute events, states that is a bit tired, because just "took a walk"   Still on oxygen -2L however pt states she uses oxygen at home too.     MEDICATIONS  (STANDING):  amLODIPine   Tablet 10 milliGRAM(s) Oral daily  apixaban 5 milliGRAM(s) Oral every 12 hours  aspirin enteric coated 81 milliGRAM(s) Oral daily  atorvastatin 40 milliGRAM(s) Oral at bedtime  colchicine 0.6 milliGRAM(s) Oral daily  polyethylene glycol 3350 17 Gram(s) Oral daily    MEDICATIONS  (PRN):  albuterol/ipratropium for Nebulization 3 milliLiter(s) Nebulizer every 6 hours PRN Shortness of Breath  traMADol 50 milliGRAM(s) Oral every 6 hours PRN Mild Pain (1 - 3)      Allergies    No Known Allergies    Intolerances        REVIEW OF SYSTEMS:  CONSTITUTIONAL: No fever, weight loss, or fatigue  EYES: No eye pain, visual disturbances, or discharge  ENMT:  No difficulty hearing, tinnitus, vertigo; No sinus or throat pain  NECK: No pain or stiffness  BREASTS: No pain, masses, or nipple discharge  RESPIRATORY: No cough, wheezing, chills or hemoptysis; No shortness of breath  CARDIOVASCULAR: No chest pain, palpitations, dizziness, or leg swelling  GASTROINTESTINAL: No abdominal or epigastric pain. No nausea, vomiting, or hematemesis; No diarrhea or constipation. No melena or hematochezia.  GENITOURINARY: No dysuria, frequency, hematuria, or incontinence  NEUROLOGICAL: No headaches, memory loss, loss of strength, numbness, or tremors  SKIN: No itching, burning, rashes, or lesions   LYMPH NODES: No enlarged glands  ENDOCRINE: No heat or cold intolerance; No hair loss  MUSCULOSKELETAL: No joint pain or swelling; No muscle, back, or extremity pain  PSYCHIATRIC: No depression, anxiety, mood swings, or difficulty sleeping  HEME/LYMPH: No easy bruising, or bleeding gums  ALLERGY AND IMMUNOLOGIC: No hives or eczema    Vital Signs Last 24 Hrs  T(C): 36.1 (27 Feb 2023 11:52), Max: 36.9 (26 Feb 2023 23:31)  T(F): 97 (27 Feb 2023 11:52), Max: 98.4 (26 Feb 2023 23:31)  HR: 82 (27 Feb 2023 11:52) (58 - 82)  BP: 126/72 (27 Feb 2023 11:52) (112/65 - 126/72)  BP(mean): --  RR: 18 (27 Feb 2023 11:52) (17 - 18)  SpO2: 98% (27 Feb 2023 11:52) (95% - 98%)    Parameters below as of 27 Feb 2023 04:56  Patient On (Oxygen Delivery Method): nasal cannula        PHYSICAL EXAM:  GENERAL: NAD, well-groomed, well-developed  HEAD:  Atraumatic, Normocephalic  EYES: EOMI, PERRLA, conjunctiva and sclera clear  ENMT: No tonsillar erythema, exudates, or enlargement; Moist mucous membranes, Good dentition, No lesions  NECK: Supple, No JVD, Normal thyroid  NERVOUS SYSTEM:  Alert & Oriented X3, Good concentration; Motor Strength 5/5 B/L upper and lower extremities; DTRs 2+ intact and symmetric  CHEST/LUNG: Clear to percussion bilaterally; No rales, rhonchi, wheezing, or rubs  HEART: Regular rate and rhythm; No murmurs, rubs, or gallops  ABDOMEN: Soft, Nontender, Nondistended; Bowel sounds present  EXTREMITIES:  2+ Peripheral Pulses, No clubbing, cyanosis, or edema  LYMPH: No lymphadenopathy noted  SKIN: No rashes or lesions    LABS:

## 2023-02-27 NOTE — DISCHARGE NOTE PROVIDER - HOSPITAL COURSE
89F with PMHx of CVA (x3), HTN, fibromyalgia, and recent diagnosis of PE (01/2023) presenting for myalgias and admitted for PT evaluation.    #Myalgia - resolved and likely due to fibromyalgia. They occur when she exerts herself.  - RVP negative (unlikely viral)  - Tolerating statin  - Needs HARPAL for graded exercise program  - Fibromyalgia treatment as below    #Fibromyalgia  - Continue with Lyrica  - Tramadol PRN pain    #Prior CVA  - Continue with baby aspirin and high dose statin    #Prior PE  - Pulm consult appreciated  - Continue with Eliquis (bleeding risk minima)    #HTN  - Continue with Amlodipine

## 2023-02-27 NOTE — DISCHARGE NOTE NURSING/CASE MANAGEMENT/SOCIAL WORK - NSDCVIVACCINE_GEN_ALL_CORE_FT
Moderna COVID-19 Vaccine, Bivalent; 21-Sep-2022 17:07; Emma Gonzales (RN); [employer] [id number] (Exp. Date: 13-Oct-2022); IntraMuscular; Deltoid Left.; 0.5 milliLiter(s);   influenza, injectable, quadrivalent, preservative free; 12-Oct-2017 15:24; Keyanna Dallas (RN); [employer]; [id number]; IntraMuscular; Deltoid Left.; 0.5 milliLiter(s); VIS (VIS Published: 07-Aug-2015, VIS Presented: 12-Oct-2017);   Tdap; 25-Dec-2019 20:38; Candice Orta (RN); [employer]; [id number] (Exp. Date: 17-Sep-2021); IntraMuscular; Deltoid Left.; 0.5 milliLiter(s); VIS (VIS Published: 09-May-2013, VIS Presented: 25-Dec-2019);

## 2023-02-27 NOTE — DISCHARGE NOTE PROVIDER - DETAILS OF MALNUTRITION DIAGNOSIS/DIAGNOSES
This patient has been assessed with a concern for Malnutrition and was treated during this hospitalization for the following Nutrition diagnosis/diagnoses:     -  02/25/2023: Moderate protein-calorie malnutrition

## 2023-02-27 NOTE — DISCHARGE NOTE NURSING/CASE MANAGEMENT/SOCIAL WORK - NSDCPEFALRISK_GEN_ALL_CORE
For information on Fall & Injury Prevention, visit: [email or web address] OR  [email or web address] OR  https://www.cdc.gov/steadi/patient.html

## 2023-02-27 NOTE — PROGRESS NOTE ADULT - PROVIDER SPECIALTY LIST ADULT
Internal Medicine
Pulmonology
Pulmonology
Hospitalist
Hospitalist
Internal Medicine
Pulmonology
Hospitalist
Internal Medicine
Hospitalist
Hospitalist

## 2023-03-03 DIAGNOSIS — K59.00 CONSTIPATION, UNSPECIFIED: ICD-10-CM

## 2023-03-03 DIAGNOSIS — G89.4 CHRONIC PAIN SYNDROME: ICD-10-CM

## 2023-03-03 DIAGNOSIS — E44.0 MODERATE PROTEIN-CALORIE MALNUTRITION: ICD-10-CM

## 2023-03-03 DIAGNOSIS — Z79.52 LONG TERM (CURRENT) USE OF SYSTEMIC STEROIDS: ICD-10-CM

## 2023-03-03 DIAGNOSIS — J96.11 CHRONIC RESPIRATORY FAILURE WITH HYPOXIA: ICD-10-CM

## 2023-03-03 DIAGNOSIS — D64.9 ANEMIA, UNSPECIFIED: ICD-10-CM

## 2023-03-03 DIAGNOSIS — Z86.73 PERSONAL HISTORY OF TRANSIENT ISCHEMIC ATTACK (TIA), AND CEREBRAL INFARCTION WITHOUT RESIDUAL DEFICITS: ICD-10-CM

## 2023-03-03 DIAGNOSIS — E87.0 HYPEROSMOLALITY AND HYPERNATREMIA: ICD-10-CM

## 2023-03-03 DIAGNOSIS — Z20.822 CONTACT WITH AND (SUSPECTED) EXPOSURE TO COVID-19: ICD-10-CM

## 2023-03-03 DIAGNOSIS — M79.7 FIBROMYALGIA: ICD-10-CM

## 2023-03-03 DIAGNOSIS — I10 ESSENTIAL (PRIMARY) HYPERTENSION: ICD-10-CM

## 2023-03-03 DIAGNOSIS — Z79.01 LONG TERM (CURRENT) USE OF ANTICOAGULANTS: ICD-10-CM

## 2023-03-03 DIAGNOSIS — Z79.82 LONG TERM (CURRENT) USE OF ASPIRIN: ICD-10-CM

## 2023-03-03 DIAGNOSIS — M10.9 GOUT, UNSPECIFIED: ICD-10-CM

## 2023-03-03 DIAGNOSIS — Z86.711 PERSONAL HISTORY OF PULMONARY EMBOLISM: ICD-10-CM

## 2023-03-03 DIAGNOSIS — E87.6 HYPOKALEMIA: ICD-10-CM

## 2023-04-03 NOTE — DIETITIAN NUTRITION RISK NOTIFICATION - INADEQUATE ENERGY INTAKE
< 75% for > 7 days Azelaic Acid Counseling: Patient counseled that medicine may cause skin irritation and to avoid applying near the eyes.  In the event of skin irritation, the patient was advised to reduce the amount of the drug applied or use it less frequently.   The patient verbalized understanding of the proper use and possible adverse effects of azelaic acid.  All of the patient's questions and concerns were addressed.

## 2023-08-14 ENCOUNTER — INPATIENT (INPATIENT)
Facility: HOSPITAL | Age: 88
LOS: 3 days | Discharge: NURSING, REHAB OR LONG-TERM CARE | End: 2023-08-18
Attending: INTERNAL MEDICINE | Admitting: INTERNAL MEDICINE
Payer: MEDICARE

## 2023-08-14 VITALS
TEMPERATURE: 98 F | HEIGHT: 66 IN | SYSTOLIC BLOOD PRESSURE: 157 MMHG | DIASTOLIC BLOOD PRESSURE: 71 MMHG | WEIGHT: 179.9 LBS | OXYGEN SATURATION: 95 % | HEART RATE: 87 BPM | RESPIRATION RATE: 18 BRPM

## 2023-08-14 LAB
ALBUMIN SERPL ELPH-MCNC: 3.1 G/DL — LOW (ref 3.3–5)
ALP SERPL-CCNC: 77 U/L — SIGNIFICANT CHANGE UP (ref 40–120)
ALT FLD-CCNC: 15 U/L — SIGNIFICANT CHANGE UP (ref 12–78)
ANION GAP SERPL CALC-SCNC: 6 MMOL/L — SIGNIFICANT CHANGE UP (ref 5–17)
APTT BLD: 34.1 SEC — SIGNIFICANT CHANGE UP (ref 24.5–35.6)
AST SERPL-CCNC: 20 U/L — SIGNIFICANT CHANGE UP (ref 15–37)
BASOPHILS # BLD AUTO: 0.03 K/UL — SIGNIFICANT CHANGE UP (ref 0–0.2)
BASOPHILS NFR BLD AUTO: 0.5 % — SIGNIFICANT CHANGE UP (ref 0–2)
BILIRUB SERPL-MCNC: 0.4 MG/DL — SIGNIFICANT CHANGE UP (ref 0.2–1.2)
BUN SERPL-MCNC: 12 MG/DL — SIGNIFICANT CHANGE UP (ref 7–23)
CALCIUM SERPL-MCNC: 9 MG/DL — SIGNIFICANT CHANGE UP (ref 8.5–10.1)
CHLORIDE SERPL-SCNC: 109 MMOL/L — HIGH (ref 96–108)
CO2 SERPL-SCNC: 31 MMOL/L — SIGNIFICANT CHANGE UP (ref 22–31)
CREAT SERPL-MCNC: 0.9 MG/DL — SIGNIFICANT CHANGE UP (ref 0.5–1.3)
EGFR: 60 ML/MIN/1.73M2 — SIGNIFICANT CHANGE UP
EOSINOPHIL # BLD AUTO: 0.16 K/UL — SIGNIFICANT CHANGE UP (ref 0–0.5)
EOSINOPHIL NFR BLD AUTO: 2.5 % — SIGNIFICANT CHANGE UP (ref 0–6)
FLUAV AG NPH QL: SIGNIFICANT CHANGE UP
FLUBV AG NPH QL: SIGNIFICANT CHANGE UP
GLUCOSE SERPL-MCNC: 71 MG/DL — SIGNIFICANT CHANGE UP (ref 70–99)
HCT VFR BLD CALC: 33.1 % — LOW (ref 34.5–45)
HGB BLD-MCNC: 10.3 G/DL — LOW (ref 11.5–15.5)
IMM GRANULOCYTES NFR BLD AUTO: 0.3 % — SIGNIFICANT CHANGE UP (ref 0–0.9)
INR BLD: 1.23 RATIO — HIGH (ref 0.85–1.18)
LYMPHOCYTES # BLD AUTO: 2.32 K/UL — SIGNIFICANT CHANGE UP (ref 1–3.3)
LYMPHOCYTES # BLD AUTO: 36.7 % — SIGNIFICANT CHANGE UP (ref 13–44)
MAGNESIUM SERPL-MCNC: 2.1 MG/DL — SIGNIFICANT CHANGE UP (ref 1.6–2.6)
MCHC RBC-ENTMCNC: 25.5 PG — LOW (ref 27–34)
MCHC RBC-ENTMCNC: 31.1 G/DL — LOW (ref 32–36)
MCV RBC AUTO: 81.9 FL — SIGNIFICANT CHANGE UP (ref 80–100)
MONOCYTES # BLD AUTO: 0.73 K/UL — SIGNIFICANT CHANGE UP (ref 0–0.9)
MONOCYTES NFR BLD AUTO: 11.5 % — SIGNIFICANT CHANGE UP (ref 2–14)
NEUTROPHILS # BLD AUTO: 3.07 K/UL — SIGNIFICANT CHANGE UP (ref 1.8–7.4)
NEUTROPHILS NFR BLD AUTO: 48.5 % — SIGNIFICANT CHANGE UP (ref 43–77)
NRBC # BLD: 0 /100 WBCS — SIGNIFICANT CHANGE UP (ref 0–0)
NT-PROBNP SERPL-SCNC: 315 PG/ML — SIGNIFICANT CHANGE UP (ref 0–450)
PLATELET # BLD AUTO: 312 K/UL — SIGNIFICANT CHANGE UP (ref 150–400)
POTASSIUM SERPL-MCNC: 3.6 MMOL/L — SIGNIFICANT CHANGE UP (ref 3.5–5.3)
POTASSIUM SERPL-SCNC: 3.6 MMOL/L — SIGNIFICANT CHANGE UP (ref 3.5–5.3)
PROT SERPL-MCNC: 7.6 GM/DL — SIGNIFICANT CHANGE UP (ref 6–8.3)
PROTHROM AB SERPL-ACNC: 14.5 SEC — HIGH (ref 9.5–13)
RBC # BLD: 4.04 M/UL — SIGNIFICANT CHANGE UP (ref 3.8–5.2)
RBC # FLD: 18.4 % — HIGH (ref 10.3–14.5)
SARS-COV-2 RNA SPEC QL NAA+PROBE: SIGNIFICANT CHANGE UP
SODIUM SERPL-SCNC: 146 MMOL/L — HIGH (ref 135–145)
TROPONIN I, HIGH SENSITIVITY RESULT: 19.9 NG/L — SIGNIFICANT CHANGE UP
WBC # BLD: 6.33 K/UL — SIGNIFICANT CHANGE UP (ref 3.8–10.5)
WBC # FLD AUTO: 6.33 K/UL — SIGNIFICANT CHANGE UP (ref 3.8–10.5)

## 2023-08-14 PROCEDURE — 99282 EMERGENCY DEPT VISIT SF MDM: CPT

## 2023-08-14 PROCEDURE — 71045 X-RAY EXAM CHEST 1 VIEW: CPT | Mod: 26

## 2023-08-14 PROCEDURE — 93970 EXTREMITY STUDY: CPT | Mod: 26

## 2023-08-14 PROCEDURE — 99285 EMERGENCY DEPT VISIT HI MDM: CPT

## 2023-08-14 PROCEDURE — 93010 ELECTROCARDIOGRAM REPORT: CPT | Mod: 76

## 2023-08-14 RX ORDER — GABAPENTIN 400 MG/1
300 CAPSULE ORAL THREE TIMES A DAY
Refills: 0 | Status: DISCONTINUED | OUTPATIENT
Start: 2023-08-14 | End: 2023-08-18

## 2023-08-14 RX ORDER — CHOLESTYRAMINE 4 G/9G
4 POWDER, FOR SUSPENSION ORAL DAILY
Refills: 0 | Status: DISCONTINUED | OUTPATIENT
Start: 2023-08-14 | End: 2023-08-18

## 2023-08-14 RX ORDER — ALLOPURINOL 300 MG
100 TABLET ORAL
Refills: 0 | Status: DISCONTINUED | OUTPATIENT
Start: 2023-08-14 | End: 2023-08-18

## 2023-08-14 RX ORDER — ASPIRIN/CALCIUM CARB/MAGNESIUM 324 MG
81 TABLET ORAL DAILY
Refills: 0 | Status: DISCONTINUED | OUTPATIENT
Start: 2023-08-14 | End: 2023-08-18

## 2023-08-14 RX ORDER — LANOLIN ALCOHOL/MO/W.PET/CERES
3 CREAM (GRAM) TOPICAL AT BEDTIME
Refills: 0 | Status: DISCONTINUED | OUTPATIENT
Start: 2023-08-14 | End: 2023-08-18

## 2023-08-14 RX ORDER — ACETAMINOPHEN 500 MG
650 TABLET ORAL EVERY 6 HOURS
Refills: 0 | Status: DISCONTINUED | OUTPATIENT
Start: 2023-08-14 | End: 2023-08-18

## 2023-08-14 RX ORDER — AMLODIPINE BESYLATE 2.5 MG/1
10 TABLET ORAL DAILY
Refills: 0 | Status: DISCONTINUED | OUTPATIENT
Start: 2023-08-14 | End: 2023-08-16

## 2023-08-14 RX ORDER — METOPROLOL TARTRATE 50 MG
100 TABLET ORAL DAILY
Refills: 0 | Status: DISCONTINUED | OUTPATIENT
Start: 2023-08-14 | End: 2023-08-14

## 2023-08-14 RX ORDER — CILOSTAZOL 100 MG/1
50 TABLET ORAL
Refills: 0 | Status: DISCONTINUED | OUTPATIENT
Start: 2023-08-14 | End: 2023-08-18

## 2023-08-14 RX ORDER — ATORVASTATIN CALCIUM 80 MG/1
40 TABLET, FILM COATED ORAL AT BEDTIME
Refills: 0 | Status: DISCONTINUED | OUTPATIENT
Start: 2023-08-14 | End: 2023-08-18

## 2023-08-14 RX ORDER — APIXABAN 2.5 MG/1
5 TABLET, FILM COATED ORAL EVERY 12 HOURS
Refills: 0 | Status: DISCONTINUED | OUTPATIENT
Start: 2023-08-14 | End: 2023-08-18

## 2023-08-14 RX ORDER — PANTOPRAZOLE SODIUM 20 MG/1
40 TABLET, DELAYED RELEASE ORAL
Refills: 0 | Status: DISCONTINUED | OUTPATIENT
Start: 2023-08-14 | End: 2023-08-18

## 2023-08-14 RX ORDER — FUROSEMIDE 40 MG
40 TABLET ORAL DAILY
Refills: 0 | Status: DISCONTINUED | OUTPATIENT
Start: 2023-08-14 | End: 2023-08-15

## 2023-08-14 RX ADMIN — Medication 40 MILLIGRAM(S): at 21:50

## 2023-08-14 RX ADMIN — APIXABAN 5 MILLIGRAM(S): 2.5 TABLET, FILM COATED ORAL at 21:34

## 2023-08-14 RX ADMIN — Medication 650 MILLIGRAM(S): at 21:33

## 2023-08-14 RX ADMIN — GABAPENTIN 300 MILLIGRAM(S): 400 CAPSULE ORAL at 21:28

## 2023-08-14 RX ADMIN — Medication 650 MILLIGRAM(S): at 22:30

## 2023-08-14 RX ADMIN — ATORVASTATIN CALCIUM 40 MILLIGRAM(S): 80 TABLET, FILM COATED ORAL at 21:28

## 2023-08-14 NOTE — ED ADULT NURSE NOTE - NSFALLHARMRISKINTERV_ED_ALL_ED

## 2023-08-14 NOTE — ED ADULT TRIAGE NOTE - CHIEF COMPLAINT QUOTE
patient BIBA c/o of lower extremities swelling and difficulty breathing started today , denied chest pain at this time history of DVT

## 2023-08-14 NOTE — H&P ADULT - NSHPLABSRESULTS_GEN_ALL_CORE
T(C): 36.4 (08-14-23 @ 21:48), Max: 36.7 (08-14-23 @ 15:44)  HR: 65 (08-14-23 @ 21:48) (64 - 87)  BP: 138/69 (08-14-23 @ 21:48) (138/69 - 157/71)  RR: 16 (08-14-23 @ 21:48) (16 - 18)  SpO2: 94% (08-14-23 @ 21:48) (94% - 99%)                        10.3   6.33  )-----------( 312      ( 14 Aug 2023 17:10 )             33.1     08-14    146<H>  |  109<H>  |  12  ----------------------------<  71  3.6   |  31  |  0.90    Ca    9.0      14 Aug 2023 17:10  Mg     2.1     08-14    TPro  7.6  /  Alb  3.1<L>  /  TBili  0.4  /  DBili  x   /  AST  20  /  ALT  15  /  AlkPhos  77  08-14    LIVER FUNCTIONS - ( 14 Aug 2023 17:10 )  Alb: 3.1 g/dL / Pro: 7.6 gm/dL / ALK PHOS: 77 U/L / ALT: 15 U/L / AST: 20 U/L / GGT: x           PT/INR - ( 14 Aug 2023 17:10 )   PT: 14.5 sec;   INR: 1.23 ratio         PTT - ( 14 Aug 2023 17:10 )  PTT:34.1 sec  Urinalysis Basic - ( 14 Aug 2023 17:10 )    Color: x / Appearance: x / SG: x / pH: x  Gluc: 71 mg/dL / Ketone: x  / Bili: x / Urobili: x   Blood: x / Protein: x / Nitrite: x   Leuk Esterase: x / RBC: x / WBC x   Sq Epi: x / Non Sq Epi: x / Bacteria: x      EKG - Sinus w/ sinus arrhythmia, 1st deg AVB     < from: US Duplex Venous Lower Ext Complete, Bilateral (08.14.23 @ 17:35) >    IMPRESSION:  No evidence of deep venous thrombosis in either lower extremity.      < end of copied text >    CXR - NAPD    acetaminophen     Tablet .. 650 milliGRAM(s) Oral every 6 hours PRN  allopurinol 100 milliGRAM(s) Oral two times a day  amLODIPine   Tablet 10 milliGRAM(s) Oral daily  apixaban 5 milliGRAM(s) Oral every 12 hours  aspirin enteric coated 81 milliGRAM(s) Oral daily  atorvastatin 40 milliGRAM(s) Oral at bedtime  cholestyramine Powder (Sugar-Free) 4 Gram(s) Oral daily  cilostazol 50 milliGRAM(s) Oral two times a day  furosemide   Injectable 40 milliGRAM(s) IV Push daily  gabapentin 300 milliGRAM(s) Oral three times a day  hydrochlorothiazide 6.25 milliGRAM(s) Oral daily  melatonin 3 milliGRAM(s) Oral at bedtime PRN  metoprolol succinate  milliGRAM(s) Oral daily  pantoprazole    Tablet 40 milliGRAM(s) Oral before breakfast  pregabalin 100 milliGRAM(s) Oral two times a day

## 2023-08-14 NOTE — H&P ADULT - NSHPSOCIALHISTORY_GEN_ALL_CORE
Discharge Summary - [address] 32 y o  male MRN: [id number]  Unit/Bed#: [facility] 354-84 Encounter: [id number]     Admission Date:   Admission Orders     Ordered        06/22/18 1140  Admit Patient to [facility], Rehab, Skilled Nursing)  Once                   Discharge Date: 8-7-2018    Attending Psychiatrist: Devorah Dillon MD    Reason for Admission/HPI:  This is a discharge summary on patient  Patient is a 27-year-old male who lives in [address] [place] [address]  He has a history of substance abuse which is extensive  He was recently released from [facility] where he served time for drug-related offenses  In the past 2 weeks reports increased feelings that someone is out to harm him in some way  He has used K2, bath salts and crystal meth in the past   Most recently, he was using crystal meth  He was also smoking marijuana  During the session, he was laughing inappropriately and stated he was not eating or sleeping well  He was brought to the emergency room by a friend because of his behavior  He was hearing voices telling him to get rid of  people in my ceiling  Given the patient's history of substance abuse, and his current level psychosis on admission, it was necessary for him to come in the hospital for treatment  History of Present Illness   Patient is a 32 y o  male presents with Signs of acute psychosis  Patient was admitted to psychiatric unit on a voluntarily 201 commitment basis  Primary complaints include: hearing voices  Onset of symptoms was gradual starting a few days ago with rapidly worsening course since that time  Psychosocial Stressors: family, health, drug and alcohol and social     Hospital Course:   Hospital course has been extensive  During his hospital stay, the patient was trialed on multiple antipsychotic medications  We had trial Seroquel, Zyprexa, Risperdal, Haldol, Thorazine and finally perphenazine    Out of all these antipsychotics, the patient responded the best to perphenazine  It decreased his agitation, and help to calm the voices  Unfortunately, it did not get rid of the voices  He still is experiencing chronic auditory hallucinations but they are not of a command nature and they are not troubling to him  He was also placed on the mood stabilizer, Trileptal, because of his level of agitation at times and when he was agitated, he was trying to get out of the unit  Since being on the combination of Trileptal and perphenazine, he has been calmer, more cooperative, out of his room and attending groups at times  He was having difficulty with placement post discharge and finally now tells us that his brother will be able to pick about hopefully today after 3:30PM   On day of discharge, the patient is denying any suicidal homicidal ideation  He is still experiencing auditory hallucinations, but as was noted earlier in this discharge summary, these are chronic and persistent  Again they are not of a command nature and he will be ready to be discharged today  He is discharged on the following medications, Cogentin 1 mg b i d , Benadryl 25 mg b i d , perphenazine 12 mg at noon and 16 mg b i d , Trileptal 300 mg t i d   All medications were ordered for 1 month supply and sent to the patient's pharmacy today  Patient's labs were reviewed daily and all labs are well within normal limits  The patient will follow up with Dr Mihaela Mason, psychiatry,on 9-  Mental Status at time of Discharge:     Appearance:  casually dressed   Behavior:  normal   Speech:  soft   Mood:  euthymic   Affect:  constricted   Thought Process:  disorganized   Thought Content:  normal   Perceptual Disturbances:  Auditory hallucinations without commands   Risk Potential: none   Sensorium:  person, place and time/date   Cognition:  impaired due to illness   Consciousness:  alert and awake    Attention: attention span appeared shorter than expected for age   Insight:  limited Judgment: limited   Gait/Station: normal gait/station   Motor Activity: no abnormal movements       Discharge Diagnosis:   Psychosis NOS      Resolved Problems  Date Reviewed: 8/7/2018    None              Discharge Medications:  See after visit summary for reconciled discharge medications provided to patient and family  Discharge instructions/Information to patient and family:   See after visit summary for information provided to patient and family  Provisions for Follow-Up Care:  See after visit summary for information related to follow-up care and any pertinent home health orders  Discharge Statement   I spent 30 minutes discharging the patient  This time was spent on the day of discharge  I had direct contact with the patient on the day of discharge  Additional documentation is required if more than 30 minutes were spent on discharge  Lives at home

## 2023-08-14 NOTE — PROVIDER CONTACT NOTE (OTHER) - ASSESSMENT
Patient AAOx4. Denies chest pain/ discomfort. Reports occasional SOB (chronic). Tele rhythm noted to be irregular- frequent missed beats. Run of vtach- 10 beats, asymptomatic. Patient AAOx4. Denies chest pain/ discomfort. Reports occasional SOB (chronic). Tele rhythm noted to be irregular- frequent missed beats. Run of vtach- 10 beats, asymptomatic. /73, HR 59, SpO2 94% on room air, RR 18, temp 97.6.

## 2023-08-14 NOTE — H&P ADULT - HISTORY OF PRESENT ILLNESS
89 y/o F with PMHx of CVA (x3), HTN, fibromyalgia, DVT/PE         89 y/o F with PMHx of CVA (x3), HTN, fibromyalgia, DVT/PE, on 2L home oxygen prn, Gout, Diastolic CHF presents to the ED c/o LE edema         89 y/o obese F with PMHx of CVA (x3), HTN, fibromyalgia, DVT/PE, on 2L home oxygen prn, Gout, Diastolic CHF presents to the ED c/o LE edema. Pt reports LE in the last week, reports she called her Cardiologist who prescribed Lasix 3x a week; unsure what dosage. Pt reports the right leg improved however the left leg did not. Pt reports LLE tender to palpation denies erythema or trauma. Pt reports chronic intermittent SOB due to PE which she states is at baseline. Pt denies cp, dizziness or palpitations.

## 2023-08-14 NOTE — ED PROVIDER NOTE - CLINICAL SUMMARY MEDICAL DECISION MAKING FREE TEXT BOX
sob, chf? rule out acs, rule out pe  I read ekg as sinus rhythm rate 1st degree av block rate 65, qtc 430, narrow qrs, normal axis. sob, chf? rule out acs, rule out pe  I read ekg as sinus rhythm rate 1st degree av block rate 65, qtc 430, narrow qrs, normal axis.  labs reassuring. no sign of pe. no sign of acs at the moment. given age and hx, however, will place on cardiac observation.

## 2023-08-14 NOTE — H&P ADULT - ASSESSMENT
89 y/o obese F with PMHx of CVA (x3), HTN, fibromyalgia, DVT/PE, on 2L home oxygen prn, Gout, Diastolic CHF presents to the ED c/o LE edema. Pt placed on observation overnight for diuresis.    # LE edema  - pt w/ hx of diastolic HF however does not appear to be floridly overloaded  - c/w IV lasix  - LE doppler neg  - keep LE elevated    # HTN/HLD  - c/w Amlodipine, BB  - c/w statin    # DVT/PE  - c/w Eliquis    # Fibromyalgia   - c/w Lyrica    # DVT ppx - pt on Eliquis    89 y/o obese F with PMHx of CVA (x3), HTN, fibromyalgia, DVT/PE, on 2L home oxygen prn, Gout, Diastolic CHF presents to the ED c/o LE edema. Pt placed on observation overnight for diuresis.    # LE edema  - pt w/ hx of diastolic HF however does not appear to be floridly overloaded  - c/w IV lasix  - LE doppler neg  - keep LE elevated    # HTN/HLD  - c/w Amlodipine, BB  - c/w statin    # DVT/PE  - c/w Eliquis    # Fibromyalgia   - c/w Lyrica    # DVT ppx - pt on Eliquis    Addendum   - Informed by RN, pt w/ 10 beats of VT asymptomatic. Rpt EKG showed Mobitz II block. BP stable. Trop and mag ordered, BB d/emilee. cont tele monitoring

## 2023-08-14 NOTE — H&P ADULT - NSHPPHYSICALEXAM_GEN_ALL_CORE
PHYSICAL EXAM:    Vital Signs Last 24 Hrs  T(C): 36.4 (14 Aug 2023 21:48), Max: 36.7 (14 Aug 2023 15:44)  T(F): 97.6 (14 Aug 2023 21:48), Max: 98 (14 Aug 2023 15:44)  HR: 65 (14 Aug 2023 21:48) (64 - 87)  BP: 138/69 (14 Aug 2023 21:48) (138/69 - 157/71)  BP(mean): --  RR: 16 (14 Aug 2023 21:48) (16 - 18)  SpO2: 94% (14 Aug 2023 21:48) (94% - 99%)    Parameters below as of 14 Aug 2023 21:48  Patient On (Oxygen Delivery Method): room air        GENERAL: Pt lying in bed comfortably in NAD  HEENT:  Atraumatic, EOMI, PERRL, conjunctiva and sclera clear, MMM  NECK: Supple, No JVD  CHEST/LUNG: Clear to auscultation bilaterally  HEART: Regular rate w/ some arrythmia  ABDOMEN: Bowel sounds present; Soft, Nontender, Nondistended.  EXTREMITIES:  2+ Peripheral Pulses, brisk capillary refill. +ve LE edema L>>>R  NEUROLOGICAL:  Alert & Oriented X3, No focal deficits   MSK: no overt deformities  SKIN: warm, dry

## 2023-08-14 NOTE — ED PROVIDER NOTE - OBJECTIVE STATEMENT
89F hx htn, hld, prev dvt not on ac pw sob. pt was with  who noted that her left leg was swollen. pt called ems complaining of left  greater than right leg swelling and mild sob. no cp. no vomiting, fever, chills,

## 2023-08-14 NOTE — PATIENT PROFILE ADULT - NSTRANSFERBELONGINGSDISPO_GEN_A_NUR
Jeremiah Garland, is a 76 year old male who presents today accompanied by his wife for a Medicare Wellness Visit (see detailed note) as well as for  follow-up on his hypothyroidism. Patient has hypothyroidism felt to be perpetuated part by some of his chemotherapy regimen. His levothyroxine dose and been adjusted 3 months ago he is due for follow-up lab tests. Record shows that the Sezary been ordered by his oncologist, Dr. Koch, with his other routine tests will be done next week.  Patient's primary complaint is difficulty breathing through his nose, particularly on the left. This makes using his CPAP device very difficult. It has not responded to saline irrigations.  Patient has a history of restless leg syndrome for which she is maintained on Requip, the dose of which was increased at his last appointment with me.      Past Medical History:    Benign neoplasm of rectum and anal canal        05/16/05        Comment: on flex sig/sent for path    Unspecified hemorrhoids with other complication 05/16/05        Comment: 6/10/05 pathology: hemorrhoids    Personal history of colonic polyps              9/04/03         Comment: Dr. Subramanian, multiple polyps removed, 2 were                tubular adenoma(s)    Personal history of colonic polyps              1/10/03         Comment: Dr. Subramanian; multiple polyps removed, 4 -                adenomatous polyps and 2 - colonic mucosa wirh                focal hyperplastic change.    Senile cataract, unspecified                    5/3/2006      Personal history of colonic polyps              06/08/06        Comment: tubular adenoma, hyperplastic polyp, 3                fragments of hyperplastic polyp. recommended                f/u 5 years.     Other malignant neoplasm of skin, site unspeci* 9/2003          Comment: BCC nose    Essential hypertension, benign                                Other and unspecified hyperlipidemia                          Benign neoplasm of colon                                         Comment: Colon Polyp    Cataract cortical, senile                       9/30/2015     Tubular adenoma of colon x 4                    01/27/2017    Oxygen dependent                                07/2017         Comment: On home O2    Bronchitis                                                    Pneumonia                                                     COPD (chronic obstructive pulmonary disease) (*               Gastroesophageal reflux disease                               Arthritis                                                     [place], AHI 63 on CPAP 14 nasal mask               11/29/2017    Adenocarcinoma of left lung (CMS/HCC)                         Oxygen dependent                                                Comment: Uses home O2    Medications:  Current Outpatient Medications   Medication Sig Dispense Refill   • doxazosin (CARDURA) 8 MG tablet TAKE 1 TABLET NIGHTLY 90 tablet 0   • simvastatin (ZOCOR) 20 MG tablet TAKE 1 TABLET EVERY EVENING 90 tablet 3   • potassium chloride (KLOR-CON M) 20 MEQ chester ER tablet Take 1 tablet by mouth 2 times daily. 180 tablet 1   • rOPINIRole (REQUIP) 0.5 MG tablet Take 3 tablets (1.5 mg) nightly 270 tablet 1   • hydrALAZINE (APRESOLINE) 50 MG tablet Take 1 tablet by mouth 2 times daily. 180 tablet 1   • furosemide (LASIX) 40 MG tablet Take 2 tablets by mouth daily. 180 tablet 1   • finasteride (PROSCAR) 5 MG tablet Take 1 tablet by mouth daily. 90 tablet 1   • ferrous sulfate 325 (65 FE) MG tablet TAKE 1 TABLET THREE TIMES A DAY WITH MEALS 270 tablet 0   • levothyroxine (SYNTHROID, LEVOTHROID) 25 MCG tablet Take 1 tablet by mouth daily. Take with a 200 mcg tablet 90 tablet 3   • levothyroxine (SYNTHROID, LEVOTHROID) 200 MCG tablet Take 1 tablet by mouth daily. Take with a 25 mcg tablet 90 tablet 3   • clobetasol (TEMOVATE) 0.05 % ointment Apply twice daily for 1 week then daily for 1 week then weekends only 60 g 2   • mometasone (NASONEX)  50 MCG/ACT nasal spray Spray 2 sprays in each nostril daily. 1 Bottle 3   • ANORO ELLIPTA 62.5-25 MCG/INH inhaler USE 1 INHALATION DAILY 180 each 3   • EPINEPHrine (EPIPEN 2-LUKE) 0.3 MG/0.3ML auto-injector Inject 0.3 mLs into the muscle as needed for Anaphylaxis (Use in case of severe allergic reaction, Call 911 if used.). 2 each 1   • cetirizine (ZYRTEC ALLERGY) 10 MG tablet Take 1 tablet by mouth daily.     • albuterol (PROAIR HFA) 108 (90 Base) MCG/ACT inhaler Inhale 2 puffs into the lungs every 4 hours as needed for Shortness of Breath or Wheezing. 1 Inhaler 5     No current facility-administered medications for this visit.        Physical Exam:  General: No acute distress.  Vital Signs:   Visit Vitals  /86   Pulse 79   Ht 5' 7\" (1.702 m)   Wt 116.6 kg   SpO2 90%   BMI 40.25 kg/m²     HEENT: Neck supple without lymphadenopathy, thyromegaly or mass. Patient demonstrates significant reduction in air passageway through both nares. Tissue is thickened although I cannot identify a specific obstruction. Oropharynx reveals narrow oral aperture  Coronary: Normal rhythm. No ectopy.  Lungs: Diminished breath sounds throughout. No wheezes  Extremities: Trace edema.      IMPRESSION:  Severe COPD  Obstructive sleep apnea on CPAP  Difficulty breathing through her nose. Suspect some type of fixed obstruction.  Hypothyroidism  Lung cancer    PLAN:  Patient will have lab test drawn next week.  Referral to ENT  Refill medications.     with patient

## 2023-08-14 NOTE — ED ADULT NURSE NOTE - OBJECTIVE STATEMENT
Pt aaox4, bibems from home c/o sob and dyspnea on exertion  x1 day and bilateral calf swelling since this morning. Pt denies cp, dizziness, n/v/d, fever/chills, cough, congestion, headache, abdominal pain, back pain, urinary s/s, numbness/tingling, weakness. Pt placed on cardiac monitor and spo2 monitor. 12 lead ekg in progress. Respirations even and unlabored at this time.

## 2023-08-14 NOTE — PATIENT PROFILE ADULT - FUNCTIONAL ASSESSMENT - DAILY ACTIVITY 4.
Frenotomy completed under sterile technique. Infant tolerated procedure well. Minimal bleeding noted. Full extension of tongue noted. Improved suck/swallow following procedure.   2 = A lot of assistance

## 2023-08-15 LAB
ANION GAP SERPL CALC-SCNC: 6 MMOL/L — SIGNIFICANT CHANGE UP (ref 5–17)
BUN SERPL-MCNC: 11 MG/DL — SIGNIFICANT CHANGE UP (ref 7–23)
CALCIUM SERPL-MCNC: 9.2 MG/DL — SIGNIFICANT CHANGE UP (ref 8.5–10.1)
CHLORIDE SERPL-SCNC: 106 MMOL/L — SIGNIFICANT CHANGE UP (ref 96–108)
CO2 SERPL-SCNC: 30 MMOL/L — SIGNIFICANT CHANGE UP (ref 22–31)
CREAT SERPL-MCNC: 0.85 MG/DL — SIGNIFICANT CHANGE UP (ref 0.5–1.3)
EGFR: 65 ML/MIN/1.73M2 — SIGNIFICANT CHANGE UP
GLUCOSE SERPL-MCNC: 82 MG/DL — SIGNIFICANT CHANGE UP (ref 70–99)
HCT VFR BLD CALC: 34.6 % — SIGNIFICANT CHANGE UP (ref 34.5–45)
HGB BLD-MCNC: 10.8 G/DL — LOW (ref 11.5–15.5)
MAGNESIUM SERPL-MCNC: 2 MG/DL — SIGNIFICANT CHANGE UP (ref 1.6–2.6)
MCHC RBC-ENTMCNC: 25.2 PG — LOW (ref 27–34)
MCHC RBC-ENTMCNC: 31.2 G/DL — LOW (ref 32–36)
MCV RBC AUTO: 80.7 FL — SIGNIFICANT CHANGE UP (ref 80–100)
NRBC # BLD: 0 /100 WBCS — SIGNIFICANT CHANGE UP (ref 0–0)
PLATELET # BLD AUTO: 314 K/UL — SIGNIFICANT CHANGE UP (ref 150–400)
POTASSIUM SERPL-MCNC: 3.3 MMOL/L — LOW (ref 3.5–5.3)
POTASSIUM SERPL-SCNC: 3.3 MMOL/L — LOW (ref 3.5–5.3)
RBC # BLD: 4.29 M/UL — SIGNIFICANT CHANGE UP (ref 3.8–5.2)
RBC # FLD: 18.1 % — HIGH (ref 10.3–14.5)
SODIUM SERPL-SCNC: 142 MMOL/L — SIGNIFICANT CHANGE UP (ref 135–145)
TROPONIN I, HIGH SENSITIVITY RESULT: 22.3 NG/L — SIGNIFICANT CHANGE UP
WBC # BLD: 5.52 K/UL — SIGNIFICANT CHANGE UP (ref 3.8–10.5)
WBC # FLD AUTO: 5.52 K/UL — SIGNIFICANT CHANGE UP (ref 3.8–10.5)

## 2023-08-15 PROCEDURE — 99282 EMERGENCY DEPT VISIT SF MDM: CPT

## 2023-08-15 RX ORDER — NYSTATIN CREAM 100000 [USP'U]/G
1 CREAM TOPICAL
Refills: 0 | Status: DISCONTINUED | OUTPATIENT
Start: 2023-08-15 | End: 2023-08-18

## 2023-08-15 RX ORDER — FUROSEMIDE 40 MG
20 TABLET ORAL DAILY
Refills: 0 | Status: DISCONTINUED | OUTPATIENT
Start: 2023-08-15 | End: 2023-08-16

## 2023-08-15 RX ORDER — POTASSIUM CHLORIDE 20 MEQ
40 PACKET (EA) ORAL EVERY 4 HOURS
Refills: 0 | Status: COMPLETED | OUTPATIENT
Start: 2023-08-15 | End: 2023-08-15

## 2023-08-15 RX ADMIN — Medication 100 MILLIGRAM(S): at 06:03

## 2023-08-15 RX ADMIN — Medication 650 MILLIGRAM(S): at 12:16

## 2023-08-15 RX ADMIN — GABAPENTIN 300 MILLIGRAM(S): 400 CAPSULE ORAL at 15:03

## 2023-08-15 RX ADMIN — Medication 40 MILLIGRAM(S): at 06:04

## 2023-08-15 RX ADMIN — CILOSTAZOL 50 MILLIGRAM(S): 100 TABLET ORAL at 06:05

## 2023-08-15 RX ADMIN — Medication 40 MILLIEQUIVALENT(S): at 15:03

## 2023-08-15 RX ADMIN — Medication 81 MILLIGRAM(S): at 11:16

## 2023-08-15 RX ADMIN — AMLODIPINE BESYLATE 10 MILLIGRAM(S): 2.5 TABLET ORAL at 06:02

## 2023-08-15 RX ADMIN — APIXABAN 5 MILLIGRAM(S): 2.5 TABLET, FILM COATED ORAL at 06:03

## 2023-08-15 RX ADMIN — Medication 650 MILLIGRAM(S): at 11:16

## 2023-08-15 RX ADMIN — Medication 100 MILLIGRAM(S): at 19:00

## 2023-08-15 RX ADMIN — PANTOPRAZOLE SODIUM 40 MILLIGRAM(S): 20 TABLET, DELAYED RELEASE ORAL at 06:03

## 2023-08-15 RX ADMIN — CHOLESTYRAMINE 4 GRAM(S): 4 POWDER, FOR SUSPENSION ORAL at 09:58

## 2023-08-15 RX ADMIN — NYSTATIN CREAM 1 APPLICATION(S): 100000 CREAM TOPICAL at 19:00

## 2023-08-15 RX ADMIN — APIXABAN 5 MILLIGRAM(S): 2.5 TABLET, FILM COATED ORAL at 19:00

## 2023-08-15 RX ADMIN — GABAPENTIN 300 MILLIGRAM(S): 400 CAPSULE ORAL at 06:02

## 2023-08-15 RX ADMIN — Medication 100 MILLIGRAM(S): at 06:05

## 2023-08-15 RX ADMIN — Medication 40 MILLIEQUIVALENT(S): at 11:16

## 2023-08-15 RX ADMIN — CILOSTAZOL 50 MILLIGRAM(S): 100 TABLET ORAL at 20:24

## 2023-08-15 NOTE — PROGRESS NOTE ADULT - ASSESSMENT
89 y/o obese F with PMHx of CVA (x3), HTN, fibromyalgia, DVT/PE, on 2L home oxygen prn, Gout, Diastolic CHF presents to the ED c/o LE edema. Pt placed on observation overnight for diuresis.    # LE edema  - pt w/ hx of diastolic HF however does not appear to be floridly overloaded  - Given Lasix on arrival will switch to PO, as edema improved  - LE doppler neg  - keep LE elevated    # HTN/HLD  - c/w Amlodipine, BB  - c/w statin    # DVT/PE  - c/w Eliquis    # Fibromyalgia   - c/w Lyrica    # DVT ppx - pt on Eliquis    Addendum   - Informed by RN, pt w/ 10 beats of VT asymptomatic. Rpt EKG showed Mobitz II block. BP stable. Trop and mag ordered, BB d/emilee. cont tele monitoring     89 y/o obese F with PMHx of CVA (x3), HTN, fibromyalgia, DVT/PE, on 2L home oxygen prn, Gout, Diastolic CHF presents to the ED c/o LE edema. Pt placed on observation overnight for diuresis.    # LE edema  - pt w/ hx of diastolic HF however does not appear to be floridly overloaded  - Given Lasix on arrival will switch to PO 20 mg- with holding parameters  - LE doppler neg  - keep LE elevated  - if re-occurs consider switching anti-hypertensive CCB- as lower extremity edema is known side-effect    # HTN/HLD  - c/w Amlodipine, BB  - c/w statin    # DVT/PE  - c/w Eliquis    # Fibromyalgia   - c/w Lyrica    # DVT ppx - pt on Eliquis        89 y/o obese F with PMHx of CVA (x3), HTN, fibromyalgia, DVT/PE, on 2L home oxygen prn, Gout, Diastolic CHF presents to the ED c/o LE edema. Pt placed on observation overnight for diuresis.    # LE edema  - pt w/ hx of diastolic HF however does not appear to be floridly overloaded  - Given Lasix on arrival will switch to PO 20 mg- with holding parameters  - LE doppler neg  - keep LE elevated  - if re-occurs consider switching anti-hypertensive CCB- as lower extremity edema is known side-effect    # HTN/HLD  - c/w Amlodipine, BB  - c/w statin    # DVT/PE  - c/w Eliquis    # Fibromyalgia   - c/w Lyrica    # DVT ppx - pt on Eliquis    Dispo: PT adri

## 2023-08-15 NOTE — PHYSICAL THERAPY INITIAL EVALUATION ADULT - PERTINENT HX OF CURRENT PROBLEM, REHAB EVAL
Per H&P, Pt is a 89 y/o F with PMHx of CVA (x3), HTN, fibromyalgia, DVT/PE, on 2L home oxygen prn, Gout, Diastolic CHF presents to the ED c/o LE edema.

## 2023-08-15 NOTE — PHYSICAL THERAPY INITIAL EVALUATION ADULT - ADDITIONAL COMMENTS
Pt lives alone in private house with 3 steps to enter, 11-12 steps to second floor. ambulates with a rollator.

## 2023-08-15 NOTE — PHYSICAL THERAPY INITIAL EVALUATION ADULT - BALANCE TRAINING, PT EVAL
Pt will increase static/dynamic standing balance to good+ to perform all functional mobility without LOB, by 2 weeks

## 2023-08-16 LAB
ANION GAP SERPL CALC-SCNC: 4 MMOL/L — LOW (ref 5–17)
BASOPHILS # BLD AUTO: 0.03 K/UL — SIGNIFICANT CHANGE UP (ref 0–0.2)
BASOPHILS NFR BLD AUTO: 0.6 % — SIGNIFICANT CHANGE UP (ref 0–2)
BUN SERPL-MCNC: 19 MG/DL — SIGNIFICANT CHANGE UP (ref 7–23)
CALCIUM SERPL-MCNC: 9.1 MG/DL — SIGNIFICANT CHANGE UP (ref 8.5–10.1)
CHLORIDE SERPL-SCNC: 108 MMOL/L — SIGNIFICANT CHANGE UP (ref 96–108)
CO2 SERPL-SCNC: 29 MMOL/L — SIGNIFICANT CHANGE UP (ref 22–31)
CREAT SERPL-MCNC: 1.06 MG/DL — SIGNIFICANT CHANGE UP (ref 0.5–1.3)
EGFR: 50 ML/MIN/1.73M2 — LOW
EOSINOPHIL # BLD AUTO: 0.24 K/UL — SIGNIFICANT CHANGE UP (ref 0–0.5)
EOSINOPHIL NFR BLD AUTO: 4.4 % — SIGNIFICANT CHANGE UP (ref 0–6)
GLUCOSE SERPL-MCNC: 104 MG/DL — HIGH (ref 70–99)
HCT VFR BLD CALC: 34.8 % — SIGNIFICANT CHANGE UP (ref 34.5–45)
HGB BLD-MCNC: 10.8 G/DL — LOW (ref 11.5–15.5)
IMM GRANULOCYTES NFR BLD AUTO: 0.2 % — SIGNIFICANT CHANGE UP (ref 0–0.9)
LYMPHOCYTES # BLD AUTO: 2.01 K/UL — SIGNIFICANT CHANGE UP (ref 1–3.3)
LYMPHOCYTES # BLD AUTO: 37.2 % — SIGNIFICANT CHANGE UP (ref 13–44)
MCHC RBC-ENTMCNC: 25.4 PG — LOW (ref 27–34)
MCHC RBC-ENTMCNC: 31 G/DL — LOW (ref 32–36)
MCV RBC AUTO: 81.7 FL — SIGNIFICANT CHANGE UP (ref 80–100)
MONOCYTES # BLD AUTO: 0.76 K/UL — SIGNIFICANT CHANGE UP (ref 0–0.9)
MONOCYTES NFR BLD AUTO: 14 % — SIGNIFICANT CHANGE UP (ref 2–14)
NEUTROPHILS # BLD AUTO: 2.36 K/UL — SIGNIFICANT CHANGE UP (ref 1.8–7.4)
NEUTROPHILS NFR BLD AUTO: 43.6 % — SIGNIFICANT CHANGE UP (ref 43–77)
NRBC # BLD: 0 /100 WBCS — SIGNIFICANT CHANGE UP (ref 0–0)
NT-PROBNP SERPL-SCNC: 142 PG/ML — SIGNIFICANT CHANGE UP (ref 0–450)
PLATELET # BLD AUTO: 306 K/UL — SIGNIFICANT CHANGE UP (ref 150–400)
POTASSIUM SERPL-MCNC: 4.6 MMOL/L — SIGNIFICANT CHANGE UP (ref 3.5–5.3)
POTASSIUM SERPL-SCNC: 4.6 MMOL/L — SIGNIFICANT CHANGE UP (ref 3.5–5.3)
RBC # BLD: 4.26 M/UL — SIGNIFICANT CHANGE UP (ref 3.8–5.2)
RBC # FLD: 18.1 % — HIGH (ref 10.3–14.5)
SODIUM SERPL-SCNC: 141 MMOL/L — SIGNIFICANT CHANGE UP (ref 135–145)
WBC # BLD: 5.41 K/UL — SIGNIFICANT CHANGE UP (ref 3.8–10.5)
WBC # FLD AUTO: 5.41 K/UL — SIGNIFICANT CHANGE UP (ref 3.8–10.5)

## 2023-08-16 PROCEDURE — 99232 SBSQ HOSP IP/OBS MODERATE 35: CPT

## 2023-08-16 RX ORDER — SODIUM CHLORIDE 9 MG/ML
1000 INJECTION INTRAMUSCULAR; INTRAVENOUS; SUBCUTANEOUS
Refills: 0 | Status: DISCONTINUED | OUTPATIENT
Start: 2023-08-16 | End: 2023-08-16

## 2023-08-16 RX ORDER — SODIUM CHLORIDE 9 MG/ML
500 INJECTION INTRAMUSCULAR; INTRAVENOUS; SUBCUTANEOUS
Refills: 0 | Status: COMPLETED | OUTPATIENT
Start: 2023-08-16 | End: 2023-08-16

## 2023-08-16 RX ADMIN — Medication 650 MILLIGRAM(S): at 08:58

## 2023-08-16 RX ADMIN — Medication 81 MILLIGRAM(S): at 13:03

## 2023-08-16 RX ADMIN — CILOSTAZOL 50 MILLIGRAM(S): 100 TABLET ORAL at 18:58

## 2023-08-16 RX ADMIN — GABAPENTIN 300 MILLIGRAM(S): 400 CAPSULE ORAL at 13:03

## 2023-08-16 RX ADMIN — Medication 100 MILLIGRAM(S): at 06:42

## 2023-08-16 RX ADMIN — CILOSTAZOL 50 MILLIGRAM(S): 100 TABLET ORAL at 06:42

## 2023-08-16 RX ADMIN — NYSTATIN CREAM 1 APPLICATION(S): 100000 CREAM TOPICAL at 06:43

## 2023-08-16 RX ADMIN — NYSTATIN CREAM 1 APPLICATION(S): 100000 CREAM TOPICAL at 18:58

## 2023-08-16 RX ADMIN — APIXABAN 5 MILLIGRAM(S): 2.5 TABLET, FILM COATED ORAL at 18:58

## 2023-08-16 RX ADMIN — Medication 100 MILLIGRAM(S): at 18:58

## 2023-08-16 RX ADMIN — GABAPENTIN 300 MILLIGRAM(S): 400 CAPSULE ORAL at 06:42

## 2023-08-16 RX ADMIN — CHOLESTYRAMINE 4 GRAM(S): 4 POWDER, FOR SUSPENSION ORAL at 11:39

## 2023-08-16 RX ADMIN — ATORVASTATIN CALCIUM 40 MILLIGRAM(S): 80 TABLET, FILM COATED ORAL at 22:38

## 2023-08-16 RX ADMIN — PANTOPRAZOLE SODIUM 40 MILLIGRAM(S): 20 TABLET, DELAYED RELEASE ORAL at 06:43

## 2023-08-16 RX ADMIN — AMLODIPINE BESYLATE 10 MILLIGRAM(S): 2.5 TABLET ORAL at 06:42

## 2023-08-16 RX ADMIN — APIXABAN 5 MILLIGRAM(S): 2.5 TABLET, FILM COATED ORAL at 06:43

## 2023-08-16 RX ADMIN — Medication 20 MILLIGRAM(S): at 07:07

## 2023-08-16 RX ADMIN — SODIUM CHLORIDE 75 MILLILITER(S): 9 INJECTION INTRAMUSCULAR; INTRAVENOUS; SUBCUTANEOUS at 13:08

## 2023-08-16 RX ADMIN — Medication 650 MILLIGRAM(S): at 07:58

## 2023-08-16 RX ADMIN — GABAPENTIN 300 MILLIGRAM(S): 400 CAPSULE ORAL at 22:38

## 2023-08-16 NOTE — CHART NOTE - NSCHARTNOTEFT_GEN_A_CORE
Vital Signs Last 24 Hrs  T(C): 36.7 (16 Aug 2023 11:16), Max: 37.2 (15 Aug 2023 15:44)  T(F): 98 (16 Aug 2023 11:16), Max: 98.9 (15 Aug 2023 15:44)  HR: 81 (16 Aug 2023 11:38) (57 - 81)  BP: 92/52 (16 Aug 2023 11:38) (92/52 - 124/71)  BP(mean): --  RR: 17 (16 Aug 2023 11:16) (17 - 18)  SpO2: 96% (16 Aug 2023 11:16) (93% - 96%)    Parameters below as of 16 Aug 2023 11:16  Patient On (Oxygen Delivery Method): room air    Pt felt lightheaded and like passing out on standing up   -- SBP-- 's. HR 80's, yesterday SBP-- 130-140's, pt denies any pain/SOB, orthostatics neg   -- hold on further diuretics and consider holding BP meds   -- repeat VS In 4 hrs and continue monitoring

## 2023-08-16 NOTE — PROGRESS NOTE ADULT - ASSESSMENT
89 y/o obese F with PMHx of CVA (x3), HTN, fibromyalgia, DVT/PE, on 2L home oxygen prn, Gout, Diastolic CHF presents to the ED c/o LE edema. Pt placed on diuresis.    # Acute on Chronic Diastolic CHF, LE edema - now appears to be near euvolemic.  Hold Lasix due to hypotension.  OOB with PT - recommending HARPAL placement.  # Chronic Respiratory Failure with hypoxia - O2 as tolerated.    # HTN - BP low, hold Amlodipine  # Hyperlipidemia - diet modification.  Continue Statin.  # h/o DVT / PE - continue AC.   # Fibromyalgia - c/w Lyrica    # DVT ppx - pt on Eliquis  Anticipate HARPAL in am.

## 2023-08-16 NOTE — PROVIDER CONTACT NOTE (OTHER) - BACKGROUND
Hx HTN, CHF, CVA, Fibromyalgia
Adm on 08/14 w/ b/l LE edema. PMH includes: HTN, CVA, neuropathy, osteoarthritis, fibromyalgia and HTN.

## 2023-08-16 NOTE — PROVIDER CONTACT NOTE (OTHER) - SITUATION
Orthostatics completed (negative, refer to VS flow sheet). Patient near syncope endorses lightheadedness and cloudy/spotty vision.
89yo Female admitted for tele obs for bilateral LE edema.

## 2023-08-17 LAB
ANION GAP SERPL CALC-SCNC: 3 MMOL/L — LOW (ref 5–17)
BUN SERPL-MCNC: 18 MG/DL — SIGNIFICANT CHANGE UP (ref 7–23)
CALCIUM SERPL-MCNC: 8.9 MG/DL — SIGNIFICANT CHANGE UP (ref 8.5–10.1)
CHLORIDE SERPL-SCNC: 111 MMOL/L — HIGH (ref 96–108)
CO2 SERPL-SCNC: 27 MMOL/L — SIGNIFICANT CHANGE UP (ref 22–31)
CREAT SERPL-MCNC: 0.98 MG/DL — SIGNIFICANT CHANGE UP (ref 0.5–1.3)
EGFR: 54 ML/MIN/1.73M2 — LOW
GLUCOSE SERPL-MCNC: 88 MG/DL — SIGNIFICANT CHANGE UP (ref 70–99)
HCT VFR BLD CALC: 32.5 % — LOW (ref 34.5–45)
HGB BLD-MCNC: 10 G/DL — LOW (ref 11.5–15.5)
MAGNESIUM SERPL-MCNC: 2.3 MG/DL — SIGNIFICANT CHANGE UP (ref 1.6–2.6)
MCHC RBC-ENTMCNC: 25 PG — LOW (ref 27–34)
MCHC RBC-ENTMCNC: 30.8 G/DL — LOW (ref 32–36)
MCV RBC AUTO: 81.3 FL — SIGNIFICANT CHANGE UP (ref 80–100)
NRBC # BLD: 0 /100 WBCS — SIGNIFICANT CHANGE UP (ref 0–0)
PLATELET # BLD AUTO: 299 K/UL — SIGNIFICANT CHANGE UP (ref 150–400)
POTASSIUM SERPL-MCNC: 4.1 MMOL/L — SIGNIFICANT CHANGE UP (ref 3.5–5.3)
POTASSIUM SERPL-SCNC: 4.1 MMOL/L — SIGNIFICANT CHANGE UP (ref 3.5–5.3)
RBC # BLD: 4 M/UL — SIGNIFICANT CHANGE UP (ref 3.8–5.2)
RBC # FLD: 18.1 % — HIGH (ref 10.3–14.5)
SODIUM SERPL-SCNC: 141 MMOL/L — SIGNIFICANT CHANGE UP (ref 135–145)
WBC # BLD: 4.86 K/UL — SIGNIFICANT CHANGE UP (ref 3.8–10.5)
WBC # FLD AUTO: 4.86 K/UL — SIGNIFICANT CHANGE UP (ref 3.8–10.5)

## 2023-08-17 PROCEDURE — 93010 ELECTROCARDIOGRAM REPORT: CPT

## 2023-08-17 PROCEDURE — 99232 SBSQ HOSP IP/OBS MODERATE 35: CPT

## 2023-08-17 RX ORDER — POLYETHYLENE GLYCOL 3350 17 G/17G
17 POWDER, FOR SOLUTION ORAL ONCE
Refills: 0 | Status: COMPLETED | OUTPATIENT
Start: 2023-08-17 | End: 2023-08-17

## 2023-08-17 RX ADMIN — CILOSTAZOL 50 MILLIGRAM(S): 100 TABLET ORAL at 06:01

## 2023-08-17 RX ADMIN — Medication 650 MILLIGRAM(S): at 08:43

## 2023-08-17 RX ADMIN — Medication 100 MILLIGRAM(S): at 17:19

## 2023-08-17 RX ADMIN — Medication 81 MILLIGRAM(S): at 11:16

## 2023-08-17 RX ADMIN — APIXABAN 5 MILLIGRAM(S): 2.5 TABLET, FILM COATED ORAL at 17:41

## 2023-08-17 RX ADMIN — POLYETHYLENE GLYCOL 3350 17 GRAM(S): 17 POWDER, FOR SOLUTION ORAL at 17:18

## 2023-08-17 RX ADMIN — NYSTATIN CREAM 1 APPLICATION(S): 100000 CREAM TOPICAL at 18:14

## 2023-08-17 RX ADMIN — GABAPENTIN 300 MILLIGRAM(S): 400 CAPSULE ORAL at 22:11

## 2023-08-17 RX ADMIN — PANTOPRAZOLE SODIUM 40 MILLIGRAM(S): 20 TABLET, DELAYED RELEASE ORAL at 06:00

## 2023-08-17 RX ADMIN — Medication 100 MILLIGRAM(S): at 06:01

## 2023-08-17 RX ADMIN — GABAPENTIN 300 MILLIGRAM(S): 400 CAPSULE ORAL at 06:00

## 2023-08-17 RX ADMIN — APIXABAN 5 MILLIGRAM(S): 2.5 TABLET, FILM COATED ORAL at 06:01

## 2023-08-17 RX ADMIN — Medication 650 MILLIGRAM(S): at 12:23

## 2023-08-17 RX ADMIN — CILOSTAZOL 50 MILLIGRAM(S): 100 TABLET ORAL at 17:18

## 2023-08-17 RX ADMIN — GABAPENTIN 300 MILLIGRAM(S): 400 CAPSULE ORAL at 14:21

## 2023-08-17 RX ADMIN — Medication 3 MILLIGRAM(S): at 22:51

## 2023-08-17 RX ADMIN — Medication 650 MILLIGRAM(S): at 22:51

## 2023-08-17 RX ADMIN — Medication 100 MILLIGRAM(S): at 06:00

## 2023-08-17 RX ADMIN — NYSTATIN CREAM 1 APPLICATION(S): 100000 CREAM TOPICAL at 06:03

## 2023-08-17 RX ADMIN — ATORVASTATIN CALCIUM 40 MILLIGRAM(S): 80 TABLET, FILM COATED ORAL at 22:17

## 2023-08-17 RX ADMIN — Medication 650 MILLIGRAM(S): at 16:17

## 2023-08-17 RX ADMIN — CHOLESTYRAMINE 4 GRAM(S): 4 POWDER, FOR SUSPENSION ORAL at 08:34

## 2023-08-17 NOTE — PROGRESS NOTE ADULT - ASSESSMENT
89 y/o obese F with PMHx of CVA (x3), HTN, fibromyalgia, DVT/PE, on 2L home oxygen prn, Gout, Diastolic CHF presents to the ED c/o LE edema. Pt placed on diuresis.    # Acute on Chronic Diastolic CHF, LE edema - now appears to be near euvolemic.  Hold Lasix due to hypotension.  BP improved.  Restart Lasix tomorrow.  OOB with PT - recommending HARPAL placement.  # Chronic Respiratory Failure with hypoxia - O2 as tolerated.    # HTN - BP low, hold Amlodipine  # Hyperlipidemia - diet modification.  Continue Statin.  # h/o DVT / PE - continue AC.   # Fibromyalgia - c/w Lyrica    # DVT ppx - pt on Eliquis    Stable for HARPAL

## 2023-08-18 ENCOUNTER — TRANSCRIPTION ENCOUNTER (OUTPATIENT)
Age: 88
End: 2023-08-18

## 2023-08-18 VITALS
SYSTOLIC BLOOD PRESSURE: 119 MMHG | HEART RATE: 83 BPM | OXYGEN SATURATION: 95 % | DIASTOLIC BLOOD PRESSURE: 71 MMHG | RESPIRATION RATE: 18 BRPM | TEMPERATURE: 98 F

## 2023-08-18 PROCEDURE — 99232 SBSQ HOSP IP/OBS MODERATE 35: CPT

## 2023-08-18 RX ORDER — FUROSEMIDE 40 MG
1 TABLET ORAL
Qty: 0 | Refills: 0 | DISCHARGE

## 2023-08-18 RX ORDER — BISOPROLOL FUMARATE AND HYDROCHLOROTHIAZIDE 5; 6.25 MG/1; MG/1
1 TABLET ORAL
Qty: 0 | Refills: 0 | DISCHARGE

## 2023-08-18 RX ADMIN — APIXABAN 5 MILLIGRAM(S): 2.5 TABLET, FILM COATED ORAL at 16:29

## 2023-08-18 RX ADMIN — NYSTATIN CREAM 1 APPLICATION(S): 100000 CREAM TOPICAL at 16:30

## 2023-08-18 RX ADMIN — Medication 81 MILLIGRAM(S): at 12:41

## 2023-08-18 RX ADMIN — Medication 100 MILLIGRAM(S): at 05:41

## 2023-08-18 RX ADMIN — CILOSTAZOL 50 MILLIGRAM(S): 100 TABLET ORAL at 05:41

## 2023-08-18 RX ADMIN — NYSTATIN CREAM 1 APPLICATION(S): 100000 CREAM TOPICAL at 05:44

## 2023-08-18 RX ADMIN — APIXABAN 5 MILLIGRAM(S): 2.5 TABLET, FILM COATED ORAL at 05:41

## 2023-08-18 RX ADMIN — GABAPENTIN 300 MILLIGRAM(S): 400 CAPSULE ORAL at 05:41

## 2023-08-18 RX ADMIN — PANTOPRAZOLE SODIUM 40 MILLIGRAM(S): 20 TABLET, DELAYED RELEASE ORAL at 05:41

## 2023-08-18 RX ADMIN — CILOSTAZOL 50 MILLIGRAM(S): 100 TABLET ORAL at 16:29

## 2023-08-18 RX ADMIN — GABAPENTIN 300 MILLIGRAM(S): 400 CAPSULE ORAL at 12:42

## 2023-08-18 RX ADMIN — Medication 100 MILLIGRAM(S): at 16:29

## 2023-08-18 RX ADMIN — CHOLESTYRAMINE 4 GRAM(S): 4 POWDER, FOR SUSPENSION ORAL at 12:41

## 2023-08-18 NOTE — PROGRESS NOTE ADULT - SUBJECTIVE AND OBJECTIVE BOX
CC: Patient is a 89y old  Female who presents with a chief complaint of LE edema (14 Aug 2023 19:07)      Patient seen and examined at bedside, No acute overnight events.  ROS: Denies fever, nausea, vomiting, chest pain, SOB, abdominal pain, diarrhea and constipation    Vital Sign  Vital Signs Last 24 Hrs  T(C): 37.1 (15 Aug 2023 11:20), Max: 37.1 (15 Aug 2023 11:20)  T(F): 98.8 (15 Aug 2023 11:20), Max: 98.8 (15 Aug 2023 11:20)  HR: 63 (15 Aug 2023 11:20) (51 - 87)  BP: 132/68 (15 Aug 2023 11:20) (132/68 - 157/71)  BP(mean): --  RR: 18 (15 Aug 2023 11:20) (15 - 18)  SpO2: 94% (15 Aug 2023 11:20) (94% - 99%)    Parameters below as of 15 Aug 2023 11:20  Patient On (Oxygen Delivery Method): room air        Physical Exam:   Gen: NAD  HEENT: NCAT, EOMI, PERRLA, Pupils_  CVS: RRR, +S1/S2, no murmurs, rubs or gallops appreciated  Lungs: CTAB, no wheeze, rales, rhonchi  Abdomen: +BS, soft, ND, NT. no palpable flank tenderness or mass, no CVA tenderness  Ext: No cyanosis, edema or calf tenderness  Neuro: AAOx3, no focal deficits.    Labs:                        10.8   5.52  )-----------( 314      ( 15 Aug 2023 07:35 )             34.6   08-15    142  |  106  |  11  ----------------------------<  82  3.3<L>   |  30  |  0.85    Ca    9.2      15 Aug 2023 07:35  Mg     2.0     08-14    TPro  7.6  /  Alb  3.1<L>  /  TBili  0.4  /  DBili  x   /  AST  20  /  ALT  15  /  AlkPhos  77  08-14 08-14 @ 07:01  -  08-15 @ 07:00  --------------------------------------------------------  IN: 0 mL / OUT: 700 mL / NET: -700 mL    08-15 @ 07:01  -  08-15 @ 14:52  --------------------------------------------------------  IN: 1020 mL / OUT: 400 mL / NET: 620 mL        Radiology:  *Pull    Medications:  MEDICATIONS  (STANDING):  allopurinol 100 milliGRAM(s) Oral two times a day  amLODIPine   Tablet 10 milliGRAM(s) Oral daily  apixaban 5 milliGRAM(s) Oral every 12 hours  aspirin enteric coated 81 milliGRAM(s) Oral daily  atorvastatin 40 milliGRAM(s) Oral at bedtime  cholestyramine Powder (Sugar-Free) 4 Gram(s) Oral daily  cilostazol 50 milliGRAM(s) Oral two times a day  furosemide   Injectable 40 milliGRAM(s) IV Push daily  gabapentin 300 milliGRAM(s) Oral three times a day  nystatin Powder 1 Application(s) Topical two times a day  pantoprazole    Tablet 40 milliGRAM(s) Oral before breakfast  potassium chloride    Tablet ER 40 milliEquivalent(s) Oral every 4 hours  pregabalin 100 milliGRAM(s) Oral two times a day    MEDICATIONS  (PRN):  acetaminophen     Tablet .. 650 milliGRAM(s) Oral every 6 hours PRN Temp greater or equal to 38C (100.4F), Mild Pain (1 - 3)  melatonin 3 milliGRAM(s) Oral at bedtime PRN Insomnia  
                          Patient: LAURA CASTANEDA [id number] 89y Female                            Hospitalist Attending Note    Pt reports lightheadedness earlier, now better post IVF.  Now no complaints.  Taking po intake.     ____________________PHYSICAL EXAM:  GENERAL:  NAD Alert and Oriented x 3   HEENT: NCAT  CARDIOVASCULAR:  S1, S2  LUNGS: CTAB  ABDOMEN:  soft, (-) tenderness, (-) distension, (+) bowel sounds, (-) guarding, (-) rebound (-) rigidity  EXTREMITIES:  no cyanosis / clubbing / edema.   ____________________     VITALS:  Vital Signs Last 24 Hrs  T(C): 36.6 (16 Aug 2023 16:39), Max: 36.7 (16 Aug 2023 00:08)  T(F): 97.9 (16 Aug 2023 16:39), Max: 98 (16 Aug 2023 00:08)  HR: 69 (16 Aug 2023 16:39) (58 - 81)  BP: 110/66 (16 Aug 2023 16:39) (92/52 - 124/71)  BP(mean): --  RR: 18 (16 Aug 2023 16:39) (17 - 18)  SpO2: 95% (16 Aug 2023 16:39) (94% - 96%)    Parameters below as of 16 Aug 2023 16:39  Patient On (Oxygen Delivery Method): room air     Daily     Daily   CAPILLARY BLOOD GLUCOSE        I&O's Summary    15 Aug 2023 07:01  -  16 Aug 2023 07:00  --------------------------------------------------------  IN: 1020 mL / OUT: 1350 mL / NET: -330 mL        HISTORY:  PAST MEDICAL & SURGICAL HISTORY:  Hypertension      Fibromyalgia      Osteoarthritis      Neuropathy      HTN (hypertension)      CVA (cerebral vascular accident)  left side weakness      No significant past surgical history      Allergies    No Known Allergies    Intolerances       LABS:                        10.8   5.41  )-----------( 306      ( 16 Aug 2023 06:35 )             34.8     08-16    141  |  108  |  19  ----------------------------<  104<H>  4.6   |  29  |  1.06    Ca    9.1      16 Aug 2023 06:35  Mg     2.0     08-14    TPro  7.6  /  Alb  3.1<L>  /  TBili  0.4  /  DBili  x   /  AST  20  /  ALT  15  /  AlkPhos  77  08-14    PT/INR - ( 14 Aug 2023 17:10 )   PT: 14.5 sec;   INR: 1.23 ratio         PTT - ( 14 Aug 2023 17:10 )  PTT:34.1 sec  LIVER FUNCTIONS - ( 14 Aug 2023 17:10 )  Alb: 3.1 g/dL / Pro: 7.6 gm/dL / ALK PHOS: 77 U/L / ALT: 15 U/L / AST: 20 U/L / GGT: x           Urinalysis Basic - ( 16 Aug 2023 06:35 )    Color: x / Appearance: x / SG: x / pH: x  Gluc: 104 mg/dL / Ketone: x  / Bili: x / Urobili: x   Blood: x / Protein: x / Nitrite: x   Leuk Esterase: x / RBC: x / WBC x   Sq Epi: x / Non Sq Epi: x / Bacteria: x              MEDICATIONS:  MEDICATIONS  (STANDING):  allopurinol 100 milliGRAM(s) Oral two times a day  apixaban 5 milliGRAM(s) Oral every 12 hours  aspirin enteric coated 81 milliGRAM(s) Oral daily  atorvastatin 40 milliGRAM(s) Oral at bedtime  cholestyramine Powder (Sugar-Free) 4 Gram(s) Oral daily  cilostazol 50 milliGRAM(s) Oral two times a day  gabapentin 300 milliGRAM(s) Oral three times a day  nystatin Powder 1 Application(s) Topical two times a day  pantoprazole    Tablet 40 milliGRAM(s) Oral before breakfast  pregabalin 100 milliGRAM(s) Oral two times a day    MEDICATIONS  (PRN):  acetaminophen     Tablet .. 650 milliGRAM(s) Oral every 6 hours PRN Temp greater or equal to 38C (100.4F), Mild Pain (1 - 3)  melatonin 3 milliGRAM(s) Oral at bedtime PRN Insomnia  
                          Patient: LAURA CASTANEDA [id number] 89y Female                            Hospitalist Attending Note    BP Stable.    Now no complaints.  Taking po intake.     ____________________PHYSICAL EXAM:  GENERAL:  NAD Alert and Oriented x 3   HEENT: NCAT  CARDIOVASCULAR:  S1, S2  LUNGS: CTAB  ABDOMEN:  soft, (-) tenderness, (-) distension, (+) bowel sounds, (-) guarding, (-) rebound (-) rigidity  EXTREMITIES:  no cyanosis / clubbing / edema.   ____________________      VITALS:  Vital Signs Last 24 Hrs  T(C): 36.8 (17 Aug 2023 17:35), Max: 36.8 (17 Aug 2023 17:35)  T(F): 98.2 (17 Aug 2023 17:35), Max: 98.2 (17 Aug 2023 17:35)  HR: 72 (17 Aug 2023 17:35) (57 - 75)  BP: 128/72 (17 Aug 2023 17:35) (107/66 - 129/76)  BP(mean): --  RR: 18 (17 Aug 2023 17:35) (17 - 18)  SpO2: 94% (17 Aug 2023 17:35) (93% - 99%)    Parameters below as of 17 Aug 2023 17:35  Patient On (Oxygen Delivery Method): room air     Daily     Daily   CAPILLARY BLOOD GLUCOSE        I&O's Summary    16 Aug 2023 07:01  -  17 Aug 2023 07:00  --------------------------------------------------------  IN: 0 mL / OUT: 1200 mL / NET: -1200 mL    17 Aug 2023 07:01  -  17 Aug 2023 18:18  --------------------------------------------------------  IN: 900 mL / OUT: 0 mL / NET: 900 mL        LABS:                        10.0   4.86  )-----------( 299      ( 17 Aug 2023 08:30 )             32.5     08-17    141  |  111<H>  |  18  ----------------------------<  88  4.1   |  27  |  0.98    Ca    8.9      17 Aug 2023 08:30  Mg     2.3     08-17          Urinalysis Basic - ( 17 Aug 2023 08:30 )    Color: x / Appearance: x / SG: x / pH: x  Gluc: 88 mg/dL / Ketone: x  / Bili: x / Urobili: x   Blood: x / Protein: x / Nitrite: x   Leuk Esterase: x / RBC: x / WBC x   Sq Epi: x / Non Sq Epi: x / Bacteria: x              MEDICATIONS:  acetaminophen     Tablet .. 650 milliGRAM(s) Oral every 6 hours PRN  allopurinol 100 milliGRAM(s) Oral two times a day  apixaban 5 milliGRAM(s) Oral every 12 hours  aspirin enteric coated 81 milliGRAM(s) Oral daily  atorvastatin 40 milliGRAM(s) Oral at bedtime  cholestyramine Powder (Sugar-Free) 4 Gram(s) Oral daily  cilostazol 50 milliGRAM(s) Oral two times a day  gabapentin 300 milliGRAM(s) Oral three times a day  melatonin 3 milliGRAM(s) Oral at bedtime PRN  nystatin Powder 1 Application(s) Topical two times a day  pantoprazole    Tablet 40 milliGRAM(s) Oral before breakfast  pregabalin 100 milliGRAM(s) Oral two times a day    
                          Patient: LAURA CASTANEDA [id number] 89y Female                            Hospitalist Attending Note    BP Stable.    No complaints - no dizziness / lightheadedness / chest pain / SOB / palpitations.   Taking po intake.     ____________________PHYSICAL EXAM:  GENERAL:  NAD Alert and Oriented x 3   HEENT: NCAT  CARDIOVASCULAR:  S1, S2  LUNGS: CTAB  ABDOMEN:  soft, (-) tenderness, (-) distension, (+) bowel sounds, (-) guarding, (-) rebound (-) rigidity  EXTREMITIES:  no cyanosis / clubbing / edema.   ____________________      VITALS:  Vital Signs Last 24 Hrs  T(C): 36.6 (18 Aug 2023 10:38), Max: 36.9 (17 Aug 2023 23:45)  T(F): 97.8 (18 Aug 2023 10:38), Max: 98.4 (17 Aug 2023 23:45)  HR: 84 (18 Aug 2023 14:23) (65 - 84)  BP: 112/63 (18 Aug 2023 10:38) (112/63 - 129/76)  BP(mean): --  RR: 18 (18 Aug 2023 10:38) (18 - 18)  SpO2: 96% (18 Aug 2023 14:23) (94% - 98%)    Parameters below as of 18 Aug 2023 10:38  Patient On (Oxygen Delivery Method): room air     Daily     Daily   CAPILLARY BLOOD GLUCOSE        I&O's Summary    17 Aug 2023 07:01  -  18 Aug 2023 07:00  --------------------------------------------------------  IN: 900 mL / OUT: 0 mL / NET: 900 mL    18 Aug 2023 07:01  -  18 Aug 2023 15:51  --------------------------------------------------------  IN: 0 mL / OUT: 700 mL / NET: -700 mL        LABS:                        10.0   4.86  )-----------( 299      ( 17 Aug 2023 08:30 )             32.5     08-17    141  |  111<H>  |  18  ----------------------------<  88  4.1   |  27  |  0.98    Ca    8.9      17 Aug 2023 08:30  Mg     2.3     08-17          Urinalysis Basic - ( 17 Aug 2023 08:30 )    Color: x / Appearance: x / SG: x / pH: x  Gluc: 88 mg/dL / Ketone: x  / Bili: x / Urobili: x   Blood: x / Protein: x / Nitrite: x   Leuk Esterase: x / RBC: x / WBC x   Sq Epi: x / Non Sq Epi: x / Bacteria: x              MEDICATIONS:  acetaminophen     Tablet .. 650 milliGRAM(s) Oral every 6 hours PRN  allopurinol 100 milliGRAM(s) Oral two times a day  apixaban 5 milliGRAM(s) Oral every 12 hours  aspirin enteric coated 81 milliGRAM(s) Oral daily  atorvastatin 40 milliGRAM(s) Oral at bedtime  cholestyramine Powder (Sugar-Free) 4 Gram(s) Oral daily  cilostazol 50 milliGRAM(s) Oral two times a day  gabapentin 300 milliGRAM(s) Oral three times a day  melatonin 3 milliGRAM(s) Oral at bedtime PRN  nystatin Powder 1 Application(s) Topical two times a day  pantoprazole    Tablet 40 milliGRAM(s) Oral before breakfast  pregabalin 100 milliGRAM(s) Oral two times a day

## 2023-08-18 NOTE — DISCHARGE NOTE NURSING/CASE MANAGEMENT/SOCIAL WORK - NSDCPEFALRISK_GEN_ALL_CORE
For information on Fall & Injury Prevention, visit: [email or web address] OR  [email or web address] OR  https://www.cdc.gov/steadi/patient.html

## 2023-08-18 NOTE — DISCHARGE NOTE NURSING/CASE MANAGEMENT/SOCIAL WORK - NSDCVIVACCINE_GEN_ALL_CORE_FT
Moderna COVID-19 Vaccine, Bivalent; 21-Sep-2022 17:07; Emma Gonzales (RN); [employer] [id number] (Exp. Date: 13-Oct-2022); IntraMuscular; Deltoid Left.; 0.5 milliLiter(s);   influenza, injectable, quadrivalent, preservative free; 12-Oct-2017 15:24; Keyanna Dallas (RN); [employer]; [id number]; IntraMuscular; Deltoid Left.; 0.5 milliLiter(s); VIS (VIS Published: 07-Aug-2015, VIS Presented: 12-Oct-2017);   Tdap; 25-Dec-2019 20:38; Candice Orta (RN); [employer]; [id number] (Exp. Date: 17-Sep-2021); IntraMuscular; Deltoid Left.; 0.5 milliLiter(s); VIS (VIS Published: 09-May-2013, VIS Presented: 25-Dec-2019);

## 2023-08-18 NOTE — DISCHARGE NOTE PROVIDER - NSDCCPCAREPLAN_GEN_ALL_CORE_FT
PRINCIPAL DISCHARGE DIAGNOSIS  Diagnosis: Acute on chronic diastolic congestive heart failure  Assessment and Plan of Treatment:       SECONDARY DISCHARGE DIAGNOSES  Diagnosis: Acute on chronic diastolic congestive heart failure  Assessment and Plan of Treatment:     Diagnosis: Mild HTN  Assessment and Plan of Treatment:     Diagnosis: HLD (hyperlipidemia)  Assessment and Plan of Treatment:     Diagnosis: DVT, lower extremity  Assessment and Plan of Treatment:     Diagnosis: Pulmonary embolism  Assessment and Plan of Treatment:

## 2023-08-18 NOTE — DISCHARGE NOTE PROVIDER - CARE PROVIDER_API CALL
Primary MD,   Phone: (   )    -  Fax: (   )    -  Follow Up Time:     Cardiology,   Phone: (   )    -  Fax: (   )    -  Follow Up Time:

## 2023-08-18 NOTE — PROGRESS NOTE ADULT - ASSESSMENT
89 y/o obese F with PMHx of CVA (x3), HTN, fibromyalgia, DVT/PE, on 2L home oxygen prn, Gout, Diastolic CHF presents to the ED c/o LE edema. Pt placed on diuresis.    # Acute on Chronic Diastolic CHF, LE edema - now appears to be near euvolemic.  Continue Lasix.  BP stable.  OOB with PT - recommending HARPAL placement.  # Acute Respiratory Failure with hypoxia - titrate O2 as tolerated, SaO2 97% on RA.   # HTN - BP low, hold Amlodipine  # Hyperlipidemia - diet modification.  Continue Statin.  # h/o DVT / PE - continue AC.   # Fibromyalgia - c/w Lyrica  # DVT ppx - pt on Eliquis    Stable for HARPAL

## 2023-08-18 NOTE — DISCHARGE NOTE PROVIDER - PROVIDER TOKENS
FREE:[LAST:[Primary MD],PHONE:[(   )    -],FAX:[(   )    -]],FREE:[LAST:[Cardiology],PHONE:[(   )    -],FAX:[(   )    -]]

## 2023-08-18 NOTE — DISCHARGE NOTE PROVIDER - NSDCFUADDINST_GEN_ALL_CORE_FT
It is important to see your primary physician as well as any specialty physicians within the next week to perform a comprehensive medical review.  Call their offices for an appointment as soon as you leave the hospital.  You will also need to see them for renewal of your medications.  If have any difficulty following with a physician, contact the [facility] [phone] or via [email or web address].   To obtain your results, you can access the [facility] Patient Portal at [email or web address].  Your medical issues appear to be stable at this time, but if your symptoms recur or worsen, contact your physicians and/or return to the hospital if necessary.  If you encounter any issues or questions with your medication, call your physicians before stopping the medication.  Do not drive.  Limit your diet to 2 grams of sodium daily.

## 2023-08-18 NOTE — DISCHARGE NOTE NURSING/CASE MANAGEMENT/SOCIAL WORK - PATIENT PORTAL LINK FT
You can access the FollowMyHealth Patient Portal offered by [facility] by registering at the following website: [email or web address]. By joining [employer]’s FollowMyHealth portal, you will also be able to view your health information using other applications (apps) compatible with our system.

## 2023-08-18 NOTE — DISCHARGE NOTE PROVIDER - HOSPITAL COURSE
89 y/o obese F with PMHx of CVA (x3), HTN, fibromyalgia, DVT/PE, on 2L home oxygen prn, Gout, Diastolic CHF presents to the ED c/o LE edema. Pt placed on diuresis.    # Acute on Chronic Diastolic CHF, LE edema - now appears to be near euvolemic.  Continue Lasix.  BP stable.  OOB with PT - recommending HARPAL placement.  # Acute Respiratory Failure with hypoxia - titrate O2 as tolerated, SaO2 97% on RA.   # HTN - BP low, hold Amlodipine  # Hyperlipidemia - diet modification.  Continue Statin.  # h/o DVT / PE - continue AC.   # Fibromyalgia - c/w Lyrica  # DVT ppx - pt on Eliquis    Stable for HARPAL   Disposition: Stable for discharge.  Outpatient followup discussed.  Total time spent on discharge is  35  minutes.

## 2023-08-18 NOTE — DISCHARGE NOTE PROVIDER - NSDCMRMEDTOKEN_GEN_ALL_CORE_FT
allopurinol 100 mg oral tablet: 1 tab(s) orally 2 times a day  apixaban 5 mg oral tablet: 1 tab(s) orally every 12 hours  aspirin 81 mg oral delayed release tablet: 1 tab(s) orally once a day  atorvastatin 40 mg oral tablet: 1 tab(s) orally once a day  Calcium 600+D oral tablet: orally once a day  cholestyramine 4 g/4.8 g oral powder for reconstitution: 1 packet(s) orally once a day  cilostazol 50 mg oral tablet: 1 tab(s) orally 2 times a day  gabapentin 300 mg oral capsule: 1 cap(s) orally 3 times a day  Lasix 20 mg oral tablet: 1 orally once a day  Lyrica 100 mg oral capsule: 1 cap(s) orally 2 times a day  pantoprazole 40 mg oral delayed release tablet: 1 tab(s) orally once a day (before a meal)  polyethylene glycol 3350 oral powder for reconstitution: 17 gram(s) orally once a day

## 2023-08-21 DIAGNOSIS — I47.20 VENTRICULAR TACHYCARDIA, UNSPECIFIED: ICD-10-CM

## 2023-08-21 DIAGNOSIS — Z86.718 PERSONAL HISTORY OF OTHER VENOUS THROMBOSIS AND EMBOLISM: ICD-10-CM

## 2023-08-21 DIAGNOSIS — M79.7 FIBROMYALGIA: ICD-10-CM

## 2023-08-21 DIAGNOSIS — E78.5 HYPERLIPIDEMIA, UNSPECIFIED: ICD-10-CM

## 2023-08-21 DIAGNOSIS — J96.11 CHRONIC RESPIRATORY FAILURE WITH HYPOXIA: ICD-10-CM

## 2023-08-21 DIAGNOSIS — I11.0 HYPERTENSIVE HEART DISEASE WITH HEART FAILURE: ICD-10-CM

## 2023-08-21 DIAGNOSIS — Z99.81 DEPENDENCE ON SUPPLEMENTAL OXYGEN: ICD-10-CM

## 2023-08-21 DIAGNOSIS — Z79.82 LONG TERM (CURRENT) USE OF ASPIRIN: ICD-10-CM

## 2023-08-21 DIAGNOSIS — R06.02 SHORTNESS OF BREATH: ICD-10-CM

## 2023-08-21 DIAGNOSIS — I50.33 ACUTE ON CHRONIC DIASTOLIC (CONGESTIVE) HEART FAILURE: ICD-10-CM

## 2023-08-21 DIAGNOSIS — Z86.711 PERSONAL HISTORY OF PULMONARY EMBOLISM: ICD-10-CM

## 2023-08-30 ENCOUNTER — OUTPATIENT (OUTPATIENT)
Dept: OUTPATIENT SERVICES | Facility: HOSPITAL | Age: 88
LOS: 1 days | Discharge: HOME | End: 2023-08-30
Payer: MEDICARE

## 2023-08-30 DIAGNOSIS — J69.0 PNEUMONITIS DUE TO INHALATION OF FOOD AND VOMIT: ICD-10-CM

## 2023-08-30 PROCEDURE — 71045 X-RAY EXAM CHEST 1 VIEW: CPT | Mod: 26

## 2023-09-11 ENCOUNTER — EMERGENCY (EMERGENCY)
Facility: HOSPITAL | Age: 88
LOS: 0 days | Discharge: HOME | End: 2023-09-11
Attending: STUDENT IN AN ORGANIZED HEALTH CARE EDUCATION/TRAINING PROGRAM
Payer: MEDICARE

## 2023-09-11 VITALS
OXYGEN SATURATION: 97 % | HEART RATE: 65 BPM | DIASTOLIC BLOOD PRESSURE: 81 MMHG | SYSTOLIC BLOOD PRESSURE: 162 MMHG | RESPIRATION RATE: 16 BRPM

## 2023-09-11 VITALS
WEIGHT: 181 LBS | SYSTOLIC BLOOD PRESSURE: 187 MMHG | HEIGHT: 66 IN | TEMPERATURE: 98 F | RESPIRATION RATE: 17 BRPM | OXYGEN SATURATION: 100 % | HEART RATE: 67 BPM | DIASTOLIC BLOOD PRESSURE: 93 MMHG

## 2023-09-11 DIAGNOSIS — R06.02 SHORTNESS OF BREATH: ICD-10-CM

## 2023-09-11 DIAGNOSIS — R09.81 NASAL CONGESTION: ICD-10-CM

## 2023-09-11 DIAGNOSIS — I44.0 ATRIOVENTRICULAR BLOCK, FIRST DEGREE: ICD-10-CM

## 2023-09-11 DIAGNOSIS — Z79.01 LONG TERM (CURRENT) USE OF ANTICOAGULANTS: ICD-10-CM

## 2023-09-11 DIAGNOSIS — I50.30 UNSPECIFIED DIASTOLIC (CONGESTIVE) HEART FAILURE: ICD-10-CM

## 2023-09-11 DIAGNOSIS — R94.31 ABNORMAL ELECTROCARDIOGRAM [ECG] [EKG]: ICD-10-CM

## 2023-09-11 DIAGNOSIS — Z86.718 PERSONAL HISTORY OF OTHER VENOUS THROMBOSIS AND EMBOLISM: ICD-10-CM

## 2023-09-11 DIAGNOSIS — Z86.73 PERSONAL HISTORY OF TRANSIENT ISCHEMIC ATTACK (TIA), AND CEREBRAL INFARCTION WITHOUT RESIDUAL DEFICITS: ICD-10-CM

## 2023-09-11 DIAGNOSIS — R07.2 PRECORDIAL PAIN: ICD-10-CM

## 2023-09-11 DIAGNOSIS — R11.10 VOMITING, UNSPECIFIED: ICD-10-CM

## 2023-09-11 DIAGNOSIS — Z87.39 PERSONAL HISTORY OF OTHER DISEASES OF THE MUSCULOSKELETAL SYSTEM AND CONNECTIVE TISSUE: ICD-10-CM

## 2023-09-11 DIAGNOSIS — Z86.711 PERSONAL HISTORY OF PULMONARY EMBOLISM: ICD-10-CM

## 2023-09-11 DIAGNOSIS — Z79.82 LONG TERM (CURRENT) USE OF ASPIRIN: ICD-10-CM

## 2023-09-11 LAB
ALBUMIN SERPL ELPH-MCNC: 3.2 G/DL — LOW (ref 3.3–5)
ALP SERPL-CCNC: 80 U/L — SIGNIFICANT CHANGE UP (ref 40–120)
ALT FLD-CCNC: 16 U/L — SIGNIFICANT CHANGE UP (ref 12–78)
ANION GAP SERPL CALC-SCNC: 6 MMOL/L — SIGNIFICANT CHANGE UP (ref 5–17)
APTT BLD: 31.6 SEC — SIGNIFICANT CHANGE UP (ref 24.5–35.6)
AST SERPL-CCNC: 18 U/L — SIGNIFICANT CHANGE UP (ref 15–37)
BASOPHILS # BLD AUTO: 0.03 K/UL — SIGNIFICANT CHANGE UP (ref 0–0.2)
BASOPHILS NFR BLD AUTO: 0.6 % — SIGNIFICANT CHANGE UP (ref 0–2)
BILIRUB SERPL-MCNC: 0.6 MG/DL — SIGNIFICANT CHANGE UP (ref 0.2–1.2)
BUN SERPL-MCNC: 11 MG/DL — SIGNIFICANT CHANGE UP (ref 7–23)
CALCIUM SERPL-MCNC: 8.8 MG/DL — SIGNIFICANT CHANGE UP (ref 8.5–10.1)
CHLORIDE SERPL-SCNC: 111 MMOL/L — HIGH (ref 96–108)
CO2 SERPL-SCNC: 24 MMOL/L — SIGNIFICANT CHANGE UP (ref 22–31)
CREAT SERPL-MCNC: 0.84 MG/DL — SIGNIFICANT CHANGE UP (ref 0.5–1.3)
EGFR: 66 ML/MIN/1.73M2 — SIGNIFICANT CHANGE UP
EOSINOPHIL # BLD AUTO: 0.04 K/UL — SIGNIFICANT CHANGE UP (ref 0–0.5)
EOSINOPHIL NFR BLD AUTO: 0.8 % — SIGNIFICANT CHANGE UP (ref 0–6)
GLUCOSE SERPL-MCNC: 70 MG/DL — SIGNIFICANT CHANGE UP (ref 70–99)
HCT VFR BLD CALC: 32 % — LOW (ref 34.5–45)
HGB BLD-MCNC: 10.2 G/DL — LOW (ref 11.5–15.5)
IMM GRANULOCYTES NFR BLD AUTO: 0.2 % — SIGNIFICANT CHANGE UP (ref 0–0.9)
INR BLD: 1.07 RATIO — SIGNIFICANT CHANGE UP (ref 0.85–1.18)
LIDOCAIN IGE QN: 25 U/L — SIGNIFICANT CHANGE UP (ref 13–75)
LYMPHOCYTES # BLD AUTO: 2.12 K/UL — SIGNIFICANT CHANGE UP (ref 1–3.3)
LYMPHOCYTES # BLD AUTO: 41.8 % — SIGNIFICANT CHANGE UP (ref 13–44)
MAGNESIUM SERPL-MCNC: 2.2 MG/DL — SIGNIFICANT CHANGE UP (ref 1.6–2.6)
MCHC RBC-ENTMCNC: 25.6 PG — LOW (ref 27–34)
MCHC RBC-ENTMCNC: 31.9 G/DL — LOW (ref 32–36)
MCV RBC AUTO: 80.2 FL — SIGNIFICANT CHANGE UP (ref 80–100)
MONOCYTES # BLD AUTO: 0.67 K/UL — SIGNIFICANT CHANGE UP (ref 0–0.9)
MONOCYTES NFR BLD AUTO: 13.2 % — SIGNIFICANT CHANGE UP (ref 2–14)
NEUTROPHILS # BLD AUTO: 2.2 K/UL — SIGNIFICANT CHANGE UP (ref 1.8–7.4)
NEUTROPHILS NFR BLD AUTO: 43.4 % — SIGNIFICANT CHANGE UP (ref 43–77)
NRBC # BLD: 0 /100 WBCS — SIGNIFICANT CHANGE UP (ref 0–0)
NT-PROBNP SERPL-SCNC: 923 PG/ML — HIGH (ref 0–450)
PLATELET # BLD AUTO: 388 K/UL — SIGNIFICANT CHANGE UP (ref 150–400)
POTASSIUM SERPL-MCNC: 3.5 MMOL/L — SIGNIFICANT CHANGE UP (ref 3.5–5.3)
POTASSIUM SERPL-SCNC: 3.5 MMOL/L — SIGNIFICANT CHANGE UP (ref 3.5–5.3)
PROT SERPL-MCNC: 8.1 GM/DL — SIGNIFICANT CHANGE UP (ref 6–8.3)
PROTHROM AB SERPL-ACNC: 12.8 SEC — SIGNIFICANT CHANGE UP (ref 9.5–13)
RBC # BLD: 3.99 M/UL — SIGNIFICANT CHANGE UP (ref 3.8–5.2)
RBC # FLD: 17 % — HIGH (ref 10.3–14.5)
SODIUM SERPL-SCNC: 141 MMOL/L — SIGNIFICANT CHANGE UP (ref 135–145)
TROPONIN I, HIGH SENSITIVITY RESULT: 33.2 NG/L — SIGNIFICANT CHANGE UP
TROPONIN I, HIGH SENSITIVITY RESULT: 36.6 NG/L — SIGNIFICANT CHANGE UP
WBC # BLD: 5.07 K/UL — SIGNIFICANT CHANGE UP (ref 3.8–10.5)
WBC # FLD AUTO: 5.07 K/UL — SIGNIFICANT CHANGE UP (ref 3.8–10.5)

## 2023-09-11 PROCEDURE — 99285 EMERGENCY DEPT VISIT HI MDM: CPT

## 2023-09-11 PROCEDURE — 71045 X-RAY EXAM CHEST 1 VIEW: CPT | Mod: 26

## 2023-09-11 PROCEDURE — 93010 ELECTROCARDIOGRAM REPORT: CPT

## 2023-09-11 RX ORDER — ASPIRIN/CALCIUM CARB/MAGNESIUM 324 MG
324 TABLET ORAL ONCE
Refills: 0 | Status: COMPLETED | OUTPATIENT
Start: 2023-09-11 | End: 2023-09-11

## 2023-09-11 RX ORDER — KETOROLAC TROMETHAMINE 30 MG/ML
15 SYRINGE (ML) INJECTION ONCE
Refills: 0 | Status: DISCONTINUED | OUTPATIENT
Start: 2023-09-11 | End: 2023-09-11

## 2023-09-11 RX ORDER — ACETAMINOPHEN 500 MG
1000 TABLET ORAL ONCE
Refills: 0 | Status: DISCONTINUED | OUTPATIENT
Start: 2023-09-11 | End: 2023-09-11

## 2023-09-11 RX ORDER — ACETAMINOPHEN 500 MG
650 TABLET ORAL ONCE
Refills: 0 | Status: COMPLETED | OUTPATIENT
Start: 2023-09-11 | End: 2023-09-11

## 2023-09-11 RX ADMIN — Medication 15 MILLIGRAM(S): at 18:00

## 2023-09-11 NOTE — ED PROVIDER NOTE - CLINICAL SUMMARY MEDICAL DECISION MAKING FREE TEXT BOX
+cp, resolved, reproducible on exam.   trop neg x 2.   ekg nonischemic.   Reviewed all results and necessity for follow up. Counseled on red flags and to return for them.  Patient appears well on discharge.

## 2023-09-11 NOTE — ED ADULT TRIAGE NOTE - TEMPERATURE IN CELSIUS (DEGREES C)
36.6 Bill For Individual Tests Below?: yes Venipuncture Paragraph: An alcohol pad was applied to the venipuncture site. Venipuncture was performed using a butterfly needle. Pressure and a bandaid was applied to the site. No complications were noted. Detail Level: None Number Of Tubes Drawn: 1

## 2023-09-11 NOTE — ED ADULT NURSE NOTE - OBJECTIVE STATEMENT
A&OX4. patient C/O constant 8/10  stabbing chest pain with SOB. patient states Nausea denies numbness/ tingling in b/l arms .

## 2023-09-11 NOTE — ED ADULT TRIAGE NOTE - CHIEF COMPLAINT QUOTE
pt a&O x4 from home c.o of chest pain with shortness of breath. discharged form hospital yesterday with same.

## 2023-09-11 NOTE — ED PROVIDER NOTE - PROVIDER TOKENS
FREE:[LAST:[your pmd in 1-3 days],PHONE:[(   )    -],FAX:[(   )    -]],FREE:[LAST:[your cardiologist in 1-3 days],PHONE:[(   )    -],FAX:[(   )    -]]

## 2023-09-11 NOTE — ED ADULT NURSE REASSESSMENT NOTE - NS ED NURSE REASSESS COMMENT FT1
unable to get blood at this time. patient refused patient states they used ultrasound . KELLY Noriega made aware . patient also refuses po meds KELLY noriega made aware .

## 2023-09-11 NOTE — ED PROVIDER NOTE - CARE PROVIDER_API CALL
your pmd in 1-3 days,   Phone: (   )    -  Fax: (   )    -  Follow Up Time:     your cardiologist in 1-3 days,   Phone: (   )    -  Fax: (   )    -  Follow Up Time:

## 2023-09-11 NOTE — ED PROVIDER NOTE - PHYSICAL EXAMINATION
PHYSICAL EXAM:    GENERAL: Alert, appears stated age, well appearing, non-toxic  SKIN: Warm, pink and dry.   HEAD: NC, AT  EYE: Normal lids/conjunctiva  ENT: Normal hearing, patent oropharynx   NECK: +supple. No meningismus, or JVD  Pulm: Bilateral BS, normal resp effort, no wheezes, stridor, or retractions   CV: RRR, no M/R/G, 2+and = radial pulses  Abd: soft, non-tender, non-distended  Mskel: no erythema, cyanosis, edema. no calf tenderness  Neuro: AAOx3, moving extremities

## 2023-09-11 NOTE — ED PROVIDER NOTE - PATIENT PORTAL LINK FT
You can access the FollowMyHealth Patient Portal offered by [facility] by registering at the following website: [email or web address]. By joining [employer]’s FollowMyHealth portal, you will also be able to view your health information using other applications (apps) compatible with our system.

## 2023-09-11 NOTE — ED PROVIDER NOTE - OBJECTIVE STATEMENT
89-year-old female with PMH fibromyalgia, DVT/PE on Eliquis, diastolic CHF, CVA x3, on 2 L/min NC at baseline since her diagnosis of PE presents with substernal chest pain x last night intermittently x last night and then constant since this am.   +congestion.   +vomiting x1 s/p taking asa in ambulance today.   +mild sob associated with it.   compliant with eliquis.   no radiation, exertional component, diaphoresis.   no fever, cough, rash.   PCP Ellen  cardio elaina

## 2023-11-02 NOTE — ED ADULT TRIAGE NOTE - HEART RATE (BEATS/MIN)
Patient's wife, Maris, called stating that the patient's pain has increased since he has been home. She stated that the patient takes 1 & 1/2- 2 tablets of Norco 5-325mg to help with the pain.     I called Maris back to let her know that I received her voice mail.     I let her know that I will send Kaden a message and called her back with a response.     She verbalized a clear understanding.    99

## 2024-05-31 NOTE — PHYSICAL THERAPY INITIAL EVALUATION ADULT - PHYSICAL ASSIST/NONPHYSICAL ASSIST: SUPINE/SIT, REHAB EVAL
"Foot & Ankle Surgery  May 31, 2024    CC: \"Right foot pain\"    I was asked to see Tereza Conroy regarding the chief complaint by: Self    HPI:  Pt is a 31 year old female who presents with above complaint.  1 year history of right foot pain near the first MPJ.  \"Mostly gone now\".  Recently had a flare, \"I've had pain in my big toe knuckle occasionally when  walking for a while, but this Saturday it suddenly was really terrible pain while walking my dog and I could no longer put weight on the joint.\"  Pain 7 out of 10 \"normally, 1-2 a month.  Since Saturday, whenever I put pressure on my big toe\".  \"Ice, Tylenol, Salonpas\" and symptoms are essentially resolved at this point.  This happens intermittently, occasionally with increased activities.  She is going to [facility] in the near future.  She states she has chronic problems with joints including bilateral hip surgery and upcoming knee surgery.    ROS:   Pos for CC.  The patient denies current nausea, vomiting, chills, fevers, belly pain, calf pain, chest pain or SOB.  Complete remainder of ROS is otherwise neg.    VITALS:    Vitals:    05/31/24 0737   BP: 112/75   Weight: 74.8 kg (165 lb)       PMH:  No past medical history on file.    SXHX:    Past Surgical History:   Procedure Laterality Date    HIP SURGERY Bilateral 2018    Labrum repair        MEDS:    Current Outpatient Medications   Medication Sig Dispense Refill    levonorgestrel-ethinyl estradiol (AVIANE) 0.1-20 MG-MCG tablet Take 1 tablet by mouth daily      levonorgestrel-ethinyl estradiol (AVIANE) 0.1-20 MG-MCG tablet Take 1 tablet by mouth daily 90 tablet 3     Current Facility-Administered Medications   Medication Dose Route Frequency Provider Last Rate Last Admin    lidocaine (PF) (XYLOCAINE) 1 % injection 4 mL  4 mL   Phil Burnette, DO   4 mL at 01/16/24 1520       ALL:   No Known Allergies    FMH:    Family History   Problem Relation Age of Onset    Testicular cancer Brother         Doing well. S/p " Chemo    Colon Cancer Maternal Grandmother        SocHx:    Social History     Socioeconomic History    Marital status:      Spouse name: Not on file    Number of children: Not on file    Years of education: Not on file    Highest education level: Not on file   Occupational History    Not on file   Tobacco Use    Smoking status: Never    Smokeless tobacco: Never   Substance and Sexual Activity    Alcohol use: Yes    Drug use: Never    Sexual activity: Not on file   Other Topics Concern    Not on file   Social History Narrative    Not on file     Social Determinants of Health     Financial Resource Strain: Not on file   Food Insecurity: Not on file   Transportation Needs: Not on file   Physical Activity: Not on file   Stress: Not on file   Social Connections: Unknown (2/19/2024)    Received from [employer] [employer]    Social Connections     Frequency of Communication with Friends and Family: Not on file   Interpersonal Safety: Not on file   Housing Stability: Not on file           EXAMINATION:  Gen:   No apparent distress  Neuro:   A&Ox3, no deficits  Psych:    Answering questions appropriately for age and situation with normal affect  Head:    NCAT  Eye:    Visual scanning without deficit  Ear:    Response to auditory stimuli wnl  Lung:    Non-labored breathing on RA noted  Abd:    NTND per patient report  Lymph:    Neg for pitting/non-pitting edema BLE  Vasc:    Pulses palpable, CFT minimally delayed  Neuro:    Light touch sensation intact to all sensory nerve distributions without paresthesias  Derm:    Neg for nodules, lesions or ulcerations  MSK:    There is no right forefoot pain.  Specifically, first MPJ passive range of motion is normal without pain or crepitus.  There is no sesamoid, extensor tendon or flexor tendon pain.  Calf:    Neg for redness, swelling or tenderness    Assessment:  31 year old female with intermittent history  of pain around the right first MPJ without acute pathology on examination today      Medical Decision Making/Plan:  Discussed etiologies, anatomy and options  1.  Intermittent history of pain around the right first MPJ without acute pathology on examination today  -I personally reviewed and interpreted the patient's lower extremity history pertinent to today's visit, including imaging/labs, in preparation for initiating a treatment program.  -Her examination is benign.  There is no evidence of arthritis in the great toe joint  -Advise making shoe gear selections based on comfort  -RICE/NSAID versus Tylenol as needed based on pain; continue using Salonpas.  She also has Voltaren  -She has concerns about her upcoming trip to [place] as she will be doing a fair amount of walking.  She was given a prescription for Mobic to use in case symptoms are severe  -If the above plan works adequately for her intermittent symptoms, no further follow-up or intervention is needed.  If not, consider x-ray, MRI    Follow up: As needed or sooner with acute issues      Patient's medical history was reviewed today      Gopal Bustillos DPM FACFAS FACFAOM  Podiatric Foot & Ankle Surgeon  [facility]  [phone]    Disclaimer: This note consists of symbols derived from keyboarding, dictation and/or voice recognition software. As a result, there may be errors in the script that have gone undetected. Please consider this when interpreting information found in this chart.         set-up required/verbal cues/1 person assist

## 2024-09-24 NOTE — ED ADULT TRIAGE NOTE - BP NONINVASIVE SYSTOLIC (MM HG)
[Time Spent: ___ minutes] : I have spent [unfilled] minutes of time on the encounter which excludes teaching and separately reported services. 147

## 2025-02-18 NOTE — PROGRESS NOTE ADULT - PROBLEM/PLAN-4
I recommend:     Physical therapy with a [facility] trained PT     Follow up in 6 weeks after PT     If symptoms worsen and you are interested in possible oral anti-inflammatory prescription, please reach out via the patient portal   DISPLAY PLAN FREE TEXT

## 2025-03-17 NOTE — PROGRESS NOTE ADULT - PROBLEM/PLAN-1
Per Dr. Boogie, order for Tempus placed in EPIC.  
DISPLAY PLAN FREE TEXT
